# Patient Record
Sex: FEMALE | Race: WHITE | Employment: UNEMPLOYED
[De-identification: names, ages, dates, MRNs, and addresses within clinical notes are randomized per-mention and may not be internally consistent; named-entity substitution may affect disease eponyms.]

---

## 2017-01-11 ENCOUNTER — OFFICE VISIT (OUTPATIENT)
Dept: ORTHOPEDIC SURGERY | Facility: CLINIC | Age: 62
End: 2017-01-11

## 2017-01-11 VITALS
WEIGHT: 161 LBS | SYSTOLIC BLOOD PRESSURE: 153 MMHG | BODY MASS INDEX: 30.4 KG/M2 | DIASTOLIC BLOOD PRESSURE: 89 MMHG | HEART RATE: 72 BPM | HEIGHT: 61 IN

## 2017-01-11 DIAGNOSIS — M19.012 ARTHRITIS OF LEFT SHOULDER REGION: Primary | ICD-10-CM

## 2017-01-11 PROCEDURE — 99213 OFFICE O/P EST LOW 20 MIN: CPT | Performed by: ORTHOPAEDIC SURGERY

## 2017-03-08 ENCOUNTER — OFFICE VISIT (OUTPATIENT)
Dept: ORTHOPEDIC SURGERY | Facility: CLINIC | Age: 62
End: 2017-03-08

## 2017-03-08 DIAGNOSIS — M25.512 CHRONIC LEFT SHOULDER PAIN: ICD-10-CM

## 2017-03-08 DIAGNOSIS — M19.012 ARTHRITIS OF LEFT SHOULDER REGION: Primary | ICD-10-CM

## 2017-03-08 DIAGNOSIS — G89.29 CHRONIC LEFT SHOULDER PAIN: ICD-10-CM

## 2017-03-08 PROCEDURE — G8419 CALC BMI OUT NRM PARAM NOF/U: HCPCS | Performed by: ORTHOPAEDIC SURGERY

## 2017-03-08 PROCEDURE — 3014F SCREEN MAMMO DOC REV: CPT | Performed by: ORTHOPAEDIC SURGERY

## 2017-03-08 PROCEDURE — G8427 DOCREV CUR MEDS BY ELIG CLIN: HCPCS | Performed by: ORTHOPAEDIC SURGERY

## 2017-03-08 PROCEDURE — 3017F COLORECTAL CA SCREEN DOC REV: CPT | Performed by: ORTHOPAEDIC SURGERY

## 2017-03-08 PROCEDURE — 99213 OFFICE O/P EST LOW 20 MIN: CPT | Performed by: ORTHOPAEDIC SURGERY

## 2017-03-08 PROCEDURE — 1036F TOBACCO NON-USER: CPT | Performed by: ORTHOPAEDIC SURGERY

## 2017-03-08 PROCEDURE — G8484 FLU IMMUNIZE NO ADMIN: HCPCS | Performed by: ORTHOPAEDIC SURGERY

## 2017-03-08 RX ORDER — MELOXICAM 15 MG/1
15 TABLET ORAL DAILY
Qty: 30 TABLET | Refills: 6 | Status: SHIPPED | OUTPATIENT
Start: 2017-03-08 | End: 2017-09-29 | Stop reason: SDUPTHER

## 2017-06-13 PROBLEM — M19.91 PRIMARY OSTEOARTHRITIS: Status: ACTIVE | Noted: 2017-06-13

## 2017-06-26 ENCOUNTER — HOSPITAL ENCOUNTER (OUTPATIENT)
Dept: WOMENS IMAGING | Age: 62
Discharge: HOME | End: 2017-06-26
Payer: COMMERCIAL

## 2017-06-26 DIAGNOSIS — Z13.9 SCREENING: ICD-10-CM

## 2017-06-26 PROBLEM — M85.80 OSTEOPENIA: Status: ACTIVE | Noted: 2017-06-26

## 2017-06-26 PROCEDURE — G0202 SCR MAMMO BI INCL CAD: HCPCS

## 2017-06-26 PROCEDURE — 77080 DXA BONE DENSITY AXIAL: CPT

## 2017-07-10 ENCOUNTER — HOSPITAL ENCOUNTER (OUTPATIENT)
Age: 62
Setting detail: SPECIMEN
Discharge: HOME | End: 2017-07-10
Payer: COMMERCIAL

## 2017-07-10 DIAGNOSIS — E55.9 VITAMIN D DEFICIENCY: ICD-10-CM

## 2017-07-10 DIAGNOSIS — D50.9 IRON DEFICIENCY ANEMIA, UNSPECIFIED IRON DEFICIENCY ANEMIA TYPE: ICD-10-CM

## 2017-07-10 DIAGNOSIS — B18.2 CHRONIC HEPATITIS C WITHOUT HEPATIC COMA (HCC): ICD-10-CM

## 2017-07-10 DIAGNOSIS — E78.00 PURE HYPERCHOLESTEROLEMIA: ICD-10-CM

## 2017-07-10 LAB
ALBUMIN SERPL-MCNC: 4 G/DL (ref 3.5–5.2)
ALBUMIN/GLOBULIN RATIO: 1.6 (ref 1–2.5)
ALP BLD-CCNC: 56 U/L (ref 35–104)
ALT SERPL-CCNC: 12 U/L (ref 5–33)
AMYLASE: 29 U/L (ref 28–100)
ANION GAP SERPL CALCULATED.3IONS-SCNC: 10 MMOL/L (ref 9–17)
AST SERPL-CCNC: 14 U/L
BILIRUB SERPL-MCNC: 0.57 MG/DL (ref 0.3–1.2)
BUN BLDV-MCNC: 8 MG/DL (ref 8–23)
BUN/CREAT BLD: NORMAL (ref 9–20)
CALCIUM SERPL-MCNC: 9.3 MG/DL (ref 8.6–10.4)
CHLORIDE BLD-SCNC: 101 MMOL/L (ref 98–107)
CHOLESTEROL/HDL RATIO: 2.2
CHOLESTEROL: 151 MG/DL
CO2: 29 MMOL/L (ref 20–31)
CREAT SERPL-MCNC: 0.64 MG/DL (ref 0.5–0.9)
GFR AFRICAN AMERICAN: >60 ML/MIN
GFR NON-AFRICAN AMERICAN: >60 ML/MIN
GFR SERPL CREATININE-BSD FRML MDRD: NORMAL ML/MIN/{1.73_M2}
GFR SERPL CREATININE-BSD FRML MDRD: NORMAL ML/MIN/{1.73_M2}
GLUCOSE BLD-MCNC: 95 MG/DL (ref 70–99)
HCT VFR BLD CALC: 43.9 % (ref 36–46)
HDLC SERPL-MCNC: 68 MG/DL
HEMOGLOBIN: 14.5 G/DL (ref 12–16)
IRON: 72 UG/DL (ref 37–145)
LDL CHOLESTEROL: 69 MG/DL (ref 0–130)
LIPASE: 21 U/L (ref 13–60)
MCH RBC QN AUTO: 30.9 PG (ref 26–34)
MCHC RBC AUTO-ENTMCNC: 33 G/DL (ref 31–37)
MCV RBC AUTO: 93.4 FL (ref 80–100)
PDW BLD-RTO: 14 % (ref 12.5–15.4)
PLATELET # BLD: 212 K/UL (ref 140–450)
PMV BLD AUTO: 8.6 FL (ref 6–12)
POTASSIUM SERPL-SCNC: 3.9 MMOL/L (ref 3.7–5.3)
RBC # BLD: 4.7 M/UL (ref 4–5.2)
SODIUM BLD-SCNC: 140 MMOL/L (ref 135–144)
TOTAL PROTEIN: 6.5 G/DL (ref 6.4–8.3)
TRIGL SERPL-MCNC: 69 MG/DL
VITAMIN D 25-HYDROXY: 27.6 NG/ML (ref 30–100)
VLDLC SERPL CALC-MCNC: NORMAL MG/DL (ref 1–30)
WBC # BLD: 9.4 K/UL (ref 3.5–11)

## 2017-07-12 ENCOUNTER — OFFICE VISIT (OUTPATIENT)
Dept: ORTHOPEDIC SURGERY | Age: 62
End: 2017-07-12
Payer: COMMERCIAL

## 2017-07-12 VITALS — SYSTOLIC BLOOD PRESSURE: 161 MMHG | HEART RATE: 81 BPM | DIASTOLIC BLOOD PRESSURE: 91 MMHG

## 2017-07-12 DIAGNOSIS — M19.012 ARTHRITIS OF LEFT SHOULDER REGION: Primary | ICD-10-CM

## 2017-07-12 DIAGNOSIS — M65.341 TRIGGER FINGER, RIGHT RING FINGER: ICD-10-CM

## 2017-07-12 PROCEDURE — G8427 DOCREV CUR MEDS BY ELIG CLIN: HCPCS | Performed by: ORTHOPAEDIC SURGERY

## 2017-07-12 PROCEDURE — G8417 CALC BMI ABV UP PARAM F/U: HCPCS | Performed by: ORTHOPAEDIC SURGERY

## 2017-07-12 PROCEDURE — 99212 OFFICE O/P EST SF 10 MIN: CPT | Performed by: ORTHOPAEDIC SURGERY

## 2017-07-12 PROCEDURE — 3014F SCREEN MAMMO DOC REV: CPT | Performed by: ORTHOPAEDIC SURGERY

## 2017-07-12 PROCEDURE — 1036F TOBACCO NON-USER: CPT | Performed by: ORTHOPAEDIC SURGERY

## 2017-07-12 PROCEDURE — 3017F COLORECTAL CA SCREEN DOC REV: CPT | Performed by: ORTHOPAEDIC SURGERY

## 2017-08-14 ENCOUNTER — HOSPITAL ENCOUNTER (OUTPATIENT)
Dept: GENERAL RADIOLOGY | Facility: CLINIC | Age: 62
Discharge: HOME | End: 2017-08-14
Payer: COMMERCIAL

## 2017-08-14 ENCOUNTER — HOSPITAL ENCOUNTER (OUTPATIENT)
Facility: CLINIC | Age: 62
Discharge: HOME | End: 2017-08-14
Payer: COMMERCIAL

## 2017-08-14 DIAGNOSIS — R05.3 CHRONIC COUGH: ICD-10-CM

## 2017-08-14 PROCEDURE — 71020 XR CHEST STANDARD TWO VW: CPT

## 2017-08-14 PROCEDURE — 70220 X-RAY EXAM OF SINUSES: CPT

## 2017-09-29 DIAGNOSIS — G89.29 CHRONIC LEFT SHOULDER PAIN: ICD-10-CM

## 2017-09-29 DIAGNOSIS — M25.512 CHRONIC LEFT SHOULDER PAIN: ICD-10-CM

## 2017-09-29 DIAGNOSIS — M19.012 ARTHRITIS OF LEFT SHOULDER REGION: ICD-10-CM

## 2017-09-29 RX ORDER — MELOXICAM 15 MG/1
15 TABLET ORAL DAILY
Qty: 30 TABLET | Refills: 6 | Status: SHIPPED | OUTPATIENT
Start: 2017-09-29 | End: 2018-05-14 | Stop reason: SDUPTHER

## 2017-10-18 ENCOUNTER — TELEPHONE (OUTPATIENT)
Dept: ORTHOPEDIC SURGERY | Age: 62
End: 2017-10-18

## 2017-10-18 DIAGNOSIS — M25.512 CHRONIC LEFT SHOULDER PAIN: Primary | ICD-10-CM

## 2017-10-18 DIAGNOSIS — G89.29 CHRONIC LEFT SHOULDER PAIN: Primary | ICD-10-CM

## 2017-10-26 ENCOUNTER — HOSPITAL ENCOUNTER (OUTPATIENT)
Dept: INTERVENTIONAL RADIOLOGY/VASCULAR | Age: 62
Discharge: HOME | End: 2017-10-26
Payer: COMMERCIAL

## 2017-10-26 DIAGNOSIS — M25.512 LEFT SHOULDER PAIN, UNSPECIFIED CHRONICITY: ICD-10-CM

## 2017-10-26 PROCEDURE — 2500000003 HC RX 250 WO HCPCS: Performed by: RADIOLOGY

## 2017-10-26 PROCEDURE — 23350 INJECTION FOR SHOULDER X-RAY: CPT

## 2017-10-26 PROCEDURE — 6360000004 HC RX CONTRAST MEDICATION: Performed by: ORTHOPAEDIC SURGERY

## 2017-10-26 PROCEDURE — 77002 NEEDLE LOCALIZATION BY XRAY: CPT

## 2017-10-26 PROCEDURE — 2500000003 HC RX 250 WO HCPCS: Performed by: ORTHOPAEDIC SURGERY

## 2017-10-26 PROCEDURE — 6360000002 HC RX W HCPCS: Performed by: ORTHOPAEDIC SURGERY

## 2017-10-26 RX ORDER — METHYLPREDNISOLONE ACETATE 80 MG/ML
80 INJECTION, SUSPENSION INTRA-ARTICULAR; INTRALESIONAL; INTRAMUSCULAR; SOFT TISSUE ONCE
Status: COMPLETED | OUTPATIENT
Start: 2017-10-26 | End: 2017-10-26

## 2017-10-26 RX ORDER — BUPIVACAINE HYDROCHLORIDE 5 MG/ML
4 INJECTION, SOLUTION EPIDURAL; INTRACAUDAL ONCE
Status: COMPLETED | OUTPATIENT
Start: 2017-10-26 | End: 2017-10-26

## 2017-10-26 RX ORDER — LIDOCAINE HYDROCHLORIDE 10 MG/ML
4 INJECTION, SOLUTION EPIDURAL; INFILTRATION; INTRACAUDAL; PERINEURAL ONCE
Status: COMPLETED | OUTPATIENT
Start: 2017-10-26 | End: 2017-10-26

## 2017-10-26 RX ORDER — LIDOCAINE HYDROCHLORIDE 20 MG/ML
INJECTION, SOLUTION EPIDURAL; INFILTRATION; INTRACAUDAL; PERINEURAL
Status: COMPLETED | OUTPATIENT
Start: 2017-10-26 | End: 2017-10-26

## 2017-10-26 RX ADMIN — IOTHALAMATE MEGLUMINE 50 ML: 600 INJECTION INTRAVASCULAR at 15:08

## 2017-10-26 RX ADMIN — BUPIVACAINE HYDROCHLORIDE 20 MG: 5 INJECTION, SOLUTION EPIDURAL; INTRACAUDAL at 15:00

## 2017-10-26 RX ADMIN — LIDOCAINE HYDROCHLORIDE 4 ML: 10 INJECTION, SOLUTION EPIDURAL; INFILTRATION; INTRACAUDAL; PERINEURAL at 15:00

## 2017-10-26 RX ADMIN — LIDOCAINE HYDROCHLORIDE 2 ML: 20 INJECTION, SOLUTION EPIDURAL; INFILTRATION; INTRACAUDAL; PERINEURAL at 14:45

## 2017-10-26 RX ADMIN — METHYLPREDNISOLONE ACETATE 80 MG: 80 INJECTION, SUSPENSION INTRA-ARTICULAR; INTRALESIONAL; INTRAMUSCULAR; SOFT TISSUE at 15:00

## 2017-12-30 DIAGNOSIS — N39.0 FREQUENT UTI: ICD-10-CM

## 2018-01-02 RX ORDER — NITROFURANTOIN 25; 75 MG/1; MG/1
CAPSULE ORAL
Qty: 30 CAPSULE | Refills: 5 | Status: SHIPPED | OUTPATIENT
Start: 2018-01-02 | End: 2018-04-04

## 2018-04-04 DIAGNOSIS — M25.512 CHRONIC LEFT SHOULDER PAIN: Primary | ICD-10-CM

## 2018-04-04 DIAGNOSIS — G89.29 CHRONIC LEFT SHOULDER PAIN: Primary | ICD-10-CM

## 2018-04-04 PROBLEM — L97.521 CHRONIC ULCER OF LEFT FOOT LIMITED TO BREAKDOWN OF SKIN (HCC): Status: ACTIVE | Noted: 2018-04-04

## 2018-04-09 ENCOUNTER — OFFICE VISIT (OUTPATIENT)
Dept: PODIATRY | Age: 63
End: 2018-04-09
Payer: COMMERCIAL

## 2018-04-09 VITALS
SYSTOLIC BLOOD PRESSURE: 125 MMHG | HEART RATE: 74 BPM | HEIGHT: 61 IN | BODY MASS INDEX: 30.21 KG/M2 | WEIGHT: 160 LBS | DIASTOLIC BLOOD PRESSURE: 77 MMHG

## 2018-04-09 DIAGNOSIS — L97.521 SKIN ULCER OF TOE OF LEFT FOOT, LIMITED TO BREAKDOWN OF SKIN (HCC): Primary | ICD-10-CM

## 2018-04-09 DIAGNOSIS — I77.6 SMALL VESSEL VASCULITIS (HCC): ICD-10-CM

## 2018-04-09 DIAGNOSIS — M79.675 PAIN OF TOE OF LEFT FOOT: ICD-10-CM

## 2018-04-09 DIAGNOSIS — I73.9 SMALL VESSEL DISEASE (HCC): ICD-10-CM

## 2018-04-09 PROCEDURE — 3017F COLORECTAL CA SCREEN DOC REV: CPT | Performed by: PODIATRIST

## 2018-04-09 PROCEDURE — 3014F SCREEN MAMMO DOC REV: CPT | Performed by: PODIATRIST

## 2018-04-09 PROCEDURE — 1036F TOBACCO NON-USER: CPT | Performed by: PODIATRIST

## 2018-04-09 PROCEDURE — 99203 OFFICE O/P NEW LOW 30 MIN: CPT | Performed by: PODIATRIST

## 2018-04-09 PROCEDURE — G8417 CALC BMI ABV UP PARAM F/U: HCPCS | Performed by: PODIATRIST

## 2018-04-09 PROCEDURE — G8427 DOCREV CUR MEDS BY ELIG CLIN: HCPCS | Performed by: PODIATRIST

## 2018-04-09 RX ORDER — LIDOCAINE HYDROCHLORIDE 10 MG/ML
4 INJECTION, SOLUTION EPIDURAL; INFILTRATION; INTRACAUDAL; PERINEURAL ONCE
Status: CANCELLED | OUTPATIENT
Start: 2018-04-09 | End: 2018-04-09

## 2018-04-09 RX ORDER — METHYLPREDNISOLONE ACETATE 80 MG/ML
80 INJECTION, SUSPENSION INTRA-ARTICULAR; INTRALESIONAL; INTRAMUSCULAR; SOFT TISSUE ONCE
Status: CANCELLED | OUTPATIENT
Start: 2018-04-09 | End: 2018-04-09

## 2018-04-09 RX ORDER — BUPIVACAINE HYDROCHLORIDE 5 MG/ML
4 INJECTION, SOLUTION EPIDURAL; INTRACAUDAL ONCE
Status: CANCELLED | OUTPATIENT
Start: 2018-04-09 | End: 2018-04-09

## 2018-04-09 ASSESSMENT — ENCOUNTER SYMPTOMS
NAUSEA: 0
BACK PAIN: 0
SHORTNESS OF BREATH: 0
DIARRHEA: 0
COLOR CHANGE: 0

## 2018-04-10 ENCOUNTER — HOSPITAL ENCOUNTER (OUTPATIENT)
Dept: INTERVENTIONAL RADIOLOGY/VASCULAR | Age: 63
Discharge: HOME | End: 2018-04-12
Payer: COMMERCIAL

## 2018-04-10 VITALS — WEIGHT: 160 LBS | BODY MASS INDEX: 30.21 KG/M2 | HEIGHT: 61 IN

## 2018-04-10 DIAGNOSIS — M25.512 PAIN IN JOINT OF LEFT SHOULDER: ICD-10-CM

## 2018-04-10 PROCEDURE — 20610 DRAIN/INJ JOINT/BURSA W/O US: CPT | Performed by: RADIOLOGY

## 2018-04-10 PROCEDURE — 6360000002 HC RX W HCPCS: Performed by: ORTHOPAEDIC SURGERY

## 2018-04-10 PROCEDURE — 2500000003 HC RX 250 WO HCPCS: Performed by: RADIOLOGY

## 2018-04-10 PROCEDURE — 2500000003 HC RX 250 WO HCPCS: Performed by: ORTHOPAEDIC SURGERY

## 2018-04-10 PROCEDURE — 6360000004 HC RX CONTRAST MEDICATION: Performed by: ORTHOPAEDIC SURGERY

## 2018-04-10 PROCEDURE — 77002 NEEDLE LOCALIZATION BY XRAY: CPT | Performed by: RADIOLOGY

## 2018-04-10 RX ORDER — BUPIVACAINE HYDROCHLORIDE 5 MG/ML
4 INJECTION, SOLUTION EPIDURAL; INTRACAUDAL ONCE
Status: COMPLETED | OUTPATIENT
Start: 2018-04-10 | End: 2018-04-10

## 2018-04-10 RX ORDER — LIDOCAINE HYDROCHLORIDE 20 MG/ML
INJECTION, SOLUTION INFILTRATION; PERINEURAL
Status: COMPLETED | OUTPATIENT
Start: 2018-04-10 | End: 2018-04-10

## 2018-04-10 RX ORDER — METHYLPREDNISOLONE ACETATE 80 MG/ML
80 INJECTION, SUSPENSION INTRA-ARTICULAR; INTRALESIONAL; INTRAMUSCULAR; SOFT TISSUE ONCE
Status: COMPLETED | OUTPATIENT
Start: 2018-04-10 | End: 2018-04-10

## 2018-04-10 RX ORDER — LIDOCAINE HYDROCHLORIDE 10 MG/ML
4 INJECTION, SOLUTION EPIDURAL; INFILTRATION; INTRACAUDAL; PERINEURAL ONCE
Status: COMPLETED | OUTPATIENT
Start: 2018-04-10 | End: 2018-04-10

## 2018-04-10 RX ADMIN — BUPIVACAINE HYDROCHLORIDE 20 MG: 5 INJECTION, SOLUTION EPIDURAL; INTRACAUDAL at 15:36

## 2018-04-10 RX ADMIN — METHYLPREDNISOLONE ACETATE 80 MG: 80 INJECTION, SUSPENSION INTRA-ARTICULAR; INTRALESIONAL; INTRAMUSCULAR; SOFT TISSUE at 15:36

## 2018-04-10 RX ADMIN — LIDOCAINE HYDROCHLORIDE 4 ML: 10 INJECTION, SOLUTION EPIDURAL; INFILTRATION; INTRACAUDAL at 15:36

## 2018-04-10 RX ADMIN — LIDOCAINE HYDROCHLORIDE 2 ML: 20 INJECTION, SOLUTION INFILTRATION; PERINEURAL at 15:33

## 2018-04-10 RX ADMIN — IOPAMIDOL 50 ML: 510 INJECTION, SOLUTION INTRAVASCULAR at 15:40

## 2018-04-10 ASSESSMENT — PAIN - FUNCTIONAL ASSESSMENT: PAIN_FUNCTIONAL_ASSESSMENT: 0-10

## 2018-04-23 ENCOUNTER — OFFICE VISIT (OUTPATIENT)
Dept: PODIATRY | Age: 63
End: 2018-04-23
Payer: COMMERCIAL

## 2018-04-23 VITALS
HEIGHT: 61 IN | SYSTOLIC BLOOD PRESSURE: 131 MMHG | BODY MASS INDEX: 30.21 KG/M2 | DIASTOLIC BLOOD PRESSURE: 81 MMHG | HEART RATE: 82 BPM | WEIGHT: 160 LBS

## 2018-04-23 DIAGNOSIS — M19.012 ARTHRITIS OF LEFT SHOULDER REGION: ICD-10-CM

## 2018-04-23 DIAGNOSIS — M79.675 PAIN OF TOE OF LEFT FOOT: ICD-10-CM

## 2018-04-23 DIAGNOSIS — L97.522 SKIN ULCER OF TOE OF LEFT FOOT WITH FAT LAYER EXPOSED (HCC): Primary | ICD-10-CM

## 2018-04-23 DIAGNOSIS — M25.512 CHRONIC LEFT SHOULDER PAIN: ICD-10-CM

## 2018-04-23 DIAGNOSIS — G89.29 CHRONIC LEFT SHOULDER PAIN: ICD-10-CM

## 2018-04-23 PROCEDURE — 11042 DBRDMT SUBQ TIS 1ST 20SQCM/<: CPT | Performed by: PODIATRIST

## 2018-04-23 RX ORDER — MELOXICAM 15 MG/1
15 TABLET ORAL DAILY
Qty: 30 TABLET | Refills: 6 | Status: ON HOLD | OUTPATIENT
Start: 2018-04-23 | End: 2018-10-11 | Stop reason: HOSPADM

## 2018-04-24 ASSESSMENT — ENCOUNTER SYMPTOMS
BACK PAIN: 0
DIARRHEA: 0
COLOR CHANGE: 0
SHORTNESS OF BREATH: 0
NAUSEA: 0

## 2018-05-14 ENCOUNTER — OFFICE VISIT (OUTPATIENT)
Dept: PODIATRY | Age: 63
End: 2018-05-14
Payer: COMMERCIAL

## 2018-05-14 VITALS
HEIGHT: 61 IN | SYSTOLIC BLOOD PRESSURE: 128 MMHG | DIASTOLIC BLOOD PRESSURE: 78 MMHG | BODY MASS INDEX: 30.21 KG/M2 | HEART RATE: 79 BPM | WEIGHT: 160 LBS

## 2018-05-14 DIAGNOSIS — R60.0 EDEMA OF TOE: ICD-10-CM

## 2018-05-14 DIAGNOSIS — L97.522 SKIN ULCER OF TOE OF LEFT FOOT WITH FAT LAYER EXPOSED (HCC): Primary | ICD-10-CM

## 2018-05-14 DIAGNOSIS — S90.122A CONTUSION OF LESSER TOE OF LEFT FOOT WITHOUT DAMAGE TO NAIL, INITIAL ENCOUNTER: ICD-10-CM

## 2018-05-14 DIAGNOSIS — M79.675 PAIN OF TOE OF LEFT FOOT: ICD-10-CM

## 2018-05-14 PROCEDURE — G8427 DOCREV CUR MEDS BY ELIG CLIN: HCPCS | Performed by: PODIATRIST

## 2018-05-14 PROCEDURE — 3017F COLORECTAL CA SCREEN DOC REV: CPT | Performed by: PODIATRIST

## 2018-05-14 PROCEDURE — 4004F PT TOBACCO SCREEN RCVD TLK: CPT | Performed by: PODIATRIST

## 2018-05-14 PROCEDURE — G8417 CALC BMI ABV UP PARAM F/U: HCPCS | Performed by: PODIATRIST

## 2018-05-14 PROCEDURE — 99213 OFFICE O/P EST LOW 20 MIN: CPT | Performed by: PODIATRIST

## 2018-05-15 ASSESSMENT — ENCOUNTER SYMPTOMS
BACK PAIN: 0
DIARRHEA: 0
NAUSEA: 0
COLOR CHANGE: 0
SHORTNESS OF BREATH: 0

## 2018-05-31 ENCOUNTER — HOSPITAL ENCOUNTER (OUTPATIENT)
Dept: NEUROLOGY | Age: 63
Discharge: HOME | End: 2018-05-31
Payer: COMMERCIAL

## 2018-05-31 DIAGNOSIS — M79.671 PAIN IN BOTH FEET: ICD-10-CM

## 2018-05-31 DIAGNOSIS — R23.0 BLUE TOES: ICD-10-CM

## 2018-05-31 DIAGNOSIS — M79.672 PAIN IN BOTH FEET: ICD-10-CM

## 2018-05-31 PROCEDURE — 95911 NRV CNDJ TEST 9-10 STUDIES: CPT | Performed by: PHYSICAL MEDICINE & REHABILITATION

## 2018-05-31 PROCEDURE — 95886 MUSC TEST DONE W/N TEST COMP: CPT | Performed by: PHYSICAL MEDICINE & REHABILITATION

## 2018-08-28 ENCOUNTER — OFFICE VISIT (OUTPATIENT)
Dept: ORTHOPEDIC SURGERY | Age: 63
End: 2018-08-28
Payer: COMMERCIAL

## 2018-08-28 VITALS — HEIGHT: 61 IN | BODY MASS INDEX: 30.58 KG/M2 | WEIGHT: 162 LBS

## 2018-08-28 DIAGNOSIS — M19.012 OSTEOARTHRITIS OF LEFT GLENOHUMERAL JOINT: Primary | ICD-10-CM

## 2018-08-28 PROCEDURE — 99213 OFFICE O/P EST LOW 20 MIN: CPT | Performed by: ORTHOPAEDIC SURGERY

## 2018-08-28 PROCEDURE — G8427 DOCREV CUR MEDS BY ELIG CLIN: HCPCS | Performed by: ORTHOPAEDIC SURGERY

## 2018-08-28 PROCEDURE — 4004F PT TOBACCO SCREEN RCVD TLK: CPT | Performed by: ORTHOPAEDIC SURGERY

## 2018-08-28 PROCEDURE — 3017F COLORECTAL CA SCREEN DOC REV: CPT | Performed by: ORTHOPAEDIC SURGERY

## 2018-08-28 PROCEDURE — G8417 CALC BMI ABV UP PARAM F/U: HCPCS | Performed by: ORTHOPAEDIC SURGERY

## 2018-08-28 NOTE — PROGRESS NOTES
arthroplasty (Bilateral); joint replacement;  section (7/15/1979); Hysterectomy (7/15/1979); Colonoscopy; and other surgical history (Left, 8 4 14). Current Medications  Current Outpatient Prescriptions   Medication Sig Dispense Refill    DULoxetine (CYMBALTA) 60 MG extended release capsule TAKE ONE CAPSULE BY MOUTH DAILY 30 capsule 10    NITROFURANTOIN PO Take by mouth as needed      meloxicam (MOBIC) 15 MG tablet Take 1 tablet by mouth daily 30 tablet 6    fluticasone (FLONASE) 50 MCG/ACT nasal spray 1 spray by Nasal route daily 1 Bottle 3    Cetirizine HCl (ZYRTEC ALLERGY) 10 MG CAPS Take 1 capsule by mouth daily as needed (nasal congestion, cough) 30 capsule 3    docusate sodium (COLACE, DULCOLAX) 100 MG CAPS Take 100 mg by mouth 2 times daily.  ibuprofen (ADVIL;MOTRIN) 200 MG tablet Take 200 mg by mouth daily as needed for Pain. No current facility-administered medications for this visit. Allergies  Allergies have been reviewed. Carlos Navarrete is allergic to claritin [loratadine] and demerol. Social History  Carlos Navarrete  reports that she has been smoking Cigarettes. She has a 15.00 pack-year smoking history. She has never used smokeless tobacco. She reports that she drinks about 0.5 oz of alcohol per week . She reports that she does not use drugs. Family History  Mavis's family history includes Diabetes in her father; Heart Attack in her brother, maternal grandfather, and paternal grandfather; Heart Disease in her father and mother; Other in her brother; Stroke in her maternal grandmother and paternal grandmother.      Physical Exam:     Ht 5' 1\" (1.549 m)   Wt 162 lb (73.5 kg)   BMI 30.61 kg/m²    General Appearance: alert, well appearing, and in no distress  Mental Status: alert, oriented to person, place, and time  Gait: normal    Shoulder:    Skin: warm and dry, no rash or erythema; no swelling or obvious muscular atrophy  Vasculature: 2+ radial pulses bilaterally  Neuro: surgical intervention. At this time she is contemplating the possibility of surgery. Consequently we discussed in detail what surgery would entail by way of a left total shoulder arthroplasty. Again details of the procedure including risks and benefits surgery expected outcome, and postoperative recovery course were discussed. Risks of surgery as discussed included were not limited to risk of infection, wound healing complications, intraoperative bleeding, hematoma, temporary and/or permanent nerve injury, stiffness, instability, periprosthetic fracture, implant loosening or failure, medical risks including DVT, PE, and stroke, and risk of anesthesia. She demonstrated a good understanding of our discussion but would like to take some time to discuss with her family. She will call to notify assessed how she would like to proceed. All questions were appropriately answered.         NA = Not assessed  RTC = Rotator cuff  RCT = Rotator cuff tear  ER = External rotation  IR = Internal rotation  AC = Acromioclavicular  GH = Glenohumeral  n = No  y = Yes

## 2018-09-26 ENCOUNTER — HOSPITAL ENCOUNTER (OUTPATIENT)
Dept: PREADMISSION TESTING | Age: 63
Discharge: HOME | End: 2018-09-30
Payer: COMMERCIAL

## 2018-09-26 VITALS
HEIGHT: 61 IN | DIASTOLIC BLOOD PRESSURE: 72 MMHG | OXYGEN SATURATION: 100 % | WEIGHT: 165 LBS | TEMPERATURE: 97.9 F | RESPIRATION RATE: 20 BRPM | HEART RATE: 78 BPM | BODY MASS INDEX: 31.15 KG/M2 | SYSTOLIC BLOOD PRESSURE: 126 MMHG

## 2018-09-26 LAB
-: ABNORMAL
ABSOLUTE EOS #: 0.2 K/UL (ref 0–0.4)
ABSOLUTE IMMATURE GRANULOCYTE: ABNORMAL K/UL (ref 0–0.3)
ABSOLUTE LYMPH #: 2.2 K/UL (ref 1–4.8)
ABSOLUTE MONO #: 0.7 K/UL (ref 0.1–1.3)
AMORPHOUS: ABNORMAL
ANION GAP SERPL CALCULATED.3IONS-SCNC: 10 MMOL/L (ref 9–17)
BACTERIA: ABNORMAL
BASOPHILS # BLD: 1 % (ref 0–2)
BASOPHILS ABSOLUTE: 0.1 K/UL (ref 0–0.2)
BILIRUBIN URINE: NEGATIVE
BUN BLDV-MCNC: 15 MG/DL (ref 8–23)
BUN/CREAT BLD: NORMAL (ref 9–20)
CALCIUM SERPL-MCNC: 9.8 MG/DL (ref 8.6–10.4)
CASTS UA: ABNORMAL /LPF
CHLORIDE BLD-SCNC: 100 MMOL/L (ref 98–107)
CO2: 29 MMOL/L (ref 20–31)
COLOR: YELLOW
COMMENT UA: ABNORMAL
CREAT SERPL-MCNC: 0.67 MG/DL (ref 0.5–0.9)
CRYSTALS, UA: ABNORMAL /HPF
DIFFERENTIAL TYPE: ABNORMAL
EOSINOPHILS RELATIVE PERCENT: 2 % (ref 0–4)
EPITHELIAL CELLS UA: ABNORMAL /HPF
GFR AFRICAN AMERICAN: >60 ML/MIN
GFR NON-AFRICAN AMERICAN: >60 ML/MIN
GFR SERPL CREATININE-BSD FRML MDRD: NORMAL ML/MIN/{1.73_M2}
GFR SERPL CREATININE-BSD FRML MDRD: NORMAL ML/MIN/{1.73_M2}
GLUCOSE BLD-MCNC: 87 MG/DL (ref 70–99)
GLUCOSE URINE: NEGATIVE
HCT VFR BLD CALC: 48.1 % (ref 36–46)
HEMOGLOBIN: 16.3 G/DL (ref 12–16)
IMMATURE GRANULOCYTES: ABNORMAL %
KETONES, URINE: NEGATIVE
LEUKOCYTE ESTERASE, URINE: ABNORMAL
LYMPHOCYTES # BLD: 23 % (ref 24–44)
MCH RBC QN AUTO: 32.1 PG (ref 26–34)
MCHC RBC AUTO-ENTMCNC: 33.8 G/DL (ref 31–37)
MCV RBC AUTO: 94.8 FL (ref 80–100)
MONOCYTES # BLD: 7 % (ref 1–7)
MRSA, DNA, NASAL: NORMAL
MUCUS: ABNORMAL
NITRITE, URINE: NEGATIVE
NRBC AUTOMATED: ABNORMAL PER 100 WBC
OTHER OBSERVATIONS UA: ABNORMAL
PDW BLD-RTO: 13.6 % (ref 11.5–14.9)
PH UA: 5.5 (ref 5–8)
PLATELET # BLD: 255 K/UL (ref 150–450)
PLATELET ESTIMATE: ABNORMAL
PMV BLD AUTO: 7.9 FL (ref 6–12)
POTASSIUM SERPL-SCNC: 4.3 MMOL/L (ref 3.7–5.3)
PROTEIN UA: NEGATIVE
RBC # BLD: 5.08 M/UL (ref 4–5.2)
RBC # BLD: ABNORMAL 10*6/UL
RBC UA: ABNORMAL /HPF
RENAL EPITHELIAL, UA: ABNORMAL /HPF
SEG NEUTROPHILS: 67 % (ref 36–66)
SEGMENTED NEUTROPHILS ABSOLUTE COUNT: 6.4 K/UL (ref 1.3–9.1)
SODIUM BLD-SCNC: 139 MMOL/L (ref 135–144)
SPECIFIC GRAVITY UA: 1.01 (ref 1–1.03)
SPECIMEN DESCRIPTION: NORMAL
TRICHOMONAS: ABNORMAL
TURBIDITY: CLEAR
URINE HGB: NEGATIVE
UROBILINOGEN, URINE: NORMAL
WBC # BLD: 9.5 K/UL (ref 3.5–11)
WBC # BLD: ABNORMAL 10*3/UL
WBC UA: ABNORMAL /HPF
YEAST: ABNORMAL

## 2018-09-26 PROCEDURE — 36415 COLL VENOUS BLD VENIPUNCTURE: CPT

## 2018-09-26 PROCEDURE — 80048 BASIC METABOLIC PNL TOTAL CA: CPT

## 2018-09-26 PROCEDURE — 81001 URINALYSIS AUTO W/SCOPE: CPT

## 2018-09-26 PROCEDURE — 87086 URINE CULTURE/COLONY COUNT: CPT

## 2018-09-26 PROCEDURE — 85025 COMPLETE CBC W/AUTO DIFF WBC: CPT

## 2018-09-26 PROCEDURE — 87641 MR-STAPH DNA AMP PROBE: CPT

## 2018-09-26 ASSESSMENT — ENCOUNTER SYMPTOMS
BACK PAIN: 0
BLURRED VISION: 0
SHORTNESS OF BREATH: 0
SINUS PAIN: 0
BLOOD IN STOOL: 0
DOUBLE VISION: 0
NAUSEA: 0
CONSTIPATION: 0
SORE THROAT: 0
COUGH: 0
ABDOMINAL PAIN: 0
DIARRHEA: 0
VOMITING: 0

## 2018-09-26 NOTE — H&P
tract infection)     frequent     Pt denies any history of:  Diabetes Mellitus, Hypertension, Hyperlipidemia, CAD, CVA/TIA, Asthma/COPD, Thyroid disease, Kidney Disease, Cancer, Seizures, Tuberculosis    SURGICAL HISTORY       Past Surgical History:   Procedure Laterality Date    BLADDER SUSPENSION       SECTION  7/15/1979    along with a hyst, removal of 1 ovary    COLONOSCOPY      DILATION AND CURETTAGE OF UTERUS      x 2    HYSTERECTOMY  7/15/1979    uterine rupture due to placenta accreta    JOINT REPLACEMENT      see knee arthroplasty    LIVER BIOPSY      OTHER SURGICAL HISTORY Left 8 4 14    tarsal tunnel release, micro debridement tendon, gps, onestep cast    TONSILLECTOMY      TOTAL KNEE ARTHROPLASTY Bilateral      FAMILY HISTORY       Family History   Problem Relation Age of Onset    Diabetes Father     Heart Disease Father         CHF    Heart Attack Brother     Other Brother         depression    Stroke Maternal Grandmother     Heart Attack Maternal Grandfather     Stroke Paternal Grandmother     Heart Attack Paternal Grandfather     Heart Disease Mother         pacemaker     SOCIAL HISTORY       Social History     Social History    Marital status:      Spouse name: N/A    Number of children: N/A    Years of education: N/A     Social History Main Topics    Smoking status: Current Every Day Smoker     Packs/day: 0.50     Years: 30.00     Types: Cigarettes    Smokeless tobacco: Never Used      Comment: patient currently trying to quit    Alcohol use 0.5 oz/week     1 Standard drinks or equivalent per week      Comment: once a week    Drug use: No    Sexual activity: Not Asked     Other Topics Concern    None     Social History Narrative    None     REVIEW OF SYSTEMS      Allergies   Allergen Reactions    Claritin [Loratadine] Other (See Comments)     headache    Demerol Nausea And Vomiting     Current Outpatient Prescriptions on File Prior to Encounter Does not appear to be in any distress or pain at this time. SKIN:  Normal temperature, turgor and texture. No cyanosis or jaundice. No ecchymosis or signs of bleeding. HEAD:  Normocephalic, atraumatic. EYES:  Pupils equal, reactive to light and accomodation. Conjunctiva clear. EOMs intact bilaterally. NOSE:  No epistaxis. THROAT:  Mucous membranes moist. No tonsillar erythema or exudates. NECK:  No stiffness, trachea central.  No palpable masses. CHEST:  Symmetrical and equal on expansion. HEART:  Normotensive. Regular rate, rhythm. No murmur. LUNGS:  Equal on expansion. Clear to auscultation with no adventitious sound. ABDOMEN:  Obese. Soft on palpation. No localized tenderness, guarding or rigidity. No palpable organomegaly. LYMPHATICS:  No palpable cervical lymphadenopathy. LOCOMOTOR, BACK AND SPINE:  No tenderness or deformities. No flank tenderness. EXTREMITIES:  Tenderness to palpation of left shoulder throughout. Limited range of motion in all directions secondary to pain. No weakness or sensory deficit. Lower extremities symmetrical with no pretibial/pedal edema. No discoloration or ulcerations. No warmth, tenderness, erythema noted in lower legs bilaterally. NEUROLOGIC:  The patient is conscious, alert, oriented. No apparent focal sensory or motor deficits. Cranial nerve exam reveals no deficits.                                                                        PROVISIONAL DIAGNOSES / SURGERY:      Glenohumeral Arthritis, Left   Total Shoulder Arthroplasty, Left    Patient Active Problem List    Diagnosis Date Noted    Chronic ulcer of left foot limited to breakdown of skin ([facility] [address] 04/04/2018    Osteopenia 06/26/2017    Primary osteoarthritis 06/13/2017    Gall bladder disease 10/24/2016    Left shoulder pain 05/19/2016    Tobacco abuse 05/19/2016    Pure hypercholesterolemia 01/18/2016    Obesity (BMI 30.0-34.9) 11/19/2015    Vitamin D deficiency 07/17/2015    Delta storage pool disease ([facility] [address] 05/02/2014    Lumbar disc disease with radiculopathy 05/02/2014    Leukocytosis 05/02/2014    DVT (deep venous thrombosis) (HCC) 05/01/2014    Hepatitis C     Ovarian cyst rupture     Uterine rupture     Iron deficiency anemia     Anxiety     Depression     GERD (gastroesophageal reflux disease)          **Discussed case with nurse who notified that anesthesiology requesting clearance prior to surgery    Galo Noel PA-C on 9/26/2018 at 2:24 PM

## 2018-09-27 LAB
CULTURE: NO GROWTH
Lab: NORMAL
SPECIMEN DESCRIPTION: NORMAL
STATUS: NORMAL

## 2018-10-02 ENCOUNTER — OFFICE VISIT (OUTPATIENT)
Dept: ORTHOPEDIC SURGERY | Age: 63
End: 2018-10-02
Payer: COMMERCIAL

## 2018-10-02 VITALS — HEIGHT: 61 IN | BODY MASS INDEX: 31.15 KG/M2 | WEIGHT: 165 LBS

## 2018-10-02 DIAGNOSIS — M19.012 OSTEOARTHRITIS OF LEFT GLENOHUMERAL JOINT: Primary | ICD-10-CM

## 2018-10-02 PROBLEM — Z96.612 H/O TOTAL SHOULDER REPLACEMENT, LEFT: Status: ACTIVE | Noted: 2018-10-02

## 2018-10-02 PROBLEM — Z96.612 H/O TOTAL SHOULDER REPLACEMENT, LEFT: Status: RESOLVED | Noted: 2018-10-02 | Resolved: 2018-10-02

## 2018-10-02 PROCEDURE — G8417 CALC BMI ABV UP PARAM F/U: HCPCS | Performed by: ORTHOPAEDIC SURGERY

## 2018-10-02 PROCEDURE — G8427 DOCREV CUR MEDS BY ELIG CLIN: HCPCS | Performed by: ORTHOPAEDIC SURGERY

## 2018-10-02 PROCEDURE — 3017F COLORECTAL CA SCREEN DOC REV: CPT | Performed by: ORTHOPAEDIC SURGERY

## 2018-10-02 PROCEDURE — G8484 FLU IMMUNIZE NO ADMIN: HCPCS | Performed by: ORTHOPAEDIC SURGERY

## 2018-10-02 PROCEDURE — 4004F PT TOBACCO SCREEN RCVD TLK: CPT | Performed by: ORTHOPAEDIC SURGERY

## 2018-10-02 PROCEDURE — 99212 OFFICE O/P EST SF 10 MIN: CPT | Performed by: ORTHOPAEDIC SURGERY

## 2018-10-02 RX ORDER — ONDANSETRON 4 MG/1
4 TABLET, FILM COATED ORAL DAILY PRN
Qty: 20 TABLET | Refills: 0 | Status: SHIPPED | OUTPATIENT
Start: 2018-10-02 | End: 2019-06-11

## 2018-10-02 RX ORDER — HYDROCODONE BITARTRATE AND ACETAMINOPHEN 5; 325 MG/1; MG/1
1 TABLET ORAL EVERY 4 HOURS
Qty: 42 TABLET | Refills: 0 | Status: SHIPPED | OUTPATIENT
Start: 2018-10-02 | End: 2018-10-09

## 2018-10-04 ENCOUNTER — OFFICE VISIT (OUTPATIENT)
Dept: ONCOLOGY | Age: 63
End: 2018-10-04
Payer: COMMERCIAL

## 2018-10-04 VITALS
SYSTOLIC BLOOD PRESSURE: 131 MMHG | HEART RATE: 69 BPM | TEMPERATURE: 98.3 F | BODY MASS INDEX: 31.61 KG/M2 | WEIGHT: 167.3 LBS | DIASTOLIC BLOOD PRESSURE: 85 MMHG

## 2018-10-04 DIAGNOSIS — D69.1 DELTA STORAGE POOL DISEASE (HCC): ICD-10-CM

## 2018-10-04 PROCEDURE — G8482 FLU IMMUNIZE ORDER/ADMIN: HCPCS | Performed by: INTERNAL MEDICINE

## 2018-10-04 PROCEDURE — 4004F PT TOBACCO SCREEN RCVD TLK: CPT | Performed by: INTERNAL MEDICINE

## 2018-10-04 PROCEDURE — 99214 OFFICE O/P EST MOD 30 MIN: CPT | Performed by: INTERNAL MEDICINE

## 2018-10-04 PROCEDURE — 3017F COLORECTAL CA SCREEN DOC REV: CPT | Performed by: INTERNAL MEDICINE

## 2018-10-04 PROCEDURE — G8427 DOCREV CUR MEDS BY ELIG CLIN: HCPCS | Performed by: INTERNAL MEDICINE

## 2018-10-04 PROCEDURE — G8417 CALC BMI ABV UP PARAM F/U: HCPCS | Performed by: INTERNAL MEDICINE

## 2018-10-04 PROCEDURE — 99211 OFF/OP EST MAY X REQ PHY/QHP: CPT | Performed by: INTERNAL MEDICINE

## 2018-10-04 NOTE — LETTER
temperature 98.3 °F (36.8 °C), temperature source Oral, weight 167 lb 4.8 oz (75.9 kg), not currently breastfeeding. HEENT: Normocephalic and atraumatic. Pupils are equal, round, reactive to light and accommodation. Extraocular muscles are intact. Neck: Showed no JVD, no carotid bruit . Lungs: Clear to auscultation bilaterally. Heart: Regular without any murmur. Abdomen: Soft, nontender. No hepatosplenomegaly. Extremities: Lower extremities show bilateral trace edema and significant varicose veins. Ulcer in 3rd toe of left foot - wrapped. No clubbing, or cyanosis. Neuro exam: intact cranial nerves bilaterally no motor or sensory deficit, gait is normal. Lymphatic: no adenopathy appreciated in the supraclavicular, axillary, cervical or inguinal area        REVIEW OF LABORATORY DATA:   Lab Results   Component Value Date    WBC 9.5 09/26/2018    HGB 16.3 (H) 09/26/2018    HCT 48.1 (H) 09/26/2018    MCV 94.8 09/26/2018     09/26/2018       IMPRESSION:   1. History of DVT, adequately treated with Coumadin  2. Delta storage granule disease, with mild thrombocytopenia. Last CBC showed normal plt   3. Hepatitis C, undergoing treatment, last RNA is negative. 4. Back pain due to sciatic nerve compression. Status post surgery      PLAN:  1. We discussed upcoming shoulder replacement surgery. 2. I am clearing her for surgeyr. 3. Plan for 1 unit of PLT prior to surgery, Lovenox injections while in house, discontinue as she becomes mobile. No plan for longer term anti-coagulation. 4. We reviewed symptoms of DVT and she will monitor. 5. Return as needed. If you have questions, please do not hesitate to call me. I look forward to following Dionte Durbin along with you.     Sincerely,    Randa Wilde MD  Hematology/ Oncology  Cell [phone]

## 2018-10-05 ENCOUNTER — HOSPITAL ENCOUNTER (OUTPATIENT)
Dept: CT IMAGING | Facility: CLINIC | Age: 63
Discharge: HOME | End: 2018-10-07
Payer: COMMERCIAL

## 2018-10-05 DIAGNOSIS — M19.012 OSTEOARTHRITIS OF LEFT GLENOHUMERAL JOINT: ICD-10-CM

## 2018-10-05 PROCEDURE — 73200 CT UPPER EXTREMITY W/O DYE: CPT

## 2018-10-09 ENCOUNTER — ANESTHESIA EVENT (OUTPATIENT)
Dept: OPERATING ROOM | Age: 63
End: 2018-10-09
Payer: COMMERCIAL

## 2018-10-09 ENCOUNTER — PREP FOR PROCEDURE (OUTPATIENT)
Dept: ORTHOPEDIC SURGERY | Age: 63
End: 2018-10-09

## 2018-10-09 RX ORDER — DEXAMETHASONE SODIUM PHOSPHATE 10 MG/ML
10 INJECTION INTRAMUSCULAR; INTRAVENOUS ONCE
Status: CANCELLED | OUTPATIENT
Start: 2018-10-09 | End: 2018-10-09

## 2018-10-09 RX ORDER — SODIUM CHLORIDE 0.9 % (FLUSH) 0.9 %
10 SYRINGE (ML) INJECTION EVERY 12 HOURS SCHEDULED
Status: CANCELLED | OUTPATIENT
Start: 2018-10-09 | End: 2019-10-09

## 2018-10-09 RX ORDER — SODIUM CHLORIDE 0.9 % (FLUSH) 0.9 %
10 SYRINGE (ML) INJECTION PRN
Status: CANCELLED | OUTPATIENT
Start: 2018-10-09 | End: 2019-10-09

## 2018-10-10 ENCOUNTER — HOSPITAL ENCOUNTER (OUTPATIENT)
Age: 63
Setting detail: OBSERVATION
Discharge: HOME | End: 2018-10-11
Attending: ORTHOPAEDIC SURGERY | Admitting: ORTHOPAEDIC SURGERY
Payer: COMMERCIAL

## 2018-10-10 ENCOUNTER — ANESTHESIA (OUTPATIENT)
Dept: OPERATING ROOM | Age: 63
End: 2018-10-10
Payer: COMMERCIAL

## 2018-10-10 ENCOUNTER — APPOINTMENT (OUTPATIENT)
Dept: GENERAL RADIOLOGY | Age: 63
End: 2018-10-10
Attending: ORTHOPAEDIC SURGERY
Payer: COMMERCIAL

## 2018-10-10 VITALS — TEMPERATURE: 99.5 F | OXYGEN SATURATION: 100 % | SYSTOLIC BLOOD PRESSURE: 158 MMHG | DIASTOLIC BLOOD PRESSURE: 96 MMHG

## 2018-10-10 DIAGNOSIS — Z96.612 S/P SHOULDER REPLACEMENT, LEFT: Primary | ICD-10-CM

## 2018-10-10 DIAGNOSIS — M19.012 OSTEOARTHRITIS OF LEFT GLENOHUMERAL JOINT: ICD-10-CM

## 2018-10-10 LAB
ABO/RH: NORMAL
ANTIBODY SCREEN: NEGATIVE
ARM BAND NUMBER: NORMAL
EXPIRATION DATE: NORMAL

## 2018-10-10 PROCEDURE — G0378 HOSPITAL OBSERVATION PER HR: HCPCS

## 2018-10-10 PROCEDURE — 86901 BLOOD TYPING SEROLOGIC RH(D): CPT

## 2018-10-10 PROCEDURE — 6370000000 HC RX 637 (ALT 250 FOR IP): Performed by: ORTHOPAEDIC SURGERY

## 2018-10-10 PROCEDURE — 36415 COLL VENOUS BLD VENIPUNCTURE: CPT

## 2018-10-10 PROCEDURE — 23395 MUSCLE TRANSFER SHOULDER/ARM: CPT | Performed by: ORTHOPAEDIC SURGERY

## 2018-10-10 PROCEDURE — 86900 BLOOD TYPING SEROLOGIC ABO: CPT

## 2018-10-10 PROCEDURE — 6360000002 HC RX W HCPCS: Performed by: ORTHOPAEDIC SURGERY

## 2018-10-10 PROCEDURE — 3600000014 HC SURGERY LEVEL 4 ADDTL 15MIN: Performed by: ORTHOPAEDIC SURGERY

## 2018-10-10 PROCEDURE — 3700000001 HC ADD 15 MINUTES (ANESTHESIA): Performed by: ORTHOPAEDIC SURGERY

## 2018-10-10 PROCEDURE — 2580000003 HC RX 258: Performed by: ORTHOPAEDIC SURGERY

## 2018-10-10 PROCEDURE — 3600000004 HC SURGERY LEVEL 4 BASE: Performed by: ORTHOPAEDIC SURGERY

## 2018-10-10 PROCEDURE — 2580000003 HC RX 258

## 2018-10-10 PROCEDURE — 1200000000 HC SEMI PRIVATE

## 2018-10-10 PROCEDURE — P9037 PLATE PHERES LEUKOREDU IRRAD: HCPCS

## 2018-10-10 PROCEDURE — 2709999900 HC NON-CHARGEABLE SUPPLY: Performed by: ORTHOPAEDIC SURGERY

## 2018-10-10 PROCEDURE — 2580000003 HC RX 258: Performed by: ANESTHESIOLOGY

## 2018-10-10 PROCEDURE — 6360000002 HC RX W HCPCS

## 2018-10-10 PROCEDURE — 23472 RECONSTRUCT SHOULDER JOINT: CPT | Performed by: ORTHOPAEDIC SURGERY

## 2018-10-10 PROCEDURE — 7100000000 HC PACU RECOVERY - FIRST 15 MIN: Performed by: ORTHOPAEDIC SURGERY

## 2018-10-10 PROCEDURE — 76942 ECHO GUIDE FOR BIOPSY: CPT | Performed by: ANESTHESIOLOGY

## 2018-10-10 PROCEDURE — P9016 RBC LEUKOCYTES REDUCED: HCPCS

## 2018-10-10 PROCEDURE — 86850 RBC ANTIBODY SCREEN: CPT

## 2018-10-10 PROCEDURE — 73020 X-RAY EXAM OF SHOULDER: CPT

## 2018-10-10 PROCEDURE — 36430 TRANSFUSION BLD/BLD COMPNT: CPT

## 2018-10-10 PROCEDURE — 7100000001 HC PACU RECOVERY - ADDTL 15 MIN: Performed by: ORTHOPAEDIC SURGERY

## 2018-10-10 PROCEDURE — C1776 JOINT DEVICE (IMPLANTABLE): HCPCS | Performed by: ORTHOPAEDIC SURGERY

## 2018-10-10 PROCEDURE — 2500000003 HC RX 250 WO HCPCS

## 2018-10-10 PROCEDURE — C1713 ANCHOR/SCREW BN/BN,TIS/BN: HCPCS | Performed by: ORTHOPAEDIC SURGERY

## 2018-10-10 PROCEDURE — 3700000000 HC ANESTHESIA ATTENDED CARE: Performed by: ORTHOPAEDIC SURGERY

## 2018-10-10 DEVICE — IMPLANTABLE DEVICE: Type: IMPLANTABLE DEVICE | Site: SHOULDER | Status: FUNCTIONAL

## 2018-10-10 DEVICE — CEMENT BNE 20ML 41GM FULL DOSE PMMA W/ TOBRA M VISC RADPQ: Type: IMPLANTABLE DEVICE | Site: SHOULDER | Status: FUNCTIONAL

## 2018-10-10 DEVICE — COMPONENT GLEN FIX SZ 42 ALL POLY SHLDR PEGGED ANAT E PLUS: Type: IMPLANTABLE DEVICE | Site: SHOULDER | Status: FUNCTIONAL

## 2018-10-10 RX ORDER — SODIUM CHLORIDE 0.9 % (FLUSH) 0.9 %
10 SYRINGE (ML) INJECTION PRN
Status: DISCONTINUED | OUTPATIENT
Start: 2018-10-10 | End: 2018-10-10 | Stop reason: HOSPADM

## 2018-10-10 RX ORDER — ROCURONIUM BROMIDE 10 MG/ML
INJECTION, SOLUTION INTRAVENOUS PRN
Status: DISCONTINUED | OUTPATIENT
Start: 2018-10-10 | End: 2018-10-10 | Stop reason: SDUPTHER

## 2018-10-10 RX ORDER — KETOROLAC TROMETHAMINE 30 MG/ML
30 INJECTION, SOLUTION INTRAMUSCULAR; INTRAVENOUS EVERY 8 HOURS
Status: DISCONTINUED | OUTPATIENT
Start: 2018-10-10 | End: 2018-10-11 | Stop reason: HOSPADM

## 2018-10-10 RX ORDER — DIPHENHYDRAMINE HYDROCHLORIDE 50 MG/ML
12.5 INJECTION INTRAMUSCULAR; INTRAVENOUS
Status: DISCONTINUED | OUTPATIENT
Start: 2018-10-10 | End: 2018-10-10 | Stop reason: HOSPADM

## 2018-10-10 RX ORDER — ONDANSETRON 2 MG/ML
4 INJECTION INTRAMUSCULAR; INTRAVENOUS EVERY 6 HOURS PRN
Status: DISCONTINUED | OUTPATIENT
Start: 2018-10-10 | End: 2018-10-11 | Stop reason: HOSPADM

## 2018-10-10 RX ORDER — OXYCODONE HYDROCHLORIDE AND ACETAMINOPHEN 5; 325 MG/1; MG/1
2 TABLET ORAL EVERY 4 HOURS PRN
Status: DISCONTINUED | OUTPATIENT
Start: 2018-10-10 | End: 2018-10-10 | Stop reason: HOSPADM

## 2018-10-10 RX ORDER — PROPOFOL 10 MG/ML
INJECTION, EMULSION INTRAVENOUS PRN
Status: DISCONTINUED | OUTPATIENT
Start: 2018-10-10 | End: 2018-10-10 | Stop reason: SDUPTHER

## 2018-10-10 RX ORDER — TRAMADOL HYDROCHLORIDE 50 MG/1
50 TABLET ORAL EVERY 6 HOURS PRN
Status: DISCONTINUED | OUTPATIENT
Start: 2018-10-10 | End: 2018-10-11 | Stop reason: HOSPADM

## 2018-10-10 RX ORDER — SODIUM CHLORIDE 0.9 % (FLUSH) 0.9 %
10 SYRINGE (ML) INJECTION EVERY 12 HOURS SCHEDULED
Status: DISCONTINUED | OUTPATIENT
Start: 2018-10-10 | End: 2018-10-10 | Stop reason: HOSPADM

## 2018-10-10 RX ORDER — EPHEDRINE SULFATE/0.9% NACL/PF 50 MG/5 ML
SYRINGE (ML) INTRAVENOUS PRN
Status: DISCONTINUED | OUTPATIENT
Start: 2018-10-10 | End: 2018-10-10 | Stop reason: SDUPTHER

## 2018-10-10 RX ORDER — ONDANSETRON 2 MG/ML
INJECTION INTRAMUSCULAR; INTRAVENOUS PRN
Status: DISCONTINUED | OUTPATIENT
Start: 2018-10-10 | End: 2018-10-10 | Stop reason: SDUPTHER

## 2018-10-10 RX ORDER — OXYCODONE HYDROCHLORIDE 5 MG/1
5 TABLET ORAL EVERY 4 HOURS PRN
Status: DISCONTINUED | OUTPATIENT
Start: 2018-10-10 | End: 2018-10-11 | Stop reason: HOSPADM

## 2018-10-10 RX ORDER — 0.9 % SODIUM CHLORIDE 0.9 %
250 INTRAVENOUS SOLUTION INTRAVENOUS ONCE
Status: DISCONTINUED | OUTPATIENT
Start: 2018-10-10 | End: 2018-10-11 | Stop reason: HOSPADM

## 2018-10-10 RX ORDER — ACETAMINOPHEN 500 MG
1000 TABLET ORAL EVERY 6 HOURS
Status: DISCONTINUED | OUTPATIENT
Start: 2018-10-10 | End: 2018-10-11 | Stop reason: HOSPADM

## 2018-10-10 RX ORDER — DULOXETIN HYDROCHLORIDE 60 MG/1
60 CAPSULE, DELAYED RELEASE ORAL DAILY
Status: DISCONTINUED | OUTPATIENT
Start: 2018-10-10 | End: 2018-10-11 | Stop reason: HOSPADM

## 2018-10-10 RX ORDER — FENTANYL CITRATE 50 UG/ML
INJECTION, SOLUTION INTRAMUSCULAR; INTRAVENOUS PRN
Status: DISCONTINUED | OUTPATIENT
Start: 2018-10-10 | End: 2018-10-10 | Stop reason: SDUPTHER

## 2018-10-10 RX ORDER — MIDAZOLAM HYDROCHLORIDE 1 MG/ML
INJECTION INTRAMUSCULAR; INTRAVENOUS PRN
Status: DISCONTINUED | OUTPATIENT
Start: 2018-10-10 | End: 2018-10-10 | Stop reason: SDUPTHER

## 2018-10-10 RX ORDER — DEXAMETHASONE SODIUM PHOSPHATE 10 MG/ML
10 INJECTION INTRAMUSCULAR; INTRAVENOUS ONCE
Status: COMPLETED | OUTPATIENT
Start: 2018-10-10 | End: 2018-10-10

## 2018-10-10 RX ORDER — ONDANSETRON 2 MG/ML
4 INJECTION INTRAMUSCULAR; INTRAVENOUS
Status: DISCONTINUED | OUTPATIENT
Start: 2018-10-10 | End: 2018-10-10 | Stop reason: HOSPADM

## 2018-10-10 RX ORDER — MORPHINE SULFATE 2 MG/ML
2 INJECTION, SOLUTION INTRAMUSCULAR; INTRAVENOUS EVERY 5 MIN PRN
Status: DISCONTINUED | OUTPATIENT
Start: 2018-10-10 | End: 2018-10-10 | Stop reason: HOSPADM

## 2018-10-10 RX ORDER — TRANEXAMIC ACID 100 MG/ML
INJECTION, SOLUTION INTRAVENOUS PRN
Status: DISCONTINUED | OUTPATIENT
Start: 2018-10-10 | End: 2018-10-10 | Stop reason: SDUPTHER

## 2018-10-10 RX ORDER — CEFAZOLIN SODIUM 1 G/3ML
INJECTION, POWDER, FOR SOLUTION INTRAMUSCULAR; INTRAVENOUS
Status: DISPENSED
Start: 2018-10-10 | End: 2018-10-10

## 2018-10-10 RX ORDER — SODIUM CHLORIDE, SODIUM LACTATE, POTASSIUM CHLORIDE, CALCIUM CHLORIDE 600; 310; 30; 20 MG/100ML; MG/100ML; MG/100ML; MG/100ML
INJECTION, SOLUTION INTRAVENOUS CONTINUOUS
Status: DISCONTINUED | OUTPATIENT
Start: 2018-10-10 | End: 2018-10-10

## 2018-10-10 RX ORDER — LABETALOL HYDROCHLORIDE 5 MG/ML
5 INJECTION, SOLUTION INTRAVENOUS EVERY 10 MIN PRN
Status: DISCONTINUED | OUTPATIENT
Start: 2018-10-10 | End: 2018-10-10 | Stop reason: HOSPADM

## 2018-10-10 RX ORDER — SODIUM CHLORIDE 9 MG/ML
INJECTION, SOLUTION INTRAVENOUS CONTINUOUS
Status: DISCONTINUED | OUTPATIENT
Start: 2018-10-10 | End: 2018-10-11 | Stop reason: HOSPADM

## 2018-10-10 RX ORDER — DOCUSATE SODIUM 100 MG/1
100 CAPSULE, LIQUID FILLED ORAL 2 TIMES DAILY
Status: DISCONTINUED | OUTPATIENT
Start: 2018-10-10 | End: 2018-10-11 | Stop reason: HOSPADM

## 2018-10-10 RX ADMIN — PHENYLEPHRINE HYDROCHLORIDE 100 MCG: 10 INJECTION INTRAVENOUS at 13:00

## 2018-10-10 RX ADMIN — SODIUM CHLORIDE, POTASSIUM CHLORIDE, SODIUM LACTATE AND CALCIUM CHLORIDE: 600; 310; 30; 20 INJECTION, SOLUTION INTRAVENOUS at 13:07

## 2018-10-10 RX ADMIN — PHENYLEPHRINE HYDROCHLORIDE 100 MCG: 10 INJECTION INTRAVENOUS at 12:56

## 2018-10-10 RX ADMIN — DOCUSATE SODIUM 100 MG: 100 CAPSULE, LIQUID FILLED ORAL at 20:47

## 2018-10-10 RX ADMIN — ROCURONIUM BROMIDE 20 MG: 10 INJECTION INTRAVENOUS at 13:47

## 2018-10-10 RX ADMIN — PHENYLEPHRINE HYDROCHLORIDE 100 MCG: 10 INJECTION INTRAVENOUS at 12:53

## 2018-10-10 RX ADMIN — ONDANSETRON 4 MG: 2 INJECTION INTRAMUSCULAR; INTRAVENOUS at 12:50

## 2018-10-10 RX ADMIN — Medication 10 MG: at 13:18

## 2018-10-10 RX ADMIN — SODIUM CHLORIDE, POTASSIUM CHLORIDE, SODIUM LACTATE AND CALCIUM CHLORIDE: 600; 310; 30; 20 INJECTION, SOLUTION INTRAVENOUS at 06:58

## 2018-10-10 RX ADMIN — TRANEXAMIC ACID 1000 MG: 100 INJECTION, SOLUTION INTRAVENOUS at 12:50

## 2018-10-10 RX ADMIN — Medication 2 G: at 12:41

## 2018-10-10 RX ADMIN — FENTANYL CITRATE 100 MCG: 50 INJECTION, SOLUTION INTRAMUSCULAR; INTRAVENOUS at 12:41

## 2018-10-10 RX ADMIN — PHENYLEPHRINE HYDROCHLORIDE 100 MCG: 10 INJECTION INTRAVENOUS at 13:18

## 2018-10-10 RX ADMIN — PHENYLEPHRINE HYDROCHLORIDE 100 MCG: 10 INJECTION INTRAVENOUS at 13:05

## 2018-10-10 RX ADMIN — SODIUM CHLORIDE, POTASSIUM CHLORIDE, SODIUM LACTATE AND CALCIUM CHLORIDE: 600; 310; 30; 20 INJECTION, SOLUTION INTRAVENOUS at 16:31

## 2018-10-10 RX ADMIN — PHENYLEPHRINE HYDROCHLORIDE 30 MCG/MIN: 10 INJECTION INTRAVENOUS at 13:23

## 2018-10-10 RX ADMIN — ACETAMINOPHEN 1000 MG: 500 TABLET, FILM COATED ORAL at 18:56

## 2018-10-10 RX ADMIN — CEFAZOLIN 1 G: 1 INJECTION, POWDER, FOR SOLUTION INTRAMUSCULAR; INTRAVENOUS at 20:50

## 2018-10-10 RX ADMIN — Medication 10 MG: at 13:06

## 2018-10-10 RX ADMIN — PHENYLEPHRINE HYDROCHLORIDE 100 MCG: 10 INJECTION INTRAVENOUS at 13:12

## 2018-10-10 RX ADMIN — SODIUM CHLORIDE: 9 INJECTION, SOLUTION INTRAVENOUS at 18:55

## 2018-10-10 RX ADMIN — KETOROLAC TROMETHAMINE 30 MG: 30 INJECTION, SOLUTION INTRAMUSCULAR at 18:56

## 2018-10-10 RX ADMIN — Medication 10 MG: at 13:09

## 2018-10-10 RX ADMIN — PROPOFOL 150 MG: 10 INJECTION, EMULSION INTRAVENOUS at 12:41

## 2018-10-10 RX ADMIN — MIDAZOLAM 2 MG: 1 INJECTION INTRAMUSCULAR; INTRAVENOUS at 12:34

## 2018-10-10 RX ADMIN — ROCURONIUM BROMIDE 10 MG: 10 INJECTION INTRAVENOUS at 14:33

## 2018-10-10 RX ADMIN — ROCURONIUM BROMIDE 40 MG: 10 INJECTION INTRAVENOUS at 12:41

## 2018-10-10 RX ADMIN — DEXAMETHASONE SODIUM PHOSPHATE 10 MG: 10 INJECTION INTRAMUSCULAR; INTRAVENOUS at 12:47

## 2018-10-10 ASSESSMENT — PULMONARY FUNCTION TESTS
PIF_VALUE: 16
PIF_VALUE: 22
PIF_VALUE: 21
PIF_VALUE: 21
PIF_VALUE: 8
PIF_VALUE: 16
PIF_VALUE: 21
PIF_VALUE: 16
PIF_VALUE: 21
PIF_VALUE: 22
PIF_VALUE: 23
PIF_VALUE: 21
PIF_VALUE: 20
PIF_VALUE: 21
PIF_VALUE: 21
PIF_VALUE: 18
PIF_VALUE: 21
PIF_VALUE: 21
PIF_VALUE: 20
PIF_VALUE: 2
PIF_VALUE: 2
PIF_VALUE: 21
PIF_VALUE: 22
PIF_VALUE: 21
PIF_VALUE: 21
PIF_VALUE: 5
PIF_VALUE: 20
PIF_VALUE: 0
PIF_VALUE: 21
PIF_VALUE: 7
PIF_VALUE: 21
PIF_VALUE: 22
PIF_VALUE: 21
PIF_VALUE: 6
PIF_VALUE: 20
PIF_VALUE: 2
PIF_VALUE: 2
PIF_VALUE: 21
PIF_VALUE: 7
PIF_VALUE: 2
PIF_VALUE: 16
PIF_VALUE: 26
PIF_VALUE: 21
PIF_VALUE: 21
PIF_VALUE: 22
PIF_VALUE: 21
PIF_VALUE: 21
PIF_VALUE: 20
PIF_VALUE: 21
PIF_VALUE: 21
PIF_VALUE: 2
PIF_VALUE: 16
PIF_VALUE: 21
PIF_VALUE: 2
PIF_VALUE: 20
PIF_VALUE: 2
PIF_VALUE: 17
PIF_VALUE: 21
PIF_VALUE: 2
PIF_VALUE: 22
PIF_VALUE: 21
PIF_VALUE: 21
PIF_VALUE: 20
PIF_VALUE: 20
PIF_VALUE: 21
PIF_VALUE: 21
PIF_VALUE: 20
PIF_VALUE: 21
PIF_VALUE: 20
PIF_VALUE: 21
PIF_VALUE: 21
PIF_VALUE: 15
PIF_VALUE: 21
PIF_VALUE: 20
PIF_VALUE: 20
PIF_VALUE: 21
PIF_VALUE: 2
PIF_VALUE: 14
PIF_VALUE: 21
PIF_VALUE: 21
PIF_VALUE: 22
PIF_VALUE: 21
PIF_VALUE: 2
PIF_VALUE: 21
PIF_VALUE: 21
PIF_VALUE: 20
PIF_VALUE: 3
PIF_VALUE: 18
PIF_VALUE: 21
PIF_VALUE: 21
PIF_VALUE: 2
PIF_VALUE: 21
PIF_VALUE: 21
PIF_VALUE: 1
PIF_VALUE: 1
PIF_VALUE: 2
PIF_VALUE: 4
PIF_VALUE: 16
PIF_VALUE: 20
PIF_VALUE: 21
PIF_VALUE: 18
PIF_VALUE: 20
PIF_VALUE: 2
PIF_VALUE: 18
PIF_VALUE: 21
PIF_VALUE: 21
PIF_VALUE: 16
PIF_VALUE: 7
PIF_VALUE: 20
PIF_VALUE: 21
PIF_VALUE: 4
PIF_VALUE: 21
PIF_VALUE: 20
PIF_VALUE: 20
PIF_VALUE: 21
PIF_VALUE: 16
PIF_VALUE: 17
PIF_VALUE: 21
PIF_VALUE: 19
PIF_VALUE: 21
PIF_VALUE: 3
PIF_VALUE: 21
PIF_VALUE: 16
PIF_VALUE: 21
PIF_VALUE: 16
PIF_VALUE: 21
PIF_VALUE: 21
PIF_VALUE: 25
PIF_VALUE: 21
PIF_VALUE: 20
PIF_VALUE: 20
PIF_VALUE: 6
PIF_VALUE: 16
PIF_VALUE: 21
PIF_VALUE: 17
PIF_VALUE: 21
PIF_VALUE: 20
PIF_VALUE: 21
PIF_VALUE: 22
PIF_VALUE: 20
PIF_VALUE: 20
PIF_VALUE: 18
PIF_VALUE: 22
PIF_VALUE: 21
PIF_VALUE: 7
PIF_VALUE: 20
PIF_VALUE: 21
PIF_VALUE: 0
PIF_VALUE: 17
PIF_VALUE: 21
PIF_VALUE: 20
PIF_VALUE: 21
PIF_VALUE: 21
PIF_VALUE: 20
PIF_VALUE: 21
PIF_VALUE: 16
PIF_VALUE: 20
PIF_VALUE: 21
PIF_VALUE: 17
PIF_VALUE: 21
PIF_VALUE: 20
PIF_VALUE: 22
PIF_VALUE: 21
PIF_VALUE: 21
PIF_VALUE: 16

## 2018-10-10 ASSESSMENT — PAIN - FUNCTIONAL ASSESSMENT: PAIN_FUNCTIONAL_ASSESSMENT: 0-10

## 2018-10-10 ASSESSMENT — PAIN SCALES - GENERAL
PAINLEVEL_OUTOF10: 0

## 2018-10-10 ASSESSMENT — PAIN DESCRIPTION - DESCRIPTORS: DESCRIPTORS: ACHING

## 2018-10-10 ASSESSMENT — ENCOUNTER SYMPTOMS
SHORTNESS OF BREATH: 0
STRIDOR: 0

## 2018-10-10 ASSESSMENT — LIFESTYLE VARIABLES: SMOKING_STATUS: 0

## 2018-10-10 NOTE — ANESTHESIA PRE PROCEDURE
 sodium chloride flush 0.9 % injection 10 mL  10 mL Intravenous PRN Lora Gibson MD        dexamethasone (DECADRON) injection 10 mg  10 mg Intravenous Once Enesi O MD Hung        0.9 % sodium chloride bolus  250 mL Intravenous Once Lora Gibson MD           Allergies: Allergies   Allergen Reactions    Claritin [Loratadine] Other (See Comments)     headache    Demerol Nausea And Vomiting       Problem List:    Patient Active Problem List   Diagnosis Code    Hepatitis C B19.20    Ovarian cyst rupture N83.209    Uterine rupture S37.69XA    Iron deficiency anemia D50.9    Anxiety F41.9    Depression F32.9    GERD (gastroesophageal reflux disease) K21.9    DVT (deep venous thrombosis) ([facility]) I82.409    Delta storage pool disease [facility]) D69.1    Lumbar disc disease with radiculopathy M51.16    Leukocytosis D72.829    Vitamin D deficiency E55.9    Obesity (BMI 30.0-34. 9) E66.9    Pure hypercholesterolemia E78.00    Left shoulder pain M25.512    Tobacco abuse Z72.0    Gall bladder disease K82.9    Primary osteoarthritis M19.91    Osteopenia M85.80    Chronic ulcer of left foot limited to breakdown of skin ([facility] [address] L97.521       Past Medical History:        Diagnosis Date    Anxiety     Delta storage pool disease ([facility] [address]     Depression     DJD (degenerative joint disease)     DVT (deep venous thrombosis) ([facility] [address] 2014    Left leg    GERD (gastroesophageal reflux disease)     Hemorrhage     after surgery 3 times due to Delta storage pool platelet disorder    Hepatitis C     H/O after blood transfusion    History of blood transfusion     had transfusions 3 times in the past    Hx of blood clots     Osteoarthritis     knees    Ovarian cyst rupture     H/O, was hemorrhaging, had surgery    Placenta accreta     was bleeding internally, had  and hyst at the same time    PONV (postoperative nausea and vomiting)     Prolapsed bladder     had surgery to repair    Uterine neuromuscular disease:, psychiatric history: stable with treatmentdepression/anxiety    (-) seizures, TIA, CVA and headaches           GI/Hepatic/Renal:   (+) GERD: no interval change, hepatitis: C, liver disease:,      (-) hiatal hernia, PUD, no renal disease, bowel prep and no morbid obesity       Endo/Other: Negative Endo/Other ROS   (+) no malignancy/cancer. (-) diabetes mellitus, hypothyroidism, hyperthyroidism, blood dyscrasia, arthritis, no electrolyte abnormalities, no malignancy/cancer               Abdominal:           Vascular: negative vascular ROS. - PVD, DVT and PE. Anesthesia Plan      general     ASA 3       Induction: intravenous. MIPS: Postoperative opioids intended and Prophylactic antiemetics administered. Anesthetic plan and risks discussed with patient. Plan discussed with CRNA.                   Salnoi Felton MD   10/10/2018

## 2018-10-10 NOTE — H&P (VIEW-ONLY)
HISTORY and [facility] [id number]       NAME:  Franky Vail  MRN: [id number]   YOB: 1955   Date: 2018   Age: 58 y.o. Gender: female     COMPLAINT AND PRESENT HISTORY:     Franky Vail is a 58 y.o.  female, undergoing preadmission testing for left total shoulder arthroplasty. Patient complaining pain in the shoulder for the past few years, progressively worsening. Pain is described as stabbing (8-10/10), right shoulder with some radiation down the arm. Pain is worse with all activity, prolonged inactivity. Pain is improved with injections and anti inflammatories in the past. Patient admits to associated limited range of motion. Patient is otherwise feeling well, no recent fevers/chills, chest pain, shortness of breath. Patient has history of delta storage pool disease, history of prolonged bleeding with surgical procedures, improved with pre administration of platelets. Patient also has history of deep venous thrombosis, no anticoagulation, no recent pain or swelling in lower extremities.        PAST MEDICAL HISTORY     Past Medical History:   Diagnosis Date    Anxiety     Delta storage pool disease [facility])     Depression     DJD (degenerative joint disease)     DVT (deep venous thrombosis) ([facility] [address] 2014    Left leg    GERD (gastroesophageal reflux disease)     Hemorrhage     after surgery 3 times due to Delta storage pool platelet disorder    Hepatitis C     H/O after blood transfusion    History of blood transfusion     had transfusions 3 times in the past    Hx of blood clots     Osteoarthritis     knees    Ovarian cyst rupture     H/O, was hemorrhaging, had surgery    Placenta accreta     was bleeding internally, had  and hyst at the same time    PONV (postoperative nausea and vomiting)     Prolapsed bladder     had surgery to repair    Uterine rupture     due to placenta accreta, had hyst with     UTI (lower urinary Does not appear to be in any distress or pain at this time. SKIN:  Normal temperature, turgor and texture. No cyanosis or jaundice. No ecchymosis or signs of bleeding. HEAD:  Normocephalic, atraumatic. EYES:  Pupils equal, reactive to light and accomodation. Conjunctiva clear. EOMs intact bilaterally. NOSE:  No epistaxis. THROAT:  Mucous membranes moist. No tonsillar erythema or exudates. NECK:  No stiffness, trachea central.  No palpable masses. CHEST:  Symmetrical and equal on expansion. HEART:  Normotensive. Regular rate, rhythm. No murmur. LUNGS:  Equal on expansion. Clear to auscultation with no adventitious sound. ABDOMEN:  Obese. Soft on palpation. No localized tenderness, guarding or rigidity. No palpable organomegaly. LYMPHATICS:  No palpable cervical lymphadenopathy. LOCOMOTOR, BACK AND SPINE:  No tenderness or deformities. No flank tenderness. EXTREMITIES:  Tenderness to palpation of left shoulder throughout. Limited range of motion in all directions secondary to pain. No weakness or sensory deficit. Lower extremities symmetrical with no pretibial/pedal edema. No discoloration or ulcerations. No warmth, tenderness, erythema noted in lower legs bilaterally. NEUROLOGIC:  The patient is conscious, alert, oriented. No apparent focal sensory or motor deficits. Cranial nerve exam reveals no deficits.                                                                        PROVISIONAL DIAGNOSES / SURGERY:      Glenohumeral Arthritis, Left   Total Shoulder Arthroplasty, Left    Patient Active Problem List    Diagnosis Date Noted    Chronic ulcer of left foot limited to breakdown of skin ([facility] [address] 04/04/2018    Osteopenia 06/26/2017    Primary osteoarthritis 06/13/2017    Gall bladder disease 10/24/2016    Left shoulder pain

## 2018-10-10 NOTE — OP NOTE
Operative Report      Date of procedure: 10/10/2018     Preoperative  Diagnosis:   1. Left Severe Glenohumeral Osteoarthritis (M19.012)  2. Left biceps tenosynovitis with tearing (B36.539)    Postoperative Diagnosis:   1. Left Severe Glenohumeral Osteoarthritis (M19.011)  2. Left biceps tenosynovitis with tearing (M67.911)    Procedure:     1. Left Total Shoulder Arthroplasty (17609)   2. Left biceps tendon transfer  (39738)    Surgeon(s): Siri Hsu MD    Assistant(s):  Carnella Rinne, DO; (PGY 4)     Anesthesia: General with left interscalene nerve block    Fluids: See anesthesia record    Urine output: See anesthesia record    EBL: 200 cc    Operative findings: Severe left glenohumeral osteoarthritis with intact rotator cuff and biceps tensyonovitis    Implants:   DJO AltivateTotal Shoulder   - Size 12 Humeral Stem (uncemented)   - 42 x 16 offset Humeral Head   - 32 mm cemented pegged Glenoid    Indications: Mary Arnold is a 58 y.o. old female who presented with functionally disabling right shoulder pain despite attempts at non-operative treatment. CT scan revealed severe glenohumeral arthritis with mild posterior wear. Having failed attempts at conservative management and following a discussion with regards to her treatment options including continued non-surgical treatment and operative intervention, she elected to forgo continued attempts at conservative treatment and proceed with surgery by way of a left total shoulder replacement. Risks, benefits, and alternatives were fully discussed. Postoperative limitations and limitations of the procedure were discussed in detail. The patient understood risks, alternatives, complications, & post-operative limitations and wishes to proceed. Operative Report:    The patient was identified in the preop holding area. Consent was reviewed with patient. Risks, benefits, and alternatives were discussed. All questions were answered.  After receiving a pack of platelets due to her Delta C Storage pool disorder and a left interscalene nerve block was administered by the anesthesia service, the patient was taken back to the OR and transferred onto the operative table. General anesthesia was administered and her airway was secured using an endotracheal tube. The patient was carefully positioned and secured in the beach-chair position, padding all prominences. SCDs/TEDs were placed on both lower extremities. The left upper extremity was prepped and draped in the usual sterile fashion. The patient completed a course of pre-operative antibiotics by way of 2 G IV ancef. A time out was performed during which verification of the surgical site, patient identification, and surgical procedure planned were confirmed by the circulating nurse and anesthetic team.    A 12 cm deltopectoral incision was made. The cephalic vein was identified and protected medially. The subdeltoid, subcoracoid, and subacromial spaces were bluntly developed. The rotator cuff was noted to be intact without tears. The axillary nerve was identified and protected. The biceps was identified and noted to have tenosynovitis within the biceps groove. Given the severity of this pathology, the decision was made to transfer the long head of the biceps tendon to the pectoralis major tendon and resect the intra-articular section. Thus, the biceps tendon was transferred to the pectoralis major tendon using two number 2 ethibond sutures, resulting in a stable tendon transfer. The subscapularis was then released off of the lesser tuberosity. The shoulder was then atraumatically dislocated. Hypertrophic osteophytes were removed from the proximal humerus. An osteotomy was made in 30 degrees of retroversion and the humeral head sized to 32     The axillary nerve was again identified, normal tug test performed, and nerve protected.  A 360 degree sae-glenoid release was then performed and the base of the

## 2018-10-11 VITALS
HEART RATE: 51 BPM | OXYGEN SATURATION: 98 % | DIASTOLIC BLOOD PRESSURE: 65 MMHG | SYSTOLIC BLOOD PRESSURE: 115 MMHG | WEIGHT: 165 LBS | HEIGHT: 61 IN | RESPIRATION RATE: 16 BRPM | TEMPERATURE: 97.8 F | BODY MASS INDEX: 31.15 KG/M2

## 2018-10-11 LAB
ANION GAP SERPL CALCULATED.3IONS-SCNC: 10 MMOL/L (ref 9–17)
ANION GAP SERPL CALCULATED.3IONS-SCNC: NORMAL MMOL/L
BUN BLDV-MCNC: 13 MG/DL (ref 8–23)
BUN BLDV-MCNC: NORMAL MG/DL (ref 8–23)
BUN/CREAT BLD: ABNORMAL (ref 9–20)
BUN/CREAT BLD: NORMAL (ref 9–20)
CALCIUM SERPL-MCNC: 9 MG/DL (ref 8.6–10.4)
CALCIUM SERPL-MCNC: NORMAL MG/DL (ref 8.6–10.4)
CHLORIDE BLD-SCNC: 101 MMOL/L (ref 98–107)
CHLORIDE BLD-SCNC: NORMAL MMOL/L (ref 98–107)
CO2: 24 MMOL/L (ref 20–31)
CO2: NORMAL MMOL/L (ref 20–31)
CREAT SERPL-MCNC: 0.65 MG/DL (ref 0.5–0.9)
CREAT SERPL-MCNC: NORMAL MG/DL (ref 0.5–0.9)
GFR AFRICAN AMERICAN: >60 ML/MIN
GFR AFRICAN AMERICAN: NORMAL ML/MIN
GFR NON-AFRICAN AMERICAN: >60 ML/MIN
GFR NON-AFRICAN AMERICAN: NORMAL ML/MIN
GFR SERPL CREATININE-BSD FRML MDRD: ABNORMAL ML/MIN/{1.73_M2}
GFR SERPL CREATININE-BSD FRML MDRD: ABNORMAL ML/MIN/{1.73_M2}
GFR SERPL CREATININE-BSD FRML MDRD: NORMAL ML/MIN/{1.73_M2}
GFR SERPL CREATININE-BSD FRML MDRD: NORMAL ML/MIN/{1.73_M2}
GLUCOSE BLD-MCNC: 115 MG/DL (ref 70–99)
GLUCOSE BLD-MCNC: 123 MG/DL (ref 65–105)
GLUCOSE BLD-MCNC: NORMAL MG/DL (ref 70–99)
HCT VFR BLD CALC: 35.3 % (ref 36–46)
HEMOGLOBIN: 11.8 G/DL (ref 12–16)
MCH RBC QN AUTO: 31.8 PG (ref 26–34)
MCHC RBC AUTO-ENTMCNC: 33.4 G/DL (ref 31–37)
MCV RBC AUTO: 95.2 FL (ref 80–100)
NRBC AUTOMATED: ABNORMAL PER 100 WBC
PDW BLD-RTO: 13.4 % (ref 11.5–14.9)
PLATELET # BLD: 195 K/UL (ref 150–450)
PMV BLD AUTO: 7.7 FL (ref 6–12)
POTASSIUM SERPL-SCNC: 4 MMOL/L (ref 3.7–5.3)
POTASSIUM SERPL-SCNC: NORMAL MMOL/L (ref 3.7–5.3)
RBC # BLD: 3.71 M/UL (ref 4–5.2)
SODIUM BLD-SCNC: 135 MMOL/L (ref 135–144)
SODIUM BLD-SCNC: NORMAL MMOL/L (ref 135–144)
WBC # BLD: 11.1 K/UL (ref 3.5–11)

## 2018-10-11 PROCEDURE — G8987 SELF CARE CURRENT STATUS: HCPCS

## 2018-10-11 PROCEDURE — G8980 MOBILITY D/C STATUS: HCPCS

## 2018-10-11 PROCEDURE — 80048 BASIC METABOLIC PNL TOTAL CA: CPT

## 2018-10-11 PROCEDURE — G8979 MOBILITY GOAL STATUS: HCPCS

## 2018-10-11 PROCEDURE — 36415 COLL VENOUS BLD VENIPUNCTURE: CPT

## 2018-10-11 PROCEDURE — 97110 THERAPEUTIC EXERCISES: CPT

## 2018-10-11 PROCEDURE — 97535 SELF CARE MNGMENT TRAINING: CPT

## 2018-10-11 PROCEDURE — G0008 ADMIN INFLUENZA VIRUS VAC: HCPCS | Performed by: ORTHOPAEDIC SURGERY

## 2018-10-11 PROCEDURE — 97165 OT EVAL LOW COMPLEX 30 MIN: CPT

## 2018-10-11 PROCEDURE — 6360000002 HC RX W HCPCS: Performed by: ORTHOPAEDIC SURGERY

## 2018-10-11 PROCEDURE — 99024 POSTOP FOLLOW-UP VISIT: CPT | Performed by: ORTHOPAEDIC SURGERY

## 2018-10-11 PROCEDURE — G8988 SELF CARE GOAL STATUS: HCPCS

## 2018-10-11 PROCEDURE — 90686 IIV4 VACC NO PRSV 0.5 ML IM: CPT | Performed by: ORTHOPAEDIC SURGERY

## 2018-10-11 PROCEDURE — 97161 PT EVAL LOW COMPLEX 20 MIN: CPT

## 2018-10-11 PROCEDURE — 82947 ASSAY GLUCOSE BLOOD QUANT: CPT

## 2018-10-11 PROCEDURE — 6370000000 HC RX 637 (ALT 250 FOR IP): Performed by: ORTHOPAEDIC SURGERY

## 2018-10-11 PROCEDURE — 2580000003 HC RX 258: Performed by: ORTHOPAEDIC SURGERY

## 2018-10-11 PROCEDURE — 97116 GAIT TRAINING THERAPY: CPT

## 2018-10-11 PROCEDURE — G0378 HOSPITAL OBSERVATION PER HR: HCPCS

## 2018-10-11 PROCEDURE — 85027 COMPLETE CBC AUTOMATED: CPT

## 2018-10-11 PROCEDURE — G8978 MOBILITY CURRENT STATUS: HCPCS

## 2018-10-11 RX ADMIN — TRAMADOL HYDROCHLORIDE 50 MG: 50 TABLET, FILM COATED ORAL at 10:25

## 2018-10-11 RX ADMIN — CEFAZOLIN 1 G: 1 INJECTION, POWDER, FOR SOLUTION INTRAMUSCULAR; INTRAVENOUS at 05:54

## 2018-10-11 RX ADMIN — KETOROLAC TROMETHAMINE 30 MG: 30 INJECTION, SOLUTION INTRAMUSCULAR at 02:54

## 2018-10-11 RX ADMIN — ENOXAPARIN SODIUM 40 MG: 40 INJECTION SUBCUTANEOUS at 09:55

## 2018-10-11 RX ADMIN — DULOXETINE HYDROCHLORIDE 60 MG: 60 CAPSULE, DELAYED RELEASE ORAL at 09:55

## 2018-10-11 RX ADMIN — INFLUENZA A VIRUS A/MICHIGAN/45/2015 X-275 (H1N1) ANTIGEN (FORMALDEHYDE INACTIVATED), INFLUENZA A VIRUS A/SINGAPORE/INFIMH-16-0019/2016 IVR-186 (H3N2) ANTIGEN (FORMALDEHYDE INACTIVATED), INFLUENZA B VIRUS B/PHUKET/3073/2013 ANTIGEN (FORMALDEHYDE INACTIVATED), AND INFLUENZA B VIRUS B/MARYLAND/15/2016 BX-69A ANTIGEN (FORMALDEHYDE INACTIVATED) 0.5 ML: 15; 15; 15; 15 INJECTION, SUSPENSION INTRAMUSCULAR at 09:59

## 2018-10-11 RX ADMIN — ACETAMINOPHEN 1000 MG: 500 TABLET, FILM COATED ORAL at 00:09

## 2018-10-11 RX ADMIN — OXYCODONE HYDROCHLORIDE 5 MG: 5 TABLET ORAL at 09:58

## 2018-10-11 RX ADMIN — DOCUSATE SODIUM 100 MG: 100 CAPSULE, LIQUID FILLED ORAL at 09:55

## 2018-10-11 RX ADMIN — ACETAMINOPHEN 1000 MG: 500 TABLET, FILM COATED ORAL at 05:54

## 2018-10-11 ASSESSMENT — PAIN SCALES - GENERAL
PAINLEVEL_OUTOF10: 1
PAINLEVEL_OUTOF10: 0
PAINLEVEL_OUTOF10: 1
PAINLEVEL_OUTOF10: 0
PAINLEVEL_OUTOF10: 5
PAINLEVEL_OUTOF10: 0
PAINLEVEL_OUTOF10: 4
PAINLEVEL_OUTOF10: 5
PAINLEVEL_OUTOF10: 4
PAINLEVEL_OUTOF10: 1
PAINLEVEL_OUTOF10: 0

## 2018-10-11 ASSESSMENT — PAIN DESCRIPTION - PROGRESSION
CLINICAL_PROGRESSION: GRADUALLY WORSENING
CLINICAL_PROGRESSION: GRADUALLY WORSENING

## 2018-10-11 ASSESSMENT — PAIN DESCRIPTION - PAIN TYPE
TYPE: SURGICAL PAIN
TYPE: SURGICAL PAIN

## 2018-10-11 ASSESSMENT — PAIN DESCRIPTION - ONSET: ONSET: ON-GOING

## 2018-10-11 ASSESSMENT — PAIN DESCRIPTION - FREQUENCY
FREQUENCY: CONTINUOUS
FREQUENCY: CONTINUOUS

## 2018-10-11 ASSESSMENT — PAIN DESCRIPTION - LOCATION
LOCATION: SHOULDER
LOCATION: SHOULDER

## 2018-10-11 ASSESSMENT — PAIN DESCRIPTION - ORIENTATION
ORIENTATION: LEFT
ORIENTATION: LEFT

## 2018-10-11 ASSESSMENT — PAIN DESCRIPTION - DESCRIPTORS
DESCRIPTORS: TENDER
DESCRIPTORS: TENDER

## 2018-10-11 NOTE — FLOWSHEET NOTE
[facility] and her , Ed; patient hopeful and ready to be discharged; patient shared details of her medical circumstances; listening presence and support provided;     10/11/18 1000   Encounter Summary   Services provided to: Patient   Referral/Consult From: [facility] (10/11/18)   Complexity of Encounter Moderate   Length of Encounter 15 minutes   Spiritual Assessment Completed Yes   Routine   Type Initial   Assessment Approachable; Anxious; Hopeful;Coping   Intervention Active listening;[place];Sustaining presence/ Ministry of presence; Discussed illness/injury and it's impact   Outcome Comfort;Expressed gratitude;Engaged in conversation;Expressed feelings/needs/concerns;Coping; Hopeful;Receptive

## 2018-10-15 LAB
BLD PROD TYP BPU: NORMAL
DISPENSE STATUS BLOOD BANK: NORMAL
TRANSFUSION STATUS: NORMAL
UNIT DIVISION: 0
UNIT NUMBER: NORMAL

## 2018-10-23 ENCOUNTER — OFFICE VISIT (OUTPATIENT)
Dept: ORTHOPEDIC SURGERY | Age: 63
End: 2018-10-23

## 2018-10-23 VITALS — BODY MASS INDEX: 31.15 KG/M2 | WEIGHT: 165 LBS | HEIGHT: 61 IN

## 2018-10-23 DIAGNOSIS — Z96.612 STATUS POST REPLACEMENT OF LEFT SHOULDER JOINT: Primary | ICD-10-CM

## 2018-10-23 PROCEDURE — 99024 POSTOP FOLLOW-UP VISIT: CPT | Performed by: ORTHOPAEDIC SURGERY

## 2018-10-23 RX ORDER — OMEPRAZOLE 10 MG/1
10 CAPSULE, DELAYED RELEASE ORAL DAILY
COMMUNITY
End: 2021-09-28 | Stop reason: ALTCHOICE

## 2018-10-23 NOTE — PROGRESS NOTES
Procedure: Left total shoulder arthroplasty  Date of procedure: 10/10/18    HPI: Ms. Veronika Hernandez is a 60-year-old who is approximately 2 weeks status post the aforementioned procedure. She indicates that she is doing well. Her pain has been well controlled and she reports really having no pain at this time. She denies having any fevers, chills, sweats or any constitutional symptoms. Physical examination:  Ht 5' 1\" (1.549 m)   Wt 165 lb (74.8 kg)   BMI 31.18 kg/m²   Evaluation of her left shoulder and upper extremity demonstrates her incision to be clean, dry, and intact without any wound dehiscence or drainage. There is some mild to moderate diffuse swelling. Sensation is grossly intact light touch in all dermatomes and she has a 2+ radial pulse with brisk capillary refill in her fingers. Imaging studies: X-rays of the patient's left shoulder completed on 10/20/2018 were independently reviewed demonstrating her total shoulder arthroplasty to be in good alignment. There does appear to be a buildup of cement on the backside of the glenoid implant. No obvious loosening, dislocation or subluxation noted. Impression and plan: Ms. Veronika Hernandez is a 60-year-old who is 2 weeks status post left total shoulder arthroplasty. She is doing well at this time. Within a continue on with her immobilization and daily pendulums for an additional 4 weeks. Her sutures are taken out and clean dressings applied. She may now get her incision wet in the shower. I'll see her back in my clinic in 4 weeks for reevaluation but she was encouraged to return or call earlier with questions and/or concerns.

## 2018-11-02 ENCOUNTER — HOSPITAL ENCOUNTER (OUTPATIENT)
Dept: NUCLEAR MEDICINE | Age: 63
Discharge: HOME | End: 2018-11-04
Payer: COMMERCIAL

## 2018-11-02 ENCOUNTER — HOSPITAL ENCOUNTER (OUTPATIENT)
Dept: NON INVASIVE DIAGNOSTICS | Age: 63
Discharge: HOME | End: 2018-11-02
Payer: COMMERCIAL

## 2018-11-02 VITALS — HEIGHT: 61 IN | WEIGHT: 160 LBS | BODY MASS INDEX: 30.21 KG/M2

## 2018-11-02 DIAGNOSIS — I25.10 CORONARY ARTERY DISEASE INVOLVING NATIVE CORONARY ARTERY OF NATIVE HEART WITHOUT ANGINA PECTORIS: ICD-10-CM

## 2018-11-02 DIAGNOSIS — Z13.9 SCREENING FOR CONDITION: ICD-10-CM

## 2018-11-02 DIAGNOSIS — E78.00 PURE HYPERCHOLESTEROLEMIA: ICD-10-CM

## 2018-11-02 LAB
ALBUMIN SERPL-MCNC: 4.3 G/DL (ref 3.5–5.2)
ALBUMIN/GLOBULIN RATIO: ABNORMAL (ref 1–2.5)
ALP BLD-CCNC: 64 U/L (ref 35–104)
ALT SERPL-CCNC: 7 U/L (ref 5–33)
ANION GAP SERPL CALCULATED.3IONS-SCNC: 11 MMOL/L (ref 9–17)
AST SERPL-CCNC: 11 U/L
BILIRUB SERPL-MCNC: 0.45 MG/DL (ref 0.3–1.2)
BUN BLDV-MCNC: 14 MG/DL (ref 8–23)
BUN/CREAT BLD: ABNORMAL (ref 9–20)
CALCIUM SERPL-MCNC: 10.1 MG/DL (ref 8.6–10.4)
CHLORIDE BLD-SCNC: 100 MMOL/L (ref 98–107)
CHOLESTEROL/HDL RATIO: 3.1
CHOLESTEROL: 172 MG/DL
CO2: 29 MMOL/L (ref 20–31)
CREAT SERPL-MCNC: 0.68 MG/DL (ref 0.5–0.9)
ESTIMATED AVERAGE GLUCOSE: 114 MG/DL
GFR AFRICAN AMERICAN: >60 ML/MIN
GFR NON-AFRICAN AMERICAN: >60 ML/MIN
GFR SERPL CREATININE-BSD FRML MDRD: ABNORMAL ML/MIN/{1.73_M2}
GFR SERPL CREATININE-BSD FRML MDRD: ABNORMAL ML/MIN/{1.73_M2}
GLUCOSE BLD-MCNC: 105 MG/DL (ref 70–99)
HBA1C MFR BLD: 5.6 % (ref 4–6)
HDLC SERPL-MCNC: 55 MG/DL
LDL CHOLESTEROL: 98 MG/DL (ref 0–130)
LV EF: 55 %
LVEF MODALITY: NORMAL
POTASSIUM SERPL-SCNC: 4.4 MMOL/L (ref 3.7–5.3)
SODIUM BLD-SCNC: 140 MMOL/L (ref 135–144)
TOTAL PROTEIN: 7.4 G/DL (ref 6.4–8.3)
TRIGL SERPL-MCNC: 97 MG/DL
VLDLC SERPL CALC-MCNC: NORMAL MG/DL (ref 1–30)

## 2018-11-02 PROCEDURE — 80053 COMPREHEN METABOLIC PANEL: CPT

## 2018-11-02 PROCEDURE — 36415 COLL VENOUS BLD VENIPUNCTURE: CPT

## 2018-11-02 PROCEDURE — 80061 LIPID PANEL: CPT

## 2018-11-02 PROCEDURE — A9500 TC99M SESTAMIBI: HCPCS | Performed by: NURSE PRACTITIONER

## 2018-11-02 PROCEDURE — 6360000002 HC RX W HCPCS: Performed by: NURSE PRACTITIONER

## 2018-11-02 PROCEDURE — 2580000003 HC RX 258: Performed by: NURSE PRACTITIONER

## 2018-11-02 PROCEDURE — 78452 HT MUSCLE IMAGE SPECT MULT: CPT

## 2018-11-02 PROCEDURE — 3430000000 HC RX DIAGNOSTIC RADIOPHARMACEUTICAL: Performed by: NURSE PRACTITIONER

## 2018-11-02 PROCEDURE — 93017 CV STRESS TEST TRACING ONLY: CPT

## 2018-11-02 PROCEDURE — 83036 HEMOGLOBIN GLYCOSYLATED A1C: CPT

## 2018-11-02 RX ORDER — METOPROLOL TARTRATE 5 MG/5ML
2.5 INJECTION INTRAVENOUS PRN
Status: ACTIVE | OUTPATIENT
Start: 2018-11-02 | End: 2018-11-03

## 2018-11-02 RX ORDER — SODIUM CHLORIDE 0.9 % (FLUSH) 0.9 %
10 SYRINGE (ML) INJECTION PRN
Status: ACTIVE | OUTPATIENT
Start: 2018-11-02 | End: 2018-11-03

## 2018-11-02 RX ORDER — 0.9 % SODIUM CHLORIDE 0.9 %
250 INTRAVENOUS SOLUTION INTRAVENOUS ONCE
Status: DISCONTINUED | OUTPATIENT
Start: 2018-11-02 | End: 2018-11-05 | Stop reason: HOSPADM

## 2018-11-02 RX ORDER — AMINOPHYLLINE DIHYDRATE 25 MG/ML
100 INJECTION, SOLUTION INTRAVENOUS
Status: DISCONTINUED | OUTPATIENT
Start: 2018-11-02 | End: 2018-11-02 | Stop reason: CLARIF

## 2018-11-02 RX ORDER — CAFFEINE CITRATE 20 MG/ML
60 SOLUTION INTRAVENOUS
Status: ACTIVE | OUTPATIENT
Start: 2018-11-02 | End: 2018-11-02

## 2018-11-02 RX ORDER — SODIUM CHLORIDE 0.9 % (FLUSH) 0.9 %
10 SYRINGE (ML) INJECTION PRN
Status: DISCONTINUED | OUTPATIENT
Start: 2018-11-02 | End: 2018-11-05 | Stop reason: HOSPADM

## 2018-11-02 RX ORDER — NITROGLYCERIN 0.4 MG/1
0.4 TABLET SUBLINGUAL EVERY 5 MIN PRN
Status: ACTIVE | OUTPATIENT
Start: 2018-11-02 | End: 2018-11-03

## 2018-11-02 RX ADMIN — Medication 10 ML: at 07:07

## 2018-11-02 RX ADMIN — TETRAKIS(2-METHOXYISOBUTYLISOCYANIDE)COPPER(I) TETRAFLUOROBORATE 35.3 MILLICURIE: 1 INJECTION, POWDER, LYOPHILIZED, FOR SOLUTION INTRAVENOUS at 09:31

## 2018-11-02 RX ADMIN — TETRAKIS(2-METHOXYISOBUTYLISOCYANIDE)COPPER(I) TETRAFLUOROBORATE 11.5 MILLICURIE: 1 INJECTION, POWDER, LYOPHILIZED, FOR SOLUTION INTRAVENOUS at 07:07

## 2018-11-02 RX ADMIN — REGADENOSON 0.4 MG: 0.08 INJECTION, SOLUTION INTRAVENOUS at 09:21

## 2018-11-02 RX ADMIN — Medication 10 ML: at 09:22

## 2018-11-20 ENCOUNTER — OFFICE VISIT (OUTPATIENT)
Dept: ORTHOPEDIC SURGERY | Age: 63
End: 2018-11-20

## 2018-11-20 VITALS — WEIGHT: 165 LBS | HEIGHT: 61 IN | BODY MASS INDEX: 31.15 KG/M2

## 2018-11-20 DIAGNOSIS — Z96.612 STATUS POST REVERSE TOTAL REPLACEMENT OF LEFT SHOULDER: Primary | ICD-10-CM

## 2018-11-20 DIAGNOSIS — Z96.612 S/P SHOULDER REPLACEMENT, LEFT: ICD-10-CM

## 2018-11-20 PROCEDURE — 99024 POSTOP FOLLOW-UP VISIT: CPT | Performed by: ORTHOPAEDIC SURGERY

## 2018-11-20 NOTE — PROGRESS NOTES
Procedure: Left total shoulder arthroplasty  Date of procedure: 10/10/18    HPI: Ms. Ball is a 78-year-old who is approximately 6 weeks status post the aforementioned procedure. She continues to do well at this time reporting minimal pain. She has been compliant with her immobilizer and pendulum exercises. Physical examination:  Ht 5' 1\" (1.549 m)   Wt 165 lb (74.8 kg)   BMI 31.18 kg/m²    Evaluation of her left shoulder and upper extremity demonstrates her incision to be healed. There is no warmth, erythema, or notable swelling present. Sensation is grossly intact light touch in all dermatomes and she has a 2+ radial pulse with brisk capillary refill in her fingers. Imaging studies: X-rays of the patient's left shoulder completed on 11/20/18 were independently reviewed demonstrating her total shoulder arthroplasty to be in good alignment. The previously noted buildup and cement behind the posterior glenoid is again present. It actually corrects her version. Impression and plan: Ms. Ball is a 78-year-old who is 6 weeks status post left total shoulder arthroplasty. She is doing well clinically. I did have a discussion with the patient today with regards to the buildup of cement over the posterior aspect of the glenoid. As noted above it does actually corrected improve her version. At this time we will monitor it but I did discuss the potential of loosening which would need to be addressed if it occurs. Today she came begin to wean out of and discontinue her immobilizer. She was provided and taught a home exercise program focusing on active assisted range of motion. She may start to use the extremity for light activities of daily living. I'll see her back for reevaluation in 6 weeks but she was encouraged to return or call earlier with questions and/or concerns.

## 2019-01-03 ENCOUNTER — OFFICE VISIT (OUTPATIENT)
Dept: ORTHOPEDIC SURGERY | Age: 64
End: 2019-01-03

## 2019-01-03 VITALS — BODY MASS INDEX: 31.72 KG/M2 | HEIGHT: 61 IN | WEIGHT: 168 LBS

## 2019-01-03 DIAGNOSIS — Z96.612 S/P SHOULDER REPLACEMENT, LEFT: Primary | ICD-10-CM

## 2019-01-03 PROCEDURE — 99024 POSTOP FOLLOW-UP VISIT: CPT | Performed by: ORTHOPAEDIC SURGERY

## 2019-04-03 DIAGNOSIS — Z96.612 S/P SHOULDER REPLACEMENT, LEFT: Primary | ICD-10-CM

## 2019-04-04 ENCOUNTER — OFFICE VISIT (OUTPATIENT)
Dept: ORTHOPEDIC SURGERY | Age: 64
End: 2019-04-04
Payer: COMMERCIAL

## 2019-04-04 VITALS — WEIGHT: 167.99 LBS | BODY MASS INDEX: 31.72 KG/M2 | HEIGHT: 61 IN

## 2019-04-04 DIAGNOSIS — Z96.612 S/P SHOULDER REPLACEMENT, LEFT: Primary | ICD-10-CM

## 2019-04-04 PROCEDURE — 99213 OFFICE O/P EST LOW 20 MIN: CPT | Performed by: ORTHOPAEDIC SURGERY

## 2019-04-04 PROCEDURE — 3017F COLORECTAL CA SCREEN DOC REV: CPT | Performed by: ORTHOPAEDIC SURGERY

## 2019-04-04 PROCEDURE — G8417 CALC BMI ABV UP PARAM F/U: HCPCS | Performed by: ORTHOPAEDIC SURGERY

## 2019-04-04 PROCEDURE — 1123F ACP DISCUSS/DSCN MKR DOCD: CPT | Performed by: ORTHOPAEDIC SURGERY

## 2019-04-04 PROCEDURE — 4040F PNEUMOC VAC/ADMIN/RCVD: CPT | Performed by: ORTHOPAEDIC SURGERY

## 2019-04-04 PROCEDURE — G8399 PT W/DXA RESULTS DOCUMENT: HCPCS | Performed by: ORTHOPAEDIC SURGERY

## 2019-04-04 PROCEDURE — 4004F PT TOBACCO SCREEN RCVD TLK: CPT | Performed by: ORTHOPAEDIC SURGERY

## 2019-04-04 PROCEDURE — 1090F PRES/ABSN URINE INCON ASSESS: CPT | Performed by: ORTHOPAEDIC SURGERY

## 2019-04-04 PROCEDURE — G8427 DOCREV CUR MEDS BY ELIG CLIN: HCPCS | Performed by: ORTHOPAEDIC SURGERY

## 2019-04-04 NOTE — PROGRESS NOTES
Procedure: Left Total Shoulder Arthroplasty  Date of Procedure: 10/10/18    HPI: Sushil Ng is approximately 6 months status post the aforementioned procedure. she is doing well clinically. she reports having minimal to no pain at this time. she has been compliant with her home exercise program and continues to deny having any fevers, chills, or sweats. Physical examination:  Ht 5' 0.98\" (1.549 m)   Wt 167 lb 15.9 oz (76.2 kg)   BMI 31.76 kg/m²   General Appearance: alert, well appearing, and in no distress  Mental Status: alert, oriented to person, place, and time  Evaluation of the left shoulder and upper extremity demonstrates her shoulder incision to be healed appropriately. No wound dehiscence, drainage, warmth or erythema is appreciated. Sensation is grossly intact light touch in all dermatomes and she has a 2+ radial pulse with brisk capillary refill in all her fingers. ROM: (Degrees)    Right   A P   Left   A P    Elevation  150    Elevation  130 135  Abduction  165    Abduction  115  140  ER   70    ER   45 60  IR   L3    IR   L3   90 abd/ER      90 abd/ER     90 abd/IR      90 abd/IR     Crepitation  No    Crepitation No      Muscle strength:    Right       Left    Deltoid   5    Deltoid   5  Supraspinatus  5    Supraspinatus  5  ER   5    ER   5  IR   5    IR   5    Special tests    Right   Rotator Cuff    Left    n   Painful arc    n   n   Pain with ER    n    n   Neer's     n    n   Hawkin's    n    n   Drop Arm    n  n   Lift off/Belly Press   n  n   ER Lag    n      Imaging Studies: X-rays of the left shoulder completed on 4/4/19 were independently reviewed demonstrating her left shoulder implant to be in acceptable alignment without any obvious fracture, dislocation, or subluxation. Impression and plan: Sushil Ng is approximately 6 months status post a left total  shoulder arthroplasty. she is doing well clinically at this time and is happy with her outcome.   She indicates

## 2019-04-25 ENCOUNTER — HOSPITAL ENCOUNTER (OUTPATIENT)
Facility: CLINIC | Age: 64
Discharge: HOME | End: 2019-04-27
Payer: COMMERCIAL

## 2019-04-25 ENCOUNTER — HOSPITAL ENCOUNTER (OUTPATIENT)
Dept: GENERAL RADIOLOGY | Facility: CLINIC | Age: 64
Discharge: HOME | End: 2019-04-27
Payer: COMMERCIAL

## 2019-04-25 DIAGNOSIS — M79.671 RIGHT FOOT PAIN: ICD-10-CM

## 2019-04-25 PROCEDURE — 73630 X-RAY EXAM OF FOOT: CPT

## 2019-06-11 ENCOUNTER — OFFICE VISIT (OUTPATIENT)
Dept: PODIATRY | Age: 64
End: 2019-06-11
Payer: COMMERCIAL

## 2019-06-11 VITALS — WEIGHT: 160 LBS | BODY MASS INDEX: 30.21 KG/M2 | HEIGHT: 61 IN

## 2019-06-11 DIAGNOSIS — M19.071 PRIMARY OSTEOARTHRITIS OF RIGHT FOOT: ICD-10-CM

## 2019-06-11 DIAGNOSIS — M79.671 RIGHT FOOT PAIN: Primary | ICD-10-CM

## 2019-06-11 DIAGNOSIS — M65.9 SYNOVITIS OF RIGHT FOOT: ICD-10-CM

## 2019-06-11 PROCEDURE — G8399 PT W/DXA RESULTS DOCUMENT: HCPCS | Performed by: PODIATRIST

## 2019-06-11 PROCEDURE — 4004F PT TOBACCO SCREEN RCVD TLK: CPT | Performed by: PODIATRIST

## 2019-06-11 PROCEDURE — 99213 OFFICE O/P EST LOW 20 MIN: CPT | Performed by: PODIATRIST

## 2019-06-11 PROCEDURE — 3017F COLORECTAL CA SCREEN DOC REV: CPT | Performed by: PODIATRIST

## 2019-06-11 PROCEDURE — G8417 CALC BMI ABV UP PARAM F/U: HCPCS | Performed by: PODIATRIST

## 2019-06-11 PROCEDURE — 4040F PNEUMOC VAC/ADMIN/RCVD: CPT | Performed by: PODIATRIST

## 2019-06-11 PROCEDURE — G8427 DOCREV CUR MEDS BY ELIG CLIN: HCPCS | Performed by: PODIATRIST

## 2019-06-11 PROCEDURE — 1123F ACP DISCUSS/DSCN MKR DOCD: CPT | Performed by: PODIATRIST

## 2019-06-11 PROCEDURE — 1090F PRES/ABSN URINE INCON ASSESS: CPT | Performed by: PODIATRIST

## 2019-06-11 ASSESSMENT — ENCOUNTER SYMPTOMS
VOMITING: 0
NAUSEA: 0
CONSTIPATION: 0
COLOR CHANGE: 0
DIARRHEA: 0

## 2019-06-11 NOTE — PROGRESS NOTES
[facility]  Return Patient     Guillermina Mccoy 71 y.o. female that presents for follow-up of   Chief Complaint   Patient presents with    Foot Pain     right foot and ankle pain, started 3-4 months ago       Symptoms began 4 month(s) ago and are unchanged . Patient relates pain is Present. Pain is rated 3 out of 10 and is described as waxing and waning. Treatments prior to today's visit include: more supportive shoes, this has helped. She cannot take NSAIDS due to bleeding disorder. Pain started gradually, no injury, has some swelling. Had an xray. Currently denies F/C/N/V. I did a tarsal tunnel release in  left foot. 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 times due to Delta storage pool platelet disorder    Hepatitis C     H/O after blood transfusion    History of blood transfusion     had transfusions 3 times in the past    Hx of blood clots     Osteoarthritis     knees    Ovarian cyst rupture     H/O, was hemorrhaging, had surgery    Placenta accreta     was bleeding internally, had  and hyst at the same time    PONV (postoperative nausea and vomiting)     Prolapsed bladder     had surgery to repair    Uterine rupture     due to placenta accreta, had hyst with     UTI (lower urinary tract infection)     frequent       Prior to Admission medications    Medication Sig Start Date End Date Taking?  Authorizing Provider   liraglutide-weight management (SAXENDA) 18 MG/3ML SOPN Inject 1.2 mg into the skin Daily 19  Yes Santos Lopez, BELKYS - CNP   DULoxetine (CYMBALTA) 60 MG extended release capsule TAKE ONE CAPSULE BY MOUTH DAILY 6/27/18  Yes Levorn Parcel, APRN - CNP   NITROFURANTOIN PO Take by mouth as needed   Yes Historical Provider, MD   omeprazole (PRILOSEC) 10 MG delayed release capsule Take 10 mg by mouth daily    Historical Provider, MD   fluticasone (FLONASE) 50 MCG/ACT nasal spray 1 spray by Nasal route daily 8/14/17   D'Hanis Bound, APRN - CNP   Cetirizine HCl (ZYRTEC ALLERGY) 10 MG CAPS Take 1 capsule by mouth daily as needed (nasal congestion, cough) 8/14/17   D'Hanis Bound, APRN - CNP   docusate sodium (COLACE, DULCOLAX) 100 MG CAPS Take 100 mg by mouth 2 times daily as needed  9/26/18   Ava Patel MD       Review of Systems   Constitutional: Negative for chills, diaphoresis, fatigue, fever and unexpected weight change. Cardiovascular: Negative for leg swelling. Gastrointestinal: Negative for constipation, diarrhea, nausea and vomiting. Musculoskeletal: Positive for arthralgias and gait problem. Negative for joint swelling. Skin: Negative for color change, pallor, rash and wound. Neurological: Negative for weakness and numbness. Lower Extremity Physical Examination:     Vitals: There were no vitals filed for this visit. General: AAO x 3 in NAD. Integument:   Warm, dry, supple, Bilateral.  Open lesion absent, Bilateral.      Vascular: DP and PT pulses palpable 2/4, Bilateral.  CFT <3 seconds, Bilateral.  Hair growth absent to the level of the digits, Bilateral.  Edema present, Right. Varicosities present, Bilateral. Erythema absent, Bilateral    Neurological:  Sensation absent to light touch to level of digits, Left. Musculoskeletal: Muscle strength 5/5, Bilateral.  Pain present upon palpation of dorsal T-N joint, more severe over the dorsal and medial thad-cun joint with localized swelling, pain with plantar flexion of the foot, Right.  normal medial longitudinal arch, Bilateral.  Ankle ROM normal,Bilateral.      Radiographs: 3 views right Foot: Degenerative changes in the midfoot with the most severe being the medial cuneiform navicular joint. Asessment: Patient is a 71 y.o. female with:   1. Right foot pain    2. Primary osteoarthritis of right foot    3. Synovitis of right foot        Plan: Patient examined and evaluated. Current condition and treatment options discussed in detail. Advised pt to wear good shoes, no barefoot walking. Verbal and written instructions given to patient. Contact office with any questions/problems/concerns. 1) Voltaren gel  2) Powersteps  3) if no better, in 4 weeks, will inject the trigger point. Orders Placed This Encounter   Procedures    XR FOOT RIGHT (MIN 3 VIEWS)     No orders of the defined types were placed in this encounter. RTC in 4week(s).     6/11/2019

## 2019-06-12 ENCOUNTER — TELEPHONE (OUTPATIENT)
Dept: PODIATRY | Age: 64
End: 2019-06-12

## 2019-06-13 NOTE — TELEPHONE ENCOUNTER
This denial for medication was from the prior auth, I will call and let them know your concern and see if they will change the outcome.

## 2019-07-09 ENCOUNTER — OFFICE VISIT (OUTPATIENT)
Dept: PODIATRY | Age: 64
End: 2019-07-09
Payer: COMMERCIAL

## 2019-07-09 VITALS — HEIGHT: 61 IN | BODY MASS INDEX: 29.07 KG/M2 | WEIGHT: 154 LBS

## 2019-07-09 DIAGNOSIS — M65.9 SYNOVITIS OF RIGHT FOOT: ICD-10-CM

## 2019-07-09 DIAGNOSIS — M79.671 RIGHT FOOT PAIN: ICD-10-CM

## 2019-07-09 DIAGNOSIS — M19.071 PRIMARY OSTEOARTHRITIS OF RIGHT FOOT: Primary | ICD-10-CM

## 2019-07-09 PROCEDURE — 1090F PRES/ABSN URINE INCON ASSESS: CPT | Performed by: PODIATRIST

## 2019-07-09 PROCEDURE — 1123F ACP DISCUSS/DSCN MKR DOCD: CPT | Performed by: PODIATRIST

## 2019-07-09 PROCEDURE — G8399 PT W/DXA RESULTS DOCUMENT: HCPCS | Performed by: PODIATRIST

## 2019-07-09 PROCEDURE — 4040F PNEUMOC VAC/ADMIN/RCVD: CPT | Performed by: PODIATRIST

## 2019-07-09 PROCEDURE — 3017F COLORECTAL CA SCREEN DOC REV: CPT | Performed by: PODIATRIST

## 2019-07-09 PROCEDURE — 20550 NJX 1 TENDON SHEATH/LIGAMENT: CPT | Performed by: PODIATRIST

## 2019-07-09 PROCEDURE — 99213 OFFICE O/P EST LOW 20 MIN: CPT | Performed by: PODIATRIST

## 2019-07-09 PROCEDURE — G8417 CALC BMI ABV UP PARAM F/U: HCPCS | Performed by: PODIATRIST

## 2019-07-09 PROCEDURE — G8427 DOCREV CUR MEDS BY ELIG CLIN: HCPCS | Performed by: PODIATRIST

## 2019-07-09 PROCEDURE — 4004F PT TOBACCO SCREEN RCVD TLK: CPT | Performed by: PODIATRIST

## 2019-07-09 RX ORDER — BUPIVACAINE HYDROCHLORIDE 5 MG/ML
1 INJECTION, SOLUTION PERINEURAL ONCE
Status: COMPLETED | OUTPATIENT
Start: 2019-07-09 | End: 2019-07-09

## 2019-07-09 RX ORDER — TESTOSTERONE CYPIONATE 200 MG/ML
6 INJECTION INTRAMUSCULAR ONCE
Status: COMPLETED | OUTPATIENT
Start: 2019-07-09 | End: 2019-07-09

## 2019-07-09 RX ADMIN — TESTOSTERONE CYPIONATE 6 MG: 200 INJECTION INTRAMUSCULAR at 18:00

## 2019-07-09 RX ADMIN — BUPIVACAINE HYDROCHLORIDE 5 MG: 5 INJECTION, SOLUTION PERINEURAL at 18:00

## 2019-07-09 ASSESSMENT — ENCOUNTER SYMPTOMS
NAUSEA: 0
DIARRHEA: 0
VOMITING: 0
COLOR CHANGE: 0
CONSTIPATION: 0

## 2019-07-09 NOTE — PROGRESS NOTES
[facility]  Return Patient     Abdi Barker 71 y.o. female that presents for follow-up of   Chief Complaint   Patient presents with    Check-Up     reassess right foot, doing better     She is better with the gel and powerstpes. Symptoms began 4 month(s) ago and are unchanged . Patient relates pain is Present. Pain is rated 3 out of 10 and is described as waxing and waning. Treatments prior to today's visit include: more supportive shoes, this has helped. She cannot take NSAIDS due to bleeding disorder. Pain started gradually, no injury, has some swelling. Had an xray. Currently denies F/C/N/V. I did a tarsal tunnel release in  left foot. 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    Hepatitis C     H/O after blood transfusion    History of blood transfusion     had transfusions 3 times in the past    Hx of blood clots     Osteoarthritis     knees    Ovarian cyst rupture     H/O, was hemorrhaging, had surgery    Placenta accreta     was bleeding internally, had  and hyst at the same time    PONV (postoperative nausea and vomiting)     Prolapsed bladder     had surgery to repair    Uterine rupture     due to placenta accreta, had hyst with     UTI (lower urinary tract infection)     frequent       Prior to Admission medications    Medication Sig Start Date End Date Taking?  Authorizing Provider   SAXENDA 18 MG/3ML SOPN INJECT 1.2MG INTO THE SKIN DAILY 19  Yes BELKYS Biggs - CNP   diclofenac sodium (VOLTAREN) 1 % GEL Apply 4 g Right. normal medial longitudinal arch, Bilateral.  Ankle ROM normal,Bilateral.      Radiographs: 3 views right Foot:  Degenerative changes in the midfoot with the most severe being the medial cuneiform navicular joint. Asessment: Patient is a 71 y.o. female with:   1. Primary osteoarthritis of right foot    2. Synovitis of right foot    3. Right foot pain        Plan: Patient examined and evaluated. Current condition and treatment options discussed in detail. Advised pt to wear good shoes, no barefoot walking. Verbal and written instructions given to patient. Contact office with any questions/problems/concerns. 1) Voltaren gel  2) Powersteps  3) Injection Type:  Trigger point/Ligament      Anatomic Location: right medial cun-thad jiont    Verbal consent obtained from patient for injection after all questions answered. Right foot palpated and most tender location identified. This area was prepped with an alcohol swab and 3 cc total of 1cc 0.5% marcaine plain, 1 cc dexamethasone phosphate, and 1 cc depo medrol was injected to the right. A band-aid was applied, patient advised of possible local steroid reaction symptoms, and patient instructed to limit activities to this area for 24 hours. Medications ordered during this encounter were injected into the right. Orders Placed This Encounter   Procedures    MD INJECT TENDON SHEATH/LIGAMENT     Orders Placed This Encounter   Medications    bupivacaine (MARCAINE) 0.5 % injection 5 mg    Dexamethasone Sodium Phosphate injection 6 mg    methylPREDNISolone Acetate SUSP 10 mg        RTC in 4-6week(s).     7/9/2019

## 2019-07-19 ENCOUNTER — TELEPHONE (OUTPATIENT)
Dept: ORTHOPEDIC SURGERY | Age: 64
End: 2019-07-19

## 2019-07-24 DIAGNOSIS — M25.512 ACUTE PAIN OF LEFT SHOULDER: Primary | ICD-10-CM

## 2019-07-25 ENCOUNTER — OFFICE VISIT (OUTPATIENT)
Dept: ORTHOPEDIC SURGERY | Age: 64
End: 2019-07-25
Payer: COMMERCIAL

## 2019-07-25 DIAGNOSIS — Z96.612 S/P SHOULDER REPLACEMENT, LEFT: Primary | ICD-10-CM

## 2019-07-25 PROCEDURE — 1123F ACP DISCUSS/DSCN MKR DOCD: CPT | Performed by: ORTHOPAEDIC SURGERY

## 2019-07-25 PROCEDURE — 1090F PRES/ABSN URINE INCON ASSESS: CPT | Performed by: ORTHOPAEDIC SURGERY

## 2019-07-25 PROCEDURE — 4040F PNEUMOC VAC/ADMIN/RCVD: CPT | Performed by: ORTHOPAEDIC SURGERY

## 2019-07-25 PROCEDURE — 3017F COLORECTAL CA SCREEN DOC REV: CPT | Performed by: ORTHOPAEDIC SURGERY

## 2019-07-25 PROCEDURE — 99213 OFFICE O/P EST LOW 20 MIN: CPT | Performed by: ORTHOPAEDIC SURGERY

## 2019-07-25 PROCEDURE — G8399 PT W/DXA RESULTS DOCUMENT: HCPCS | Performed by: ORTHOPAEDIC SURGERY

## 2019-07-25 PROCEDURE — G8417 CALC BMI ABV UP PARAM F/U: HCPCS | Performed by: ORTHOPAEDIC SURGERY

## 2019-07-25 PROCEDURE — 4004F PT TOBACCO SCREEN RCVD TLK: CPT | Performed by: ORTHOPAEDIC SURGERY

## 2019-07-25 PROCEDURE — G8427 DOCREV CUR MEDS BY ELIG CLIN: HCPCS | Performed by: ORTHOPAEDIC SURGERY

## 2019-07-25 NOTE — PROGRESS NOTES
Procedure: Left Total Shoulder Arthroplasty  Date of Procedure: 10/10/18    HPI: Maxim Cody is approximately 9 months status post the aforementioned procedure. She states that she was doing well up until this past weekend. She was taken off the sheets on her bed as she done multiple times before and had sharp pain in the shoulder. It was quite painful to move. It has gradually gotten better but she still continues to experience some discomfort. She indicates that she has done a lot more with the shoulder and really has had no issues and so she is baffled as to why this time around this has resulted in pain. She also denies having any fevers, chills or sweats. She denies any popping, locking, or catching in the shoulder. Her pain is localized to the anterior aspect of her left shoulder. Physical examination:     General Appearance: alert, well appearing, and in no distress  Mental Status: alert, oriented to person, place, and time  Evaluation of the left shoulder and upper extremity demonstrates her shoulder incision to be healed appropriately. No wound dehiscence, drainage, warmth or erythema is appreciated. Sensation is grossly intact light touch in all dermatomes and she has a 2+ radial pulse with brisk capillary refill in all her fingers. She is tender to palpation at the level of the coracoid in her right shoulder.     ROM: (Degrees)    Right   A P   Left   A P    Elevation  150    Elevation  135   Abduction  165    Abduction  120    ER   70    ER   60  IR   L3    IR   L3   90 abd/ER      90 abd/ER     90 abd/IR      90 abd/IR     Crepitation  No    Crepitation No      Muscle strength:    Right       Left    Deltoid   5    Deltoid   5  Supraspinatus  5    Supraspinatus  5  ER   5    ER   5  IR   5    IR   5    Special tests    Right   Rotator Cuff    Left    n   Painful arc    y   n   Pain with ER    n    n   Neer's     n    n   Hawkin's    n    n   Drop Arm    n  n   Lift off/Belly Press   n  n   ER Lag    n      Imaging Studies: 4 x-ray views of the left shoulder completed on 7/25/19 were independently reviewed demonstrating her left shoulder implant to be in acceptable alignment without any obvious fracture, dislocation, or subluxation. No interval change in the lucency noted at the base of the glenoid component, cervically at the base of the cement mantle for this component. Impression and plan: Josefa Funez is approximately 9 months status post a left total  shoulder arthroplasty. As noted above she developed acute onset pain which appears to be localized to the coracoid process. No obvious fracture noted on her x-ray. I had a discussion with the patient today with regards to this. It does not appear that her implant is grossly unstable. She appears to have good strength testing her subscapularis. At this time I recommend symptomatic treatment. She indicates that she does have some Voltaren gel at home which she was encouraged to apply 4 times a day to the level of the coracoid. She was encouraged to scale back on her activities and allow the shoulder to rest.  She has her one-year appointment coming up in the next 3 months. She was encouraged to keep this appointment but she may return or call earlier with questions under concerns.

## 2019-08-21 ENCOUNTER — OFFICE VISIT (OUTPATIENT)
Dept: PODIATRY | Age: 64
End: 2019-08-21
Payer: COMMERCIAL

## 2019-08-21 VITALS — BODY MASS INDEX: 27.38 KG/M2 | WEIGHT: 145 LBS | HEIGHT: 61 IN

## 2019-08-21 DIAGNOSIS — M79.671 RIGHT FOOT PAIN: ICD-10-CM

## 2019-08-21 DIAGNOSIS — M65.9 SYNOVITIS OF RIGHT FOOT: ICD-10-CM

## 2019-08-21 DIAGNOSIS — M19.071 PRIMARY OSTEOARTHRITIS OF RIGHT FOOT: Primary | ICD-10-CM

## 2019-08-21 PROCEDURE — 1090F PRES/ABSN URINE INCON ASSESS: CPT | Performed by: PODIATRIST

## 2019-08-21 PROCEDURE — 99213 OFFICE O/P EST LOW 20 MIN: CPT | Performed by: PODIATRIST

## 2019-08-21 PROCEDURE — 3017F COLORECTAL CA SCREEN DOC REV: CPT | Performed by: PODIATRIST

## 2019-08-21 PROCEDURE — 4040F PNEUMOC VAC/ADMIN/RCVD: CPT | Performed by: PODIATRIST

## 2019-08-21 PROCEDURE — G8427 DOCREV CUR MEDS BY ELIG CLIN: HCPCS | Performed by: PODIATRIST

## 2019-08-21 PROCEDURE — G8417 CALC BMI ABV UP PARAM F/U: HCPCS | Performed by: PODIATRIST

## 2019-08-21 PROCEDURE — 4004F PT TOBACCO SCREEN RCVD TLK: CPT | Performed by: PODIATRIST

## 2019-08-21 PROCEDURE — G8399 PT W/DXA RESULTS DOCUMENT: HCPCS | Performed by: PODIATRIST

## 2019-08-21 PROCEDURE — 1123F ACP DISCUSS/DSCN MKR DOCD: CPT | Performed by: PODIATRIST

## 2019-08-21 ASSESSMENT — ENCOUNTER SYMPTOMS
VOMITING: 0
COLOR CHANGE: 0
CONSTIPATION: 0
NAUSEA: 0
DIARRHEA: 0

## 2019-08-21 NOTE — PROGRESS NOTES
[facility]  Return Patient     Maxim Cody 71 y.o. female that presents for follow-up of   Chief Complaint   Patient presents with    Follow-up     RIGHT FOOT IS MUCH BETTER NO CONCERNS     She is better with the gel and powerstpes. She has not pain now after the injection. Symptoms began 4 month(s) ago and are unchanged . Patient relates pain is Present. Pain is rated 3 out of 10 and is described as waxing and waning. Treatments prior to today's visit include: more supportive shoes, this has helped. She cannot take NSAIDS due to bleeding disorder. Pain started gradually, no injury, has some swelling. Had an xray. Currently denies F/C/N/V. I did a tarsal tunnel release in  left foot. 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 times due to Delta storage pool platelet disorder    Hepatitis C     H/O after blood transfusion    History of blood transfusion     had transfusions 3 times in the past    Hx of blood clots     Osteoarthritis     knees    Ovarian cyst rupture     H/O, was hemorrhaging, had surgery    Placenta accreta     was bleeding internally, had  and hyst at the same time    PONV (postoperative nausea and vomiting)     Prolapsed bladder     had surgery to repair    Uterine rupture     due to placenta accreta, had hyst with     UTI (lower urinary tract infection)     frequent       Prior to Admission medications    Medication Sig Start Date End Date Taking?  Authorizing Provider   SAXENDA 18 MG/3ML SOPN INJECT 1.2MG INTO THE SKIN DAILY 19  Yes Ashley Caraballo, APRN - CNP

## 2019-10-09 DIAGNOSIS — Z96.612 S/P SHOULDER REPLACEMENT, LEFT: Primary | ICD-10-CM

## 2019-10-10 ENCOUNTER — OFFICE VISIT (OUTPATIENT)
Dept: ORTHOPEDIC SURGERY | Age: 64
End: 2019-10-10
Payer: COMMERCIAL

## 2019-10-10 VITALS — WEIGHT: 145 LBS | BODY MASS INDEX: 27.38 KG/M2 | HEIGHT: 61 IN

## 2019-10-10 DIAGNOSIS — Z96.612 S/P SHOULDER REPLACEMENT, LEFT: Primary | ICD-10-CM

## 2019-10-10 PROCEDURE — 1123F ACP DISCUSS/DSCN MKR DOCD: CPT | Performed by: ORTHOPAEDIC SURGERY

## 2019-10-10 PROCEDURE — 4004F PT TOBACCO SCREEN RCVD TLK: CPT | Performed by: ORTHOPAEDIC SURGERY

## 2019-10-10 PROCEDURE — 4040F PNEUMOC VAC/ADMIN/RCVD: CPT | Performed by: ORTHOPAEDIC SURGERY

## 2019-10-10 PROCEDURE — G8427 DOCREV CUR MEDS BY ELIG CLIN: HCPCS | Performed by: ORTHOPAEDIC SURGERY

## 2019-10-10 PROCEDURE — G8417 CALC BMI ABV UP PARAM F/U: HCPCS | Performed by: ORTHOPAEDIC SURGERY

## 2019-10-10 PROCEDURE — G8399 PT W/DXA RESULTS DOCUMENT: HCPCS | Performed by: ORTHOPAEDIC SURGERY

## 2019-10-10 PROCEDURE — G8484 FLU IMMUNIZE NO ADMIN: HCPCS | Performed by: ORTHOPAEDIC SURGERY

## 2019-10-10 PROCEDURE — 1090F PRES/ABSN URINE INCON ASSESS: CPT | Performed by: ORTHOPAEDIC SURGERY

## 2019-10-10 PROCEDURE — 3017F COLORECTAL CA SCREEN DOC REV: CPT | Performed by: ORTHOPAEDIC SURGERY

## 2019-10-10 PROCEDURE — 99213 OFFICE O/P EST LOW 20 MIN: CPT | Performed by: ORTHOPAEDIC SURGERY

## 2020-03-04 PROBLEM — L97.521 CHRONIC ULCER OF LEFT FOOT LIMITED TO BREAKDOWN OF SKIN (HCC): Status: RESOLVED | Noted: 2018-04-04 | Resolved: 2020-03-04

## 2020-05-12 ENCOUNTER — HOSPITAL ENCOUNTER (OUTPATIENT)
Age: 65
Setting detail: SPECIMEN
Discharge: HOME | End: 2020-05-12
Payer: MEDICARE

## 2020-05-13 LAB
CULTURE: NORMAL
Lab: NORMAL
SPECIMEN DESCRIPTION: NORMAL

## 2021-06-03 PROBLEM — N18.5 CHRONIC KIDNEY DISEASE, STAGE 5 (HCC): Status: ACTIVE | Noted: 2021-06-03

## 2021-09-28 ENCOUNTER — OFFICE VISIT (OUTPATIENT)
Dept: PODIATRY | Age: 66
End: 2021-09-28
Payer: MEDICARE

## 2021-09-28 VITALS — HEIGHT: 61 IN | BODY MASS INDEX: 23.6 KG/M2 | WEIGHT: 125 LBS

## 2021-09-28 DIAGNOSIS — M25.572 SINUS TARSITIS, LEFT: ICD-10-CM

## 2021-09-28 DIAGNOSIS — M25.572 LEFT ANKLE PAIN, UNSPECIFIED CHRONICITY: Primary | ICD-10-CM

## 2021-09-28 DIAGNOSIS — M19.072 OSTEOARTHRITIS OF SUBTALAR JOINT, LEFT: ICD-10-CM

## 2021-09-28 PROCEDURE — 1123F ACP DISCUSS/DSCN MKR DOCD: CPT | Performed by: PODIATRIST

## 2021-09-28 PROCEDURE — G8420 CALC BMI NORM PARAMETERS: HCPCS | Performed by: PODIATRIST

## 2021-09-28 PROCEDURE — 1090F PRES/ABSN URINE INCON ASSESS: CPT | Performed by: PODIATRIST

## 2021-09-28 PROCEDURE — 99213 OFFICE O/P EST LOW 20 MIN: CPT | Performed by: PODIATRIST

## 2021-09-28 PROCEDURE — 3017F COLORECTAL CA SCREEN DOC REV: CPT | Performed by: PODIATRIST

## 2021-09-28 PROCEDURE — 20605 DRAIN/INJ JOINT/BURSA W/O US: CPT | Performed by: PODIATRIST

## 2021-09-28 PROCEDURE — G8427 DOCREV CUR MEDS BY ELIG CLIN: HCPCS | Performed by: PODIATRIST

## 2021-09-28 PROCEDURE — 4004F PT TOBACCO SCREEN RCVD TLK: CPT | Performed by: PODIATRIST

## 2021-09-28 PROCEDURE — 4040F PNEUMOC VAC/ADMIN/RCVD: CPT | Performed by: PODIATRIST

## 2021-09-28 PROCEDURE — G8399 PT W/DXA RESULTS DOCUMENT: HCPCS | Performed by: PODIATRIST

## 2021-09-28 RX ORDER — TESTOSTERONE CYPIONATE 200 MG/ML
6 INJECTION INTRAMUSCULAR ONCE
Status: COMPLETED | OUTPATIENT
Start: 2021-09-28 | End: 2021-09-28

## 2021-09-28 RX ORDER — BUPIVACAINE HYDROCHLORIDE 5 MG/ML
1 INJECTION, SOLUTION PERINEURAL ONCE
Status: COMPLETED | OUTPATIENT
Start: 2021-09-28 | End: 2021-09-28

## 2021-09-28 RX ADMIN — TESTOSTERONE CYPIONATE 6 MG: 200 INJECTION INTRAMUSCULAR at 17:45

## 2021-09-28 RX ADMIN — BUPIVACAINE HYDROCHLORIDE 5 MG: 5 INJECTION, SOLUTION PERINEURAL at 17:45

## 2021-09-29 ASSESSMENT — ENCOUNTER SYMPTOMS
CONSTIPATION: 0
COLOR CHANGE: 0
DIARRHEA: 0
VOMITING: 0
NAUSEA: 0

## 2021-10-21 ENCOUNTER — OFFICE VISIT (OUTPATIENT)
Dept: ORTHOPEDIC SURGERY | Age: 66
End: 2021-10-21
Payer: MEDICARE

## 2021-10-21 VITALS — BODY MASS INDEX: 23.6 KG/M2 | WEIGHT: 125 LBS | RESPIRATION RATE: 14 BRPM | HEIGHT: 61 IN

## 2021-10-21 DIAGNOSIS — Z96.653 HISTORY OF TOTAL KNEE ARTHROPLASTY, BILATERAL: Primary | ICD-10-CM

## 2021-10-21 DIAGNOSIS — M25.562 ACUTE PAIN OF LEFT KNEE: ICD-10-CM

## 2021-10-21 PROCEDURE — 1090F PRES/ABSN URINE INCON ASSESS: CPT | Performed by: PHYSICIAN ASSISTANT

## 2021-10-21 PROCEDURE — G8484 FLU IMMUNIZE NO ADMIN: HCPCS | Performed by: PHYSICIAN ASSISTANT

## 2021-10-21 PROCEDURE — G8399 PT W/DXA RESULTS DOCUMENT: HCPCS | Performed by: PHYSICIAN ASSISTANT

## 2021-10-21 PROCEDURE — 4004F PT TOBACCO SCREEN RCVD TLK: CPT | Performed by: PHYSICIAN ASSISTANT

## 2021-10-21 PROCEDURE — 4040F PNEUMOC VAC/ADMIN/RCVD: CPT | Performed by: PHYSICIAN ASSISTANT

## 2021-10-21 PROCEDURE — G8427 DOCREV CUR MEDS BY ELIG CLIN: HCPCS | Performed by: PHYSICIAN ASSISTANT

## 2021-10-21 PROCEDURE — 99204 OFFICE O/P NEW MOD 45 MIN: CPT | Performed by: PHYSICIAN ASSISTANT

## 2021-10-21 PROCEDURE — G8420 CALC BMI NORM PARAMETERS: HCPCS | Performed by: PHYSICIAN ASSISTANT

## 2021-10-21 PROCEDURE — 3017F COLORECTAL CA SCREEN DOC REV: CPT | Performed by: PHYSICIAN ASSISTANT

## 2021-10-21 PROCEDURE — 1123F ACP DISCUSS/DSCN MKR DOCD: CPT | Performed by: PHYSICIAN ASSISTANT

## 2021-10-21 RX ORDER — METHYLPREDNISOLONE 4 MG/1
4 TABLET ORAL SEE ADMIN INSTRUCTIONS
Qty: 1 KIT | Refills: 0 | Status: SHIPPED | OUTPATIENT
Start: 2021-10-21 | End: 2021-10-27

## 2021-10-21 NOTE — PROGRESS NOTES
[address] [facility], [address] [place], [address] [facility], [address] [facility]           Dept Phone: [phone]           Dept Fax:  [phone]           [place]          Dept Phone: [phone]           Dept Fax:  [phone]      Chief Compliant:  Chief Complaint   Patient presents with    Knee Pain     B/L        History of Present Illness:  Karlie Obregon returns today. This is a 78 y.o. female who presents to the clinic today for follow up of bilateral total knees. Patient's main reason for making this appointment today is acute left knee pain. History of bilateral total knee arthroplasties done approximately 12 years ago. Patient states overall she has done very well with these total knee arthroplasties and states her right knee is doing excellent today unfortunate patient reports a 3-day history of severe left knee pain after a twisting injury. Patient reports she has had intermittent stiffness and pain in his left knee over the last 3 months but this past Sunday she was swinging a hammer and during this motion she twisted this left knee the knee gave out and she had a sudden onset of pain. Associated with significant swelling and pain since the injury. Patient has also had difficulty walking and weightbearing since the injury. She does ambulate into the office today without assistive devices but states it is much more difficult than usual.    She denies any fever, chills, nausea or vomiting since the pain became severe no redness or rash noted.     Patient reports she has a history of tarsal tunnel release on this left side which has altered her gait and may be contributing to that 3-month history of left knee pain she denies any injury prior to the onset of the pain at that time however does note this more recent injury this past weekend. Review of Systems   Constitutional: Negative for fever, chills, sweats, recent illness, or recent injury. Neurological: Negative for headaches, numbness, or weakness. Integumentary: Negative for rash, itching, ecchymosis, abrasions, or laceration. Musculoskeletal: Positive for Knee Pain (B/L)       Physical Exam:  Constitutional: Patient is oriented to person, place, and time. Patient appears well-developed and well nourished. Musculoskeletal:    knee: Bilateral    Skin: warm and dry, no rash or erythema. Left knee is slightly warm to palpation compared to the right but there is no evidence of erythema, rash or injury  Vasculature: 2+ pedal pulses bilaterally  Neuro: Sensation grossly intact to light touch diffusely  Alignment: Normal  Tenderness: Left knee: Moderate tenderness to the medial joint line. Mild tenderness to the patellar tendon. No tenderness to lateral joint line or posterior knee. Right knee: No tenderness    ROM: (Degrees)    Right   A P   Left   A P    Extension  0    Extension  -5 -5  Flexion   120    Flexion   90  95    Crepitation  No    Crepitation  No      Muscle strength:    Right       Left    Flexion   5    Flexion   5  Extension  5    Extension  4  SLR   5    SLR   4    Extensor lag   n    Extensor lag  y      Special testing:    Right          Left    n    Pain with deep knee flexion   y    Right knee stable to varus and valgus stress. Left knee is stable to varus valgus stress but is significantly painful with varus stress. n    Medial joint line tenderness   y  n    Lateral joint line tenderness   n    No calf tenderness no evidence of DVT negative Homans' sign. Neurological: Patient is alert and oriented to person, place, and time. Normal strenght. No sensory deficit. Skin: Skin is warm and dry  Psychiatric: Behavior is normal. Thought content normal.  Nursing note and vitals reviewed. Labs and Imaging:     XR taken today:  No results found. X-rays taken in clinic and preliminarily reviewed by me:  AP bilateral knees standing lateral view of the right knee demonstrates patient is status post bilateral total knee arthroplasty. Right knee with components appear to be in excellent position on both AP and lateral views. Does not appear to be any loosening or instability noted. Some infrapatellar spurring is noted. Left knee status post left total knee arthroplasty. There is slight posterior tilt of the proximal portion of the tibial component. There is no evidence of obvious hardware loosening however. Small effusion present. No evidence of periprosthetic fracture seen. Dr. Dennis Balderrama is available for review of this left knee x-ray does agree that there is slight tilt compared with x-rays in 2013 but there is no evidence of obvious loosening. Is agreeable with plan to proceed with medication management at this time. Assessment and Plan:  1. History of total knee arthroplasty, bilateral    2. Acute pain of left knee              PLAN:  This is a 78 y.o. female who presents to the clinic today for evaluation of acute left knee pain. Patient with history of bilateral total knee arthroplasty approximately 12 years ago. Right knee is doing excellent today. Patient reports she has had approximately 3 months of intermittent left knee pain however did have a twisting injury this past Sunday in the left knee and since then has had severe pain and difficulty weightbearing. Examination today does demonstrate small effusion and pain on exam with limited range of motion but there is no evidence of instability. Radiographically there does appear to be a slight posterior tilt of the tibial component compared to the right knee but no evidence of obvious loosening. 1.  Consideration is given for left knee strain, collateral ligament injury, septic joint and aseptic loosening.   There is no evidence of infectious etiology there does not appear to be any instability with lateral or medial stress. Main concern is given for a strain of the left knee  2. Discussed with patient possible treatment options including medication management versus further diagnostic imaging. Patient would like to proceed with medication first before pursuing any further diagnostic imaging which I believe is an excellent plan. 3.  She is started on a Medrol Dosepak we will see her back in 1 week for reevaluation. 4.  Patient continues to be significantly painful would recommend further diagnostic valuation with a three-phase bone scan to rule out any aseptic loosening in this left knee after this recent injury. 5.  Patient educated on concerning symptoms to be aware of including increased pain, developing fever, chills, erythema of the knee or any other concerning symptoms should any of these arise she should contact our office immediately or be evaluated in the ED. Patient verbalize appropriate understanding. Please note that this chart was generated using voice recognition Dragon dictation software. Although every effort was made to ensure the accuracy of this automated transcription, some errors in transcription may have occurred.

## 2021-10-28 ENCOUNTER — OFFICE VISIT (OUTPATIENT)
Dept: ORTHOPEDIC SURGERY | Age: 66
End: 2021-10-28
Payer: MEDICARE

## 2021-10-28 VITALS — WEIGHT: 125 LBS | RESPIRATION RATE: 14 BRPM | HEIGHT: 61 IN | BODY MASS INDEX: 23.6 KG/M2

## 2021-10-28 DIAGNOSIS — M25.562 ACUTE PAIN OF LEFT KNEE: Primary | ICD-10-CM

## 2021-10-28 DIAGNOSIS — Z96.653 HISTORY OF TOTAL KNEE ARTHROPLASTY, BILATERAL: ICD-10-CM

## 2021-10-28 PROCEDURE — G8420 CALC BMI NORM PARAMETERS: HCPCS | Performed by: PHYSICIAN ASSISTANT

## 2021-10-28 PROCEDURE — 1123F ACP DISCUSS/DSCN MKR DOCD: CPT | Performed by: PHYSICIAN ASSISTANT

## 2021-10-28 PROCEDURE — 99213 OFFICE O/P EST LOW 20 MIN: CPT | Performed by: PHYSICIAN ASSISTANT

## 2021-10-28 PROCEDURE — 3017F COLORECTAL CA SCREEN DOC REV: CPT | Performed by: PHYSICIAN ASSISTANT

## 2021-10-28 PROCEDURE — G8427 DOCREV CUR MEDS BY ELIG CLIN: HCPCS | Performed by: PHYSICIAN ASSISTANT

## 2021-10-28 PROCEDURE — G8399 PT W/DXA RESULTS DOCUMENT: HCPCS | Performed by: PHYSICIAN ASSISTANT

## 2021-10-28 PROCEDURE — 4040F PNEUMOC VAC/ADMIN/RCVD: CPT | Performed by: PHYSICIAN ASSISTANT

## 2021-10-28 PROCEDURE — G8484 FLU IMMUNIZE NO ADMIN: HCPCS | Performed by: PHYSICIAN ASSISTANT

## 2021-10-28 PROCEDURE — 1090F PRES/ABSN URINE INCON ASSESS: CPT | Performed by: PHYSICIAN ASSISTANT

## 2021-10-28 PROCEDURE — 4004F PT TOBACCO SCREEN RCVD TLK: CPT | Performed by: PHYSICIAN ASSISTANT

## 2021-10-28 NOTE — PROGRESS NOTES
[address] [facility], [address] [place], [address] [place] [address], [address] [facility]           Dept Phone: [phone]           Dept Fax:  [phone] [address] [facility], [place]          Dept Phone: [phone]           Dept Fax:  [phone]      Chief Compliant:  Chief Complaint   Patient presents with    Knee Pain     LEFT        History of Present Illness:  Wilber Nelson returns today. This is a 78 y.o. female who presents to the clinic today for 1 week follow up of acute left knee pain, history of left total knee arthroplasty. Patient was initially seen by me on 10/21/2021 after having a twisting knee injury on 10/18/2021. At evaluation with myself patient did have significant pain with range of motion and some tenderness over the medial joint line of this left knee. X-rays at that time demonstrated no evidence of acute abnormality or obvious loosening. She was started on a Medrol Dosepak at that time, fitted for hinged knee brace and instructed to follow-up today for reevaluation. Patient returns today noting that she has had mild improvement of pain. She does note that the knee feels much better when wearing the hinged knee brace however she does occasionally have pain if she moves the knee too much or does too much activity. Unfortunate she has come out of the brace several times and has pain with any activity while not wearing the brace. Patient continues to note swelling albeit significantly improved compared to last week. She denies any joint warmth, redness, fever or chills. Review of Systems   Constitutional: Negative for fever, chills, sweats, recent illness, or recent injury. Neurological: Negative for headaches, numbness, or weakness. Integumentary: Negative for rash, itching, ecchymosis, abrasions, or laceration.    Musculoskeletal: Positive for Knee Pain (LEFT)       Physical Exam:  Constitutional: Patient is oriented to person, place, and time. Patient appears well-developed and well nourished. Musculoskeletal:    Left Knee    Gait:  Antalgic. Does ambulate into the office wearing hinged knee brace. Slightly antalgic gait albeit improved from last exam.   Incision:  Well healed without any incisional erythema. Tenderness: Moderate tenderness over the medial joint line and the pes anserine bursa. No tenderness to the proximal or distal MCL. No tenderness over the lateral joint line, quad or patellar tendon, posterior knee. Flexion ROM:  115   Extension ROM:  -3   Effusion:  mild   DVT Evaluation:  No evidence of DVT seen on physical exam.       Neurological: Patient is alert and oriented to person, place, and time. Normal strenght. No sensory deficit. Skin: Skin is warm and dry  Psychiatric: Behavior is normal. Thought content normal.  Nursing note and vitals reviewed. Labs and Imaging:     XR taken today:  No results found. X-rays from last visit on 10/21/2021 are again reviewed by me demonstrate below findings  AP bilateral knees standing lateral view of the right knee demonstrates patient is status post bilateral total knee arthroplasty. Right knee with components appear to be in excellent position on both AP and lateral views. Does not appear to be any loosening or instability noted. Some infrapatellar spurring is noted.     Left knee status post left total knee arthroplasty. There is slight posterior tilt of the proximal portion of the tibial component. There is no evidence of obvious hardware loosening however. Small effusion present. No evidence of periprosthetic fracture seen.     Dr. Len Patel is available for review of this left knee x-ray does agree that there is slight tilt compared with x-rays in 2013 but there is no evidence of obvious loosening.   Is agreeable with plan to proceed with medication management at this time. Assessment and Plan:  1. Acute pain of left knee    2. History of total knee arthroplasty, bilateral              PLAN:  This is a 78 y.o. female who presents to the clinic today for follow up acute left knee pain after twisting injury on 10/18/2021. History of left total knee arthroplasty done 12 years ago. 1.  On exam today patient does demonstrate moderate improvement of pain but she reports she does continue to have much more pain than she did prior to the incident. 2.  On exam the majority of her pain is over the medial joint line and the pes anserine bursa. It appears patient has a medial hamstring tendon strain but I do question a possible MCL strain as well but there is no evidence of laxity. In the knee is stable to varus valgus stress. 3.  Patient does have evidence radiographically of tibial component tilt compared with x-rays in 2013, however I do not suspect this is acute from her recent injury. .  She is educated that should she continue to be severely painful over next week's time would recommend further diagnostic valuation with a bone scan to rule out any prosthetic loosening. Patient was agreeable to plan at this time. All question concerns were addressed. Please note that this chart was generated using voice recognition Dragon dictation software. Although every effort was made to ensure the accuracy of this automated transcription, some errors in transcription may have occurred.

## 2021-11-17 ENCOUNTER — TELEPHONE (OUTPATIENT)
Dept: ORTHOPEDIC SURGERY | Age: 66
End: 2021-11-17

## 2021-11-17 DIAGNOSIS — Z96.653 HISTORY OF TOTAL KNEE ARTHROPLASTY, BILATERAL: ICD-10-CM

## 2021-11-17 DIAGNOSIS — T84.033D LOOSENING OF PROSTHESIS OF LEFT TOTAL KNEE REPLACEMENT, SUBSEQUENT ENCOUNTER: Primary | ICD-10-CM

## 2021-11-17 NOTE — TELEPHONE ENCOUNTER
Patient called and stated at her appointment on 10/28/2021 Aleksandar Penaloza told her he would order a bone scan if she does not see any improvement with her left knee. She said she has had no improvement and would like the order for a bone scan. Please return her call to [phone]. Thank you.

## 2021-12-02 ENCOUNTER — OFFICE VISIT (OUTPATIENT)
Dept: ORTHOPEDIC SURGERY | Age: 66
End: 2021-12-02
Payer: MEDICARE

## 2021-12-02 VITALS — WEIGHT: 125 LBS | BODY MASS INDEX: 23.6 KG/M2 | HEIGHT: 61 IN

## 2021-12-02 DIAGNOSIS — Z96.653 HISTORY OF TOTAL KNEE ARTHROPLASTY, BILATERAL: Primary | ICD-10-CM

## 2021-12-02 DIAGNOSIS — M25.552 LEFT HIP PAIN: ICD-10-CM

## 2021-12-02 DIAGNOSIS — T84.033D LOOSENING OF PROSTHESIS OF LEFT TOTAL KNEE REPLACEMENT, SUBSEQUENT ENCOUNTER: ICD-10-CM

## 2021-12-02 PROCEDURE — 4040F PNEUMOC VAC/ADMIN/RCVD: CPT | Performed by: PHYSICIAN ASSISTANT

## 2021-12-02 PROCEDURE — 3017F COLORECTAL CA SCREEN DOC REV: CPT | Performed by: PHYSICIAN ASSISTANT

## 2021-12-02 PROCEDURE — 1123F ACP DISCUSS/DSCN MKR DOCD: CPT | Performed by: PHYSICIAN ASSISTANT

## 2021-12-02 PROCEDURE — G8484 FLU IMMUNIZE NO ADMIN: HCPCS | Performed by: PHYSICIAN ASSISTANT

## 2021-12-02 PROCEDURE — G8399 PT W/DXA RESULTS DOCUMENT: HCPCS | Performed by: PHYSICIAN ASSISTANT

## 2021-12-02 PROCEDURE — 1090F PRES/ABSN URINE INCON ASSESS: CPT | Performed by: PHYSICIAN ASSISTANT

## 2021-12-02 PROCEDURE — G8420 CALC BMI NORM PARAMETERS: HCPCS | Performed by: PHYSICIAN ASSISTANT

## 2021-12-02 PROCEDURE — 99214 OFFICE O/P EST MOD 30 MIN: CPT | Performed by: PHYSICIAN ASSISTANT

## 2021-12-02 PROCEDURE — G8427 DOCREV CUR MEDS BY ELIG CLIN: HCPCS | Performed by: PHYSICIAN ASSISTANT

## 2021-12-02 PROCEDURE — 4004F PT TOBACCO SCREEN RCVD TLK: CPT | Performed by: PHYSICIAN ASSISTANT

## 2021-12-02 NOTE — PROGRESS NOTES
knee.    In addition to knee pain patient reports over the last 1 to 2 weeks she has started to develop posterior lateral left hip pain as well. She denies any injury or trauma prior to onset of this. Pain is most significant over the lateral hip aggravated by laying on the side and any prolonged period of walking or standing. Patient denies any numbness or tingling in his left lower extremity. Review of Systems   Constitutional: Negative for fever, chills, sweats, recent illness, or recent injury. Neurological: Negative for headaches, numbness, or weakness. Integumentary: Negative for rash, itching, ecchymosis, abrasions, or laceration. Musculoskeletal: Positive for Knee Pain (left)       Physical Exam:  Constitutional: Patient is oriented to person, place, and time. Patient appears well-developed and well nourished. Musculoskeletal:    Left Knee     Gait:  Antalgic. Does ambulate into the office wearing hinged knee brace. Slightly antalgic gait albeit improved from last exam.   Incision:  Well healed without any incisional erythema. Tenderness: Moderate tenderness over the medial joint line and the pes anserine bursa. No tenderness to the proximal or distal MCL. No tenderness over the lateral joint line, quad or patellar tendon, posterior knee. Flexion ROM:  115   Extension ROM:  -3   Effusion:  mild   DVT Evaluation:  No evidence of DVT seen on physical exam.     left Hip    Tenderness: Moderate tenderness over the left greater trochanter. Mild tenderness over the proximal IT band.   No tenderness to the anterior posterior hip       Range of Motion:      Extension: 10     Flexion: 120     Internal Rotation: 30     External Rotation: 40     Abduction: 40     Adduction:  30       Muscle Strength      Abduction: 5/5     Adduction: 5/5     Flexion: 5/5         Gait: Antalgic   Rudy Test: Negative   Bernie Test: Positive     Mild pain with external rotation but she maintains excellent range of motion no crepitus noted in the hip. Neurological: Patient is alert and oriented to person, place, and time. Normal strenght. No sensory deficit. Skin: Skin is warm and dry  Psychiatric: Behavior is normal. Thought content normal.  Nursing note and vitals reviewed. Labs and Imaging:     XR taken today:  No results found. X-rays taken in clinic and preliminarily reviewed by me:  AP pelvis lateral view of the left hip taken in clinic on 12/2/2021 demonstrates anatomic alignment. Minimal degenerative changes present hips appear age-appropriate. Assessment and Plan:  1. History of total knee arthroplasty, bilateral    2. Loosening of prosthesis of left total knee replacement, subsequent encounter    3. Left hip pain              PLAN:  This is a 78 y.o. female who presents to the clinic today for follow up 6-week history of painful left total knee. Left total knee arthroplasty was initially done approximately 12 years ago. She had an injury on 10/18/2021 that is concerning for possible MCL sprain however radiographs and initial visit with me did demonstrate slight increased tilt of the tibial arthroplastic 1 on the left side compared to previous x-rays 10 years ago. 1.  Unfortunately patient continues to be significantly painful in his left total knee. Most the pain is over the medial joint line patient does have some mild pain with stressing of the MCL but there is no evidence of significant ligamentous laxity. Given patient's continued pain and radiographic changes I recommend further diagnostic evaluation with a three-phase bone scan to rule out loosening component. 2.  Examination of the hip is consistent with trochanteric bursitis likely from favoring this left side due to knee pain. There is no evidence of significant DJD or AVN in the radiographs today. 3.  Continue with hinged knee brace for support of this left knee.   4.  Follow-up after bone scan with Dr. Dmitriy Owens however patient may call return sooner for any questions or concerns. Patient is provided with scheduling information for the bone scan we will contact her with results. Please note that this chart was generated using voice recognition Dragon dictation software. Although every effort was made to ensure the accuracy of this automated transcription, some errors in transcription may have occurred.

## 2021-12-09 NOTE — PROGRESS NOTES
[facility]  New Patient History and Physical    Chief Complaint:   Chief Complaint   Patient presents with    Ankle Pain     left ankle pain for about 3-4 months       HPI: Kip Tuttle is a 78 y.o. female who presents to the office today complaining of left ankle pain. Symptoms began 4 month(s) ago. Patient relates pain is Present. Pain is rated 4 out of 10 and is described as waxing and waning. Treatments prior to today's visit include: none. Feels like something slips out,  massages it and it feels better, sometimes shoots up her leg. No injury. Currently denies F/C/N/V. 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[facility])     Depression     DJD (degenerative joint disease)     DVT (deep venous thrombosis) ([facility]) 2014    Left leg    GERD (gastroesophageal reflux disease)     Hemorrhage     after surgery 3 times due to Delta storage pool platelet disorder    Hepatitis C     H/O after blood transfusion    History of blood transfusion     had transfusions 3 times in the past    Hx of blood clots     Osteoarthritis     knees    Ovarian cyst rupture     H/O, was hemorrhaging, had surgery    Placenta accreta     was bleeding internally, had  and hyst at the same time    PONV (postoperative nausea and vomiting)     Prolapsed bladder     had surgery to repair    Uterine rupture     due to placenta accreta, had hyst with     UTI (lower urinary tract infection)     frequent       Prior to Admission medications    Medication Sig Start Date End Date Taking?  Authorizing Provider   escitalopram (LEXAPRO) 10 MG tablet Take 1 tablet by mouth daily 6/3/21  Yes BELKYS Mead - BETTY   diclofenac sodium (VOLTAREN) 1 % GEL Apply 4 g topically 4 times daily 19  [facility] DPM   NITROFURANTOIN PO Take by mouth as needed    Historical Provider, MD   fluticasone (FLONASE) 50 MCG/ACT nasal spray 1 spray by Nasal route daily  Patient not taking: Reported on 2021   BELKYS Gamino CNP   Cetirizine HCl (ZYRTEC ALLERGY) 10 MG CAPS Take 1 capsule by mouth daily as needed (nasal congestion, cough)  Patient not taking: Reported on 2021   BELKYS Gamino CNP       Past Surgical History:   Procedure Laterality Date    BLADDER SUSPENSION       SECTION  7/15/1979    along with a hyst, removal of 1 ovary    COLONOSCOPY      DILATION AND CURETTAGE OF UTERUS      x 2    HYSTERECTOMY  7/15/1979    uterine rupture due to placenta accreta    JOINT REPLACEMENT      see knee arthroplasty    LIVER BIOPSY      OTHER SURGICAL HISTORY Left 14    tarsal tunnel release, micro debridement tendon, gps, onestep cast    CO RECONSTRUCT PROX HUMERAL IMPLANT Left 10/10/2018    SHOULDER TOTAL ARTHROPLASTY performed by Nigel Mcgee MD at [facility]  [address] Bilateral        Family History   Problem Relation Age of Onset    Diabetes Father     Heart Disease Father         CHF    Heart Attack Brother     Other Brother         depression    Stroke Maternal Grandmother     Heart Attack Maternal Grandfather     Stroke Paternal Grandmother     Heart Attack Paternal Grandfather     Heart Disease Mother         pacemaker       Social History     Tobacco Use    Smoking status: Current Every Day Smoker     Packs/day: 0.50     Years: 30.00     Pack years: 15.00     Types: Cigarettes    Smokeless tobacco: Never Used    Tobacco comment: patient currently trying to quit   Substance Use Topics    Alcohol use:  Yes     Alcohol/week: 0.8 standard drinks     Types: 1 Standard drinks or equivalent per week     Comment: once a week       Review of Systems   Constitutional: Negative for chills, diaphoresis, fatigue, fever and unexpected weight change. Cardiovascular: Negative for leg swelling. Gastrointestinal: Negative for constipation, diarrhea, nausea and vomiting. Musculoskeletal: Positive for gait problem. Negative for arthralgias and joint swelling. Skin: Negative for color change, pallor, rash and wound. Neurological: Negative for weakness and numbness. Lower Extremity Physical Examination:     Vitals: There were no vitals filed for this visit. General: AAO x 3 in NAD. Vascular: DP and PT pulses palpable 2/4, Bilateral.  CFT <3 seconds, Bilateral.  Hair growth absent to the level of the digits, Bilateral.  Edema absent, Bilateral.  Varicosities present, Bilateral. Erythema absent, Bilateral    Neurological:  Sensation present to light touch to level of digits, Bilateral.    Musculoskeletal: Muscle strength 5/5, Left. Pain present upon palpation of sinus tarsi, lateral STJ, Left. decreased medial longitudinal arch, Bilateral.  Ankle ROM normal,Left. 1st MPJ ROM normal, Left. Integument: Warm, dry, supple, Bilateral.  Open lesion absent, Bilateral.      Radiographs: 3 views left Ankle: Three weightbearing views (AP, Mortise, and Lateral) of the left ankle  were obtained in the office today and reviewed, revealing no acute fracture, dislocation, or radioopaque foreign body/tumor. The ankle mortise is maintained with no widening of the clear spaces. Overall alignment is satisfactory. Degenerative changes in the subtalar joint and the midfoot. Asessment: Patient is a 78 y.o. female with:   1. Left ankle pain, unspecified chronicity    2. Sinus tarsitis, left    3. Osteoarthritis of subtalar joint, left          Plan: Patient examined and evaluated. Current condition and treatment options discussed in detail. Advised pt to wear good shoes. Verbal and written instructions given to patient. Contact office with any questions/problems/concerns. Good shoes    Injection Type:   Intermediate Joint/Bursa    Anatomic Location: left sinus tarsi    Verbal consent obtained from patient for injection after all questions answered. Left foot palpated and most tender location identified. This area was prepped with an alcohol swab and 3 cc total of 1cc 0.5% marcaine plain, 1 cc dexamethasone phosphate, and 1 cc depo medrol was injected to the left. A band-aid was applied, patient advised of possible local steroid reaction symptoms, and patient instructed to limit activities to this area for 24 hours. Medications ordered during this encounter were injected into the left. Orders Placed This Encounter   Procedures    XR ANKLE LEFT (MIN 3 VIEWS)    NM ARTHROCENTESIS ASPIR&/INJ INTERM JT/BURS W/O US     Orders Placed This Encounter   Medications    bupivacaine (MARCAINE) 0.5 % injection 5 mg    Dexamethasone Sodium Phosphate injection 6 mg    methylPREDNISolone acetate (DEPO-MEDROL) injection 10 mg        RTC in 4week(s).     9/28/2021 No

## 2021-12-10 ENCOUNTER — HOSPITAL ENCOUNTER (OUTPATIENT)
Dept: NUCLEAR MEDICINE | Age: 66
Discharge: HOME | End: 2021-12-12
Payer: MEDICARE

## 2021-12-10 DIAGNOSIS — Z96.653 HISTORY OF TOTAL KNEE ARTHROPLASTY, BILATERAL: ICD-10-CM

## 2021-12-10 DIAGNOSIS — T84.033D LOOSENING OF PROSTHESIS OF LEFT TOTAL KNEE REPLACEMENT, SUBSEQUENT ENCOUNTER: ICD-10-CM

## 2021-12-10 PROCEDURE — 2580000003 HC RX 258: Performed by: PHYSICIAN ASSISTANT

## 2021-12-10 PROCEDURE — 3430000000 HC RX DIAGNOSTIC RADIOPHARMACEUTICAL: Performed by: PHYSICIAN ASSISTANT

## 2021-12-10 PROCEDURE — A9503 TC99M MEDRONATE: HCPCS | Performed by: PHYSICIAN ASSISTANT

## 2021-12-10 PROCEDURE — 78315 BONE IMAGING 3 PHASE: CPT

## 2021-12-10 RX ORDER — TC 99M MEDRONATE 20 MG/10ML
25 INJECTION, POWDER, LYOPHILIZED, FOR SOLUTION INTRAVENOUS
Status: COMPLETED | OUTPATIENT
Start: 2021-12-10 | End: 2021-12-10

## 2021-12-10 RX ORDER — SODIUM CHLORIDE 0.9 % (FLUSH) 0.9 %
10 SYRINGE (ML) INJECTION PRN
Status: DISCONTINUED | OUTPATIENT
Start: 2021-12-10 | End: 2021-12-13 | Stop reason: HOSPADM

## 2021-12-10 RX ADMIN — TC 99M MEDRONATE 27.3 MILLICURIE: 20 INJECTION, POWDER, LYOPHILIZED, FOR SOLUTION INTRAVENOUS at 07:42

## 2021-12-10 RX ADMIN — SODIUM CHLORIDE, PRESERVATIVE FREE 10 ML: 5 INJECTION INTRAVENOUS at 07:42

## 2021-12-13 ENCOUNTER — TELEPHONE (OUTPATIENT)
Dept: ORTHOPEDIC SURGERY | Age: 66
End: 2021-12-13

## 2021-12-13 NOTE — TELEPHONE ENCOUNTER
I spoke with patient on results, she voiced understanding. I scheduled patient with Dr Annalee Charles to follow up on Bone scan results.

## 2021-12-13 NOTE — TELEPHONE ENCOUNTER
----- Message from Ramon LAO sent at 12/13/2021  8:28 AM EST -----  Bone scan is concerning for possible loosening of the tibial component of the left total knee arthroplasty.   Please have patient follow-up with Dr. Larry Verma to discuss bone scan and future treatment options

## 2021-12-22 ENCOUNTER — OFFICE VISIT (OUTPATIENT)
Dept: ORTHOPEDIC SURGERY | Age: 66
End: 2021-12-22
Payer: MEDICARE

## 2021-12-22 DIAGNOSIS — M25.562 ACUTE PAIN OF LEFT KNEE: Primary | ICD-10-CM

## 2021-12-22 DIAGNOSIS — Z96.652 HISTORY OF TOTAL LEFT KNEE REPLACEMENT: ICD-10-CM

## 2021-12-22 PROCEDURE — G8484 FLU IMMUNIZE NO ADMIN: HCPCS | Performed by: ORTHOPAEDIC SURGERY

## 2021-12-22 PROCEDURE — G8399 PT W/DXA RESULTS DOCUMENT: HCPCS | Performed by: ORTHOPAEDIC SURGERY

## 2021-12-22 PROCEDURE — 99213 OFFICE O/P EST LOW 20 MIN: CPT | Performed by: ORTHOPAEDIC SURGERY

## 2021-12-22 PROCEDURE — G8420 CALC BMI NORM PARAMETERS: HCPCS | Performed by: ORTHOPAEDIC SURGERY

## 2021-12-22 PROCEDURE — 4004F PT TOBACCO SCREEN RCVD TLK: CPT | Performed by: ORTHOPAEDIC SURGERY

## 2021-12-22 PROCEDURE — 3017F COLORECTAL CA SCREEN DOC REV: CPT | Performed by: ORTHOPAEDIC SURGERY

## 2021-12-22 PROCEDURE — G8428 CUR MEDS NOT DOCUMENT: HCPCS | Performed by: ORTHOPAEDIC SURGERY

## 2021-12-22 PROCEDURE — 4040F PNEUMOC VAC/ADMIN/RCVD: CPT | Performed by: ORTHOPAEDIC SURGERY

## 2021-12-22 PROCEDURE — 1123F ACP DISCUSS/DSCN MKR DOCD: CPT | Performed by: ORTHOPAEDIC SURGERY

## 2021-12-22 PROCEDURE — 1090F PRES/ABSN URINE INCON ASSESS: CPT | Performed by: ORTHOPAEDIC SURGERY

## 2021-12-22 NOTE — PROGRESS NOTES
Andra Washington M.D.            [address] [place] [address]., [address] [facility],[address], [address] [facility]           Dept Phone: [phone]           Dept Fax:  [phone]           Nelda Arredondo          Dept Phone: [phone]           Dept Fax:  [phone]      Chief Compliant:  Chief Complaint   Patient presents with    Pain     Lt TKA         History of Present Illness: This is a 78 y.o. female who presents to the clinic today for evaluation / follow up of left knee pain. Patient is a 48-year-old female who is 12 years status post bilateral total knees. Patient had no problems up until just as most recently this past just short of a year. No complaints whatsoever of her right knee. She has been mostly left knee pain. She does relate an incident where she was swinging a heavy hammer and had a twisting motion to her knee. She has been followed by Jayme during the past several months who has been treating her conservatively and has been noticed to have improvement with the brace as well as trochanter injection. The we did actually review x-rays together earlier this fall which did show evidence for loosening of the tibial component with subsidence especially posteriorly as well as anterior motion of the stem tip. Patient just recently had a bone scan performed which does show increased uptake of the tibial component. Review of Systems   Constitutional: Negative for fever, chills, sweats. Eyes: Negative for changes in vision, or pain. HENT: Negative for ear ache, epistaxis, or sore throat. Respiratory/Cardio: Negative for Chest pain, palpitations, SOB, or cough. Gastrointestinal: Negative for abdominal pain, N/V/D. Genitourinary: Negative for dysuria, frequency, urgency, or hematuria. Neurological: Negative for headache, numbness, or weakness. NITROFURANTOIN PO, Take by mouth as needed, Disp: , Rfl:   Allergies   Allergen Reactions    Claritin [Loratadine] Other (See Comments)     headache    Demerol Nausea And Vomiting     Social History     Socioeconomic History    Marital status:      Spouse name: Not on file    Number of children: Not on file    Years of education: Not on file    Highest education level: Not on file   Occupational History    Not on file   Tobacco Use    Smoking status: Current Every Day Smoker     Packs/day: 0.50     Years: 30.00     Pack years: 15.00     Types: Cigarettes    Smokeless tobacco: Never Used    Tobacco comment: patient currently trying to quit   Vaping Use    Vaping Use: Former   Substance and Sexual Activity    Alcohol use: Yes     Alcohol/week: 0.8 standard drinks     Types: 1 Standard drinks or equivalent per week     Comment: once a week    Drug use: No    Sexual activity: Not on file   Other Topics Concern    Not on file   Social History Narrative    Not on file     Social Determinants of Health     Financial Resource Strain: Low Risk     Difficulty of Paying Living Expenses: Not hard at all   Food Insecurity: No Food Insecurity    Worried About [address] in the Last Year: Never true    [facility] in the Last Year: Never true   Transportation Needs:     Lack of Transportation (Medical): Not on file    Lack of Transportation (Non-Medical):  Not on file   Physical Activity:     Days of Exercise per Week: Not on file    Minutes of Exercise per Session: Not on file   Stress:     Feeling of Stress : Not on file   Social Connections:     Frequency of Communication with Friends and Family: Not on file    Frequency of Social Gatherings with Friends and Family: Not on file    Attends Mosque Services: Not on file    Active Member of Clubs or Organizations: Not on file    Attends Club or Organization Meetings: Not on file    Marital Status: Not on file   Intimate Partner  Diabetes Father     Heart Disease Father         CHF    Heart Attack Brother     Other Brother         depression    Stroke Maternal Grandmother     Heart Attack Maternal Grandfather     Stroke Paternal Grandmother     Heart Attack Paternal Grandfather     Heart Disease Mother         pacemaker   Plan  Patient will be scheduled for revision total knee arthroplasty likely tibial component revision with a stemmed component and polyethylene exchange. We will order preoperative ESR and CRP to make sure there were an ideal infectious process which is highly unlikely. She would like to get this scheduled in early January 2022      Provider Attestation:  Joanne Max, personally performed the services described in this documentation. All medical record entries made by the scribe were at my direction and in my presence. I have reviewed the chart and discharge instructions and agree that the records reflect my personal performance and is accurate and complete. Ricarda Mendoza MD. 12/22/21      Please note that this chart was generated using voice recognition Dragon dictation software. Although every effort was made to ensure the accuracy of this automated transcription, some errors in transcription may have occurred.

## 2022-01-03 ENCOUNTER — TELEPHONE (OUTPATIENT)
Dept: INFUSION THERAPY | Age: 67
End: 2022-01-03

## 2022-01-03 NOTE — TELEPHONE ENCOUNTER
Patient called in stating that she has knee replacement scheduled 1/24/2022, calling to see what she needs to have done to be cleared by Dr Cindy Wylie states last time she got PLT transfusion   Pt last seen 10/2018, called and told pt we will review with Dr Reynold Maria when he is in the office next and will let her know pt verbalized understanding   Message left on clipboard   Kenia Wisdom RN

## 2022-01-04 NOTE — TELEPHONE ENCOUNTER
Reviewed with Dr Janelle Mahajan pt will need 1 unit PLT prior to surgery  Called and discussed with pts  Sofia Romano who verbalized understanding   Chele Whelan RN

## 2022-01-05 ENCOUNTER — HOSPITAL ENCOUNTER (OUTPATIENT)
Age: 67
Setting detail: SPECIMEN
Discharge: HOME | End: 2022-01-05

## 2022-01-05 DIAGNOSIS — R39.9 UTI SYMPTOMS: ICD-10-CM

## 2022-01-05 LAB
-: NORMAL
AMORPHOUS: NORMAL
BACTERIA: NORMAL
BILIRUBIN URINE: NEGATIVE
CASTS UA: NORMAL /LPF (ref 0–8)
COLOR: YELLOW
COMMENT UA: ABNORMAL
CRYSTALS, UA: NORMAL /HPF
EPITHELIAL CELLS UA: NORMAL /HPF (ref 0–5)
GLUCOSE URINE: NEGATIVE
KETONES, URINE: NEGATIVE
LEUKOCYTE ESTERASE, URINE: ABNORMAL
MUCUS: NORMAL
NITRITE, URINE: NEGATIVE
OTHER OBSERVATIONS UA: NORMAL
PH UA: 6.5 (ref 5–8)
PROTEIN UA: ABNORMAL
RBC UA: NORMAL /HPF (ref 0–4)
RENAL EPITHELIAL, UA: NORMAL /HPF
SPECIFIC GRAVITY UA: 1.02 (ref 1–1.03)
TRICHOMONAS: NORMAL
TURBIDITY: ABNORMAL
URINE HGB: ABNORMAL
UROBILINOGEN, URINE: NORMAL
WBC UA: NORMAL /HPF (ref 0–5)
YEAST: NORMAL

## 2022-01-07 LAB
CULTURE: ABNORMAL
Lab: ABNORMAL
SPECIMEN DESCRIPTION: ABNORMAL

## 2022-01-10 ENCOUNTER — HOSPITAL ENCOUNTER (OUTPATIENT)
Dept: PREADMISSION TESTING | Age: 67
Discharge: HOME | End: 2022-01-14
Payer: MEDICARE

## 2022-01-10 VITALS
SYSTOLIC BLOOD PRESSURE: 142 MMHG | DIASTOLIC BLOOD PRESSURE: 75 MMHG | WEIGHT: 135 LBS | BODY MASS INDEX: 26.5 KG/M2 | HEART RATE: 64 BPM | RESPIRATION RATE: 16 BRPM | OXYGEN SATURATION: 100 % | HEIGHT: 60 IN | TEMPERATURE: 97.3 F

## 2022-01-10 LAB
ABSOLUTE EOS #: 0.2 K/UL (ref 0–0.4)
ABSOLUTE IMMATURE GRANULOCYTE: NORMAL K/UL (ref 0–0.3)
ABSOLUTE LYMPH #: 3 K/UL (ref 1–4.8)
ABSOLUTE MONO #: 0.6 K/UL (ref 0.1–1.3)
ANION GAP SERPL CALCULATED.3IONS-SCNC: 14 MMOL/L (ref 9–17)
BASOPHILS # BLD: 1 % (ref 0–2)
BASOPHILS ABSOLUTE: 0.1 K/UL (ref 0–0.2)
BILIRUBIN URINE: NEGATIVE
BUN BLDV-MCNC: 14 MG/DL (ref 8–23)
BUN/CREAT BLD: ABNORMAL (ref 9–20)
CALCIUM SERPL-MCNC: 9.6 MG/DL (ref 8.6–10.4)
CHLORIDE BLD-SCNC: 97 MMOL/L (ref 98–107)
CO2: 25 MMOL/L (ref 20–31)
COLOR: YELLOW
COMMENT UA: NORMAL
CREAT SERPL-MCNC: 0.59 MG/DL (ref 0.5–0.9)
DIFFERENTIAL TYPE: NORMAL
EOSINOPHILS RELATIVE PERCENT: 2 % (ref 0–4)
GFR AFRICAN AMERICAN: >60 ML/MIN
GFR NON-AFRICAN AMERICAN: >60 ML/MIN
GFR SERPL CREATININE-BSD FRML MDRD: ABNORMAL ML/MIN/{1.73_M2}
GFR SERPL CREATININE-BSD FRML MDRD: ABNORMAL ML/MIN/{1.73_M2}
GLUCOSE BLD-MCNC: 85 MG/DL (ref 70–99)
GLUCOSE URINE: NEGATIVE
HCT VFR BLD CALC: 44.4 % (ref 36–46)
HEMOGLOBIN: 15.3 G/DL (ref 12–16)
IMMATURE GRANULOCYTES: NORMAL %
KETONES, URINE: NEGATIVE
LEUKOCYTE ESTERASE, URINE: NEGATIVE
LYMPHOCYTES # BLD: 31 % (ref 24–44)
MCH RBC QN AUTO: 32.9 PG (ref 26–34)
MCHC RBC AUTO-ENTMCNC: 34.5 G/DL (ref 31–37)
MCV RBC AUTO: 95.4 FL (ref 80–100)
MONOCYTES # BLD: 6 % (ref 1–7)
NITRITE, URINE: NEGATIVE
NRBC AUTOMATED: NORMAL PER 100 WBC
PDW BLD-RTO: 13.7 % (ref 11.5–14.9)
PH UA: 6 (ref 5–8)
PLATELET # BLD: 267 K/UL (ref 150–450)
PLATELET ESTIMATE: NORMAL
PMV BLD AUTO: 7.3 FL (ref 6–12)
POTASSIUM SERPL-SCNC: 4.5 MMOL/L (ref 3.7–5.3)
PROTEIN UA: NEGATIVE
RBC # BLD: 4.65 M/UL (ref 4–5.2)
RBC # BLD: NORMAL 10*6/UL
SEG NEUTROPHILS: 60 % (ref 36–66)
SEGMENTED NEUTROPHILS ABSOLUTE COUNT: 5.8 K/UL (ref 1.3–9.1)
SODIUM BLD-SCNC: 136 MMOL/L (ref 135–144)
SPECIFIC GRAVITY UA: 1 (ref 1–1.03)
TURBIDITY: CLEAR
URINE HGB: NEGATIVE
UROBILINOGEN, URINE: NORMAL
WBC # BLD: 9.6 K/UL (ref 3.5–11)
WBC # BLD: NORMAL 10*3/UL

## 2022-01-10 PROCEDURE — 93005 ELECTROCARDIOGRAM TRACING: CPT | Performed by: ANESTHESIOLOGY

## 2022-01-10 PROCEDURE — 85025 COMPLETE CBC W/AUTO DIFF WBC: CPT

## 2022-01-10 PROCEDURE — 87641 MR-STAPH DNA AMP PROBE: CPT

## 2022-01-10 PROCEDURE — 36415 COLL VENOUS BLD VENIPUNCTURE: CPT

## 2022-01-10 PROCEDURE — 80048 BASIC METABOLIC PNL TOTAL CA: CPT

## 2022-01-10 PROCEDURE — 81003 URINALYSIS AUTO W/O SCOPE: CPT

## 2022-01-10 RX ORDER — IBUPROFEN 200 MG
200 TABLET ORAL EVERY 6 HOURS PRN
Status: ON HOLD | COMMUNITY
End: 2022-01-24 | Stop reason: HOSPADM

## 2022-01-10 ASSESSMENT — PAIN DESCRIPTION - DESCRIPTORS: DESCRIPTORS: ACHING

## 2022-01-10 ASSESSMENT — PAIN DESCRIPTION - PAIN TYPE: TYPE: ACUTE PAIN

## 2022-01-10 ASSESSMENT — ENCOUNTER SYMPTOMS
WHEEZING: 0
COUGH: 0
SORE THROAT: 0
ABDOMINAL PAIN: 0
SHORTNESS OF BREATH: 0
NAUSEA: 0
DIARRHEA: 0
CONSTIPATION: 0
VOMITING: 0

## 2022-01-10 ASSESSMENT — PAIN DESCRIPTION - LOCATION: LOCATION: KNEE

## 2022-01-10 ASSESSMENT — PAIN DESCRIPTION - ORIENTATION: ORIENTATION: LEFT

## 2022-01-10 ASSESSMENT — PAIN SCALES - GENERAL: PAINLEVEL_OUTOF10: 5

## 2022-01-10 NOTE — H&P (VIEW-ONLY)
HISTORY and [facility] [address]       NAME:  César Lema  MRN: [id number]   YOB: 1955   Date: 1/10/2022   Age: 78 y.o. Gender: female       COMPLAINT AND PRESENT HISTORY:     César Lema is 78 y.o.  female, undergoing preadmission testing for left knee loose hardware with scheduled left knee total arthroplasty revision. Pt s/p bilateral knees arthroplasty approximately 12 years ago. Symptoms in left knee started nearly one year ago. Pt c/o pain, stiffness, popping and cracking in the left knee. Describes pain as constant aching. Rating pain 4-5/10. Takes motrin with moderate relief. Standing, walking and, at times, sitting for prolonged periods aggravates pain. Bracing helps alleviate symptoms. Pain does radiate up anterior aspect of left thigh, into left groin and hip. She does have numbness and tingling to left foot. The left knee does not buckle, give way under the patient. No recent falls, injury or trauma. No redness, swelling or rashes. No Hx of MRSA infections in the past. Has tried injections with minimal relief. Pt c/o burning with urination, pressure sensation that began on 01/05/2022. Pt did call PCP with concerns. UA+ with culture showing e-coli. Pt prescribed Macrobid BID x 7 days by PCP on 01/05/2021. She has been taking antibiotics as prescribed. Denies dysuria. Continues to have urinary frequency. Denies hematuria. Denies fever or chills. UA collected today-pending. NM bone scan completed on 12/10/2021:  Impression   Positive three phase bone scan left knee, most consistent with loosening   tibial component. 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 Sees Dr. Jonathan Napoles for hematology. Pt has history of hemorrhage post operatively. Pt s/p blood transfusions post operatively after ovarian cyst and after child birth.  Pt reports she does have platelet infusion prior to surgery with past surgeries. DVT: Left lower extremity in . Hepatitis C: Has been treated. History of PONV and prolonged emergence from anesthesia. Otherwise, denies personal and family history of complications with anesthesia.        PAST MEDICAL HISTORY     Past Medical History:   Diagnosis Date    Anxiety     Delta storage pool disease [facility])     Depression     DJD (degenerative joint disease)     DVT (deep venous thrombosis) ([facility] [address] 2014    Left leg    GERD (gastroesophageal reflux disease)     Hemorrhage     after surgery 3 times due to Delta storage pool platelet disorder    Hepatitis C     H/O after blood transfusion    History of blood transfusion     had transfusions 3 times in the past    Hx of blood clots     Osteoarthritis     knees    Ovarian cyst rupture     H/O, was hemorrhaging, had surgery    Placenta accreta     was bleeding internally, had  and hyst at the same time    PONV (postoperative nausea and vomiting)     Prolapsed bladder     had surgery to repair    Prolonged emergence from general anesthesia     after total knee surgery    Uterine rupture     due to placenta accreta, had hyst with     UTI (lower urinary tract infection)     frequent       SURGICAL HISTORY       Past Surgical History:   Procedure Laterality Date    BLADDER SUSPENSION      BREAST BIOPSY       SECTION  7/15/1979    along with a hyst, removal of 1 ovary    COLONOSCOPY      DILATION AND CURETTAGE OF UTERUS      x 2    HYSTERECTOMY  7/15/1979    uterine rupture due to placenta accreta    JOINT REPLACEMENT      see knee arthroplasty    LIVER BIOPSY      OTHER SURGICAL HISTORY Left 8 4 14    tarsal tunnel release, micro debridement tendon, gps, onestep cast    NV RECONSTRUCT PROX HUMERAL IMPLANT Left 10/10/2018    SHOULDER TOTAL ARTHROPLASTY performed by Violet Painting MD at [address] [facility] [address] Family History   Problem Relation Age of Onset    Diabetes Father     Heart Disease Father         CHF    Heart Attack Brother     Other Brother         depression    Stroke Maternal Grandmother     Heart Attack Maternal Grandfather     Stroke Paternal Grandmother     Heart Attack Paternal Grandfather     Heart Disease Mother         pacemaker       SOCIAL HISTORY       Social History     Socioeconomic History    Marital status:      Spouse name: None    Number of children: None    Years of education: None    Highest education level: None   Occupational History    None   Tobacco Use    Smoking status: Current Every Day Smoker     Packs/day: 0.50     Years: 30.00     Pack years: 15.00     Types: Cigarettes    Smokeless tobacco: Never Used    Tobacco comment: patient currently trying to quit   Vaping Use    Vaping Use: Former   Substance and Sexual Activity    Alcohol use: Yes     Alcohol/week: 0.8 standard drinks     Types: 1 Standard drinks or equivalent per week     Comment: once a week    Drug use: No    Sexual activity: None   Other Topics Concern    None   Social History Narrative    None     Social Determinants of Health     Financial Resource Strain: Low Risk     Difficulty of Paying Living Expenses: Not hard at all   Food Insecurity: No Food Insecurity    Worried About Running Out of Food in the Last Year: Never true    Stacie of Food in the Last Year: Never true   Transportation Needs:     Lack of Transportation (Medical): Not on file    Lack of Transportation (Non-Medical):  Not on file   Physical Activity:     Days of Exercise per Week: Not on file    Minutes of Exercise per Session: Not on file   Stress:     Feeling of Stress : Not on file   Social Connections:     Frequency of Communication with Friends and Family: Not on file    Frequency of Social Gatherings with Friends and Family: Not on file    Attends Latter-day Services: Not on file   [employer] of Clubs or Organizations: Not on file    Attends Club or Organization Meetings: Not on file    Marital Status: Not on file   Intimate Partner Violence:     Fear of Current or Ex-Partner: Not on file    Emotionally Abused: Not on file    Physically Abused: Not on file    Sexually Abused: Not on file   Housing Stability:     Unable to Pay for Housing in the Last Year: Not on file    Number of [place] in the Last Year: Not on file    Unstable Housing in the Last Year: Not on file        REVIEW OF SYSTEMS      Allergies   Allergen Reactions    Claritin [Loratadine] Other (See Comments)     headache    Demerol Nausea And Vomiting       Current Outpatient Medications on File Prior to Encounter   Medication Sig Dispense Refill    ibuprofen (ADVIL;MOTRIN) 200 MG tablet Take 200 mg by mouth every 6 hours as needed for Pain      nitrofurantoin, macrocrystal-monohydrate, (MACROBID) 100 MG capsule TAKE ONE CAPSULE BY MOUTH DAILY AS NEEDED 30 capsule 4    escitalopram (LEXAPRO) 10 MG tablet TAKE ONE TABLET BY MOUTH DAILY 30 tablet 3    nitrofurantoin, macrocrystal-monohydrate, (MACROBID) 100 MG capsule Take 1 capsule by mouth 2 times daily for 7 days 14 capsule 0    NITROFURANTOIN PO Take by mouth as needed       No current facility-administered medications on file prior to encounter. Review of Systems   Constitutional: Negative for appetite change, chills, fever and unexpected weight change. HENT: Negative for congestion, dental problem, hearing loss and sore throat. Eyes: Positive for visual disturbance (Glasses). Respiratory: Negative for cough, shortness of breath and wheezing. Cardiovascular: Negative for chest pain, palpitations and leg swelling. Gastrointestinal: Negative for abdominal pain, constipation, diarrhea, nausea and vomiting. Genitourinary: Positive for frequency. Negative for dysuria, flank pain, hematuria, pelvic pain and urgency.    Musculoskeletal:        See HPI   Skin: Negative for rash and wound. Neurological: Negative for dizziness, speech difficulty, light-headedness and headaches. Hematological: Bruises/bleeds easily. Psychiatric/Behavioral: Negative. GENERAL PHYSICAL EXAM     Vitals: BP (!) 142/75   Pulse 64   Temp 97.3 °F (36.3 °C) (Infrared)   Resp 16   Ht 5' (1.524 m)   Wt 135 lb (61.2 kg)   SpO2 100%   BMI 26.37 kg/m²  Body mass index is 26.37 kg/m². Physical Exam  Constitutional:       General: She is not in acute distress. Appearance: She is well-developed. She is not ill-appearing, toxic-appearing or diaphoretic. HENT:      Head: Normocephalic and atraumatic. Nose: Nose normal. No congestion. Mouth/Throat:      Mouth: Mucous membranes are moist.      Pharynx: Oropharynx is clear. No oropharyngeal exudate or posterior oropharyngeal erythema. Eyes:      General: No scleral icterus. Right eye: No discharge. Left eye: No discharge. Pupils: Pupils are equal, round, and reactive to light. Neck:      Trachea: No tracheal deviation. Cardiovascular:      Rate and Rhythm: Normal rate and regular rhythm. Heart sounds: Normal heart sounds. No murmur heard. No friction rub. No gallop. Pulmonary:      Effort: Pulmonary effort is normal. No respiratory distress. Breath sounds: Normal breath sounds. No wheezing, rhonchi or rales. Abdominal:      General: Bowel sounds are normal. There is no distension. Palpations: Abdomen is soft. Tenderness: There is no abdominal tenderness. There is no guarding. Musculoskeletal:      Cervical back: Neck supple. No tenderness. Left knee: No effusion, erythema or ecchymosis. Decreased range of motion. Tenderness present over the medial joint line. Right lower leg: No edema. Left lower leg: No edema. Skin:     General: Skin is warm and dry. Coloration: Skin is not jaundiced.       Findings: No bruising, erythema or rash. Neurological:      General: No focal deficit present. Mental Status: She is alert and oriented to person, place, and time. Cranial Nerves: No cranial nerve deficit.       Gait: Gait normal.   Psychiatric:         Mood and Affect: Mood normal.        PROVISIONAL DIAGNOSES / SURGERY:      LEFT KNEE LOOSE HARDWARE     KNEE TOTAL ARTHROPLASTY REVISION  Left    Patient Active Problem List    Diagnosis Date Noted    Chronic kidney disease, stage 5 ([facility] [address] 06/03/2021    S/P shoulder replacement, left 10/10/2018    Osteopenia 06/26/2017    Primary osteoarthritis 06/13/2017    Gall bladder disease 10/24/2016    Left shoulder pain 05/19/2016    Tobacco abuse 05/19/2016    Pure hypercholesterolemia 01/18/2016    Obesity (BMI 30.0-34.9) 11/19/2015    Vitamin D deficiency 07/17/2015    Delta storage pool disease ([facility] [address] 05/02/2014    Lumbar disc disease with radiculopathy 05/02/2014    Leukocytosis 05/02/2014    Chronic hepatitis C without hepatic coma (HCC)     Ovarian cyst rupture     Uterine rupture     Iron deficiency anemia     Anxiety     Depression     GERD (gastroesophageal reflux disease)            Briana Escobedo, BELKYS - CNP on 1/10/2022 at 5:02 PM

## 2022-01-10 NOTE — H&P
HISTORY and [facility]       NAME:  Adelfo Roth  MRN: [id number]   YOB: 1955   Date: 1/10/2022   Age: 78 y.o. Gender: female       COMPLAINT AND PRESENT HISTORY:     Adelfo Roth is 78 y.o.  female, undergoing preadmission testing for left knee loose hardware with scheduled left knee total arthroplasty revision. Pt s/p bilateral knees arthroplasty approximately 12 years ago. Symptoms in left knee started nearly one year ago. Pt c/o pain, stiffness, popping and cracking in the left knee. Describes pain as constant aching. Rating pain 4-5/10. Takes motrin with moderate relief. Standing, walking and, at times, sitting for prolonged periods aggravates pain. Bracing helps alleviate symptoms. Pain does radiate up anterior aspect of left thigh, into left groin and hip. She does have numbness and tingling to left foot. The left knee does not buckle, give way under the patient. No recent falls, injury or trauma. No redness, swelling or rashes. No Hx of MRSA infections in the past. Has tried injections with minimal relief. Pt c/o burning with urination, pressure sensation that began on 01/05/2022. Pt did call PCP with concerns. UA+ with culture showing e-coli. Pt prescribed Macrobid BID x 7 days by PCP on 01/05/2021. She has been taking antibiotics as prescribed. Denies dysuria. Continues to have urinary frequency. Denies hematuria. Denies fever or chills. UA collected today-pending. NM bone scan completed on 12/10/2021:  Impression   Positive three phase bone scan left knee, most consistent with loosening   tibial component. 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 Sees Dr. Pauline Augustin for hematology. Pt has history of hemorrhage post operatively. Pt s/p blood transfusions post operatively after ovarian cyst and after child birth.  Pt reports she does have platelet infusion prior to surgery with past surgeries. DVT: Left lower extremity in . Hepatitis C: Has been treated. History of PONV and prolonged emergence from anesthesia. Otherwise, denies personal and family history of complications with anesthesia.        PAST MEDICAL HISTORY     Past Medical History:   Diagnosis Date    Anxiety     Delta storage pool disease [facility])     Depression     DJD (degenerative joint disease)     DVT (deep venous thrombosis) ([facility] [address] 2014    Left leg    GERD (gastroesophageal reflux disease)     Hemorrhage     after surgery 3 times due to Delta storage pool platelet disorder    Hepatitis C     H/O after blood transfusion    History of blood transfusion     had transfusions 3 times in the past    Hx of blood clots     Osteoarthritis     knees    Ovarian cyst rupture     H/O, was hemorrhaging, had surgery    Placenta accreta     was bleeding internally, had  and hyst at the same time    PONV (postoperative nausea and vomiting)     Prolapsed bladder     had surgery to repair    Prolonged emergence from general anesthesia     after total knee surgery    Uterine rupture     due to placenta accreta, had hyst with     UTI (lower urinary tract infection)     frequent       SURGICAL HISTORY       Past Surgical History:   Procedure Laterality Date    BLADDER SUSPENSION      BREAST BIOPSY       SECTION  7/15/1979    along with a hyst, removal of 1 ovary    COLONOSCOPY      DILATION AND CURETTAGE OF UTERUS      x 2    HYSTERECTOMY  7/15/1979    uterine rupture due to placenta accreta    JOINT REPLACEMENT      see knee arthroplasty    LIVER BIOPSY      OTHER SURGICAL HISTORY Left 8 4 14    tarsal tunnel release, micro debridement tendon, gps, onestep cast    OH RECONSTRUCT PROX HUMERAL IMPLANT Left 10/10/2018    SHOULDER TOTAL ARTHROPLASTY performed by Fletcher Brady MD at [address] [place] [address],[address] Family History   Problem Relation Age of Onset    Diabetes Father     Heart Disease Father         CHF    Heart Attack Brother     Other Brother         depression    Stroke Maternal Grandmother     Heart Attack Maternal Grandfather     Stroke Paternal Grandmother     Heart Attack Paternal Grandfather     Heart Disease Mother         pacemaker       SOCIAL HISTORY       Social History     Socioeconomic History    Marital status:      Spouse name: None    Number of children: None    Years of education: None    Highest education level: None   Occupational History    None   Tobacco Use    Smoking status: Current Every Day Smoker     Packs/day: 0.50     Years: 30.00     Pack years: 15.00     Types: Cigarettes    Smokeless tobacco: Never Used    Tobacco comment: patient currently trying to quit   Vaping Use    Vaping Use: Former   Substance and Sexual Activity    Alcohol use: Yes     Alcohol/week: 0.8 standard drinks     Types: 1 Standard drinks or equivalent per week     Comment: once a week    Drug use: No    Sexual activity: None   Other Topics Concern    None   Social History Narrative    None     Social Determinants of Health     Financial Resource Strain: Low Risk     Difficulty of Paying Living Expenses: Not hard at all   Food Insecurity: No Food Insecurity    Worried About Running Out of Food in the Last Year: Never true    Stacie of Food in the Last Year: Never true   Transportation Needs:     Lack of Transportation (Medical): Not on file    Lack of Transportation (Non-Medical):  Not on file   Physical Activity:     Days of Exercise per Week: Not on file    Minutes of Exercise per Session: Not on file   Stress:     Feeling of Stress : Not on file   Social Connections:     Frequency of Communication with Friends and Family: Not on file    Frequency of Social Gatherings with Friends and Family: Not on file    Attends Anabaptism Services: Not on file   [employer]s or Organizations: Not on file    Attends Club or Organization Meetings: Not on file    Marital Status: Not on file   Intimate Partner Violence:     Fear of Current or Ex-Partner: Not on file    Emotionally Abused: Not on file    Physically Abused: Not on file    Sexually Abused: Not on file   Housing Stability:     Unable to Pay for Housing in the Last Year: Not on file    Number of [place] in the Last Year: Not on file    Unstable Housing in the Last Year: Not on file        REVIEW OF SYSTEMS      Allergies   Allergen Reactions    Claritin [Loratadine] Other (See Comments)     headache    Demerol Nausea And Vomiting       Current Outpatient Medications on File Prior to Encounter   Medication Sig Dispense Refill    ibuprofen (ADVIL;MOTRIN) 200 MG tablet Take 200 mg by mouth every 6 hours as needed for Pain      nitrofurantoin, macrocrystal-monohydrate, (MACROBID) 100 MG capsule TAKE ONE CAPSULE BY MOUTH DAILY AS NEEDED 30 capsule 4    escitalopram (LEXAPRO) 10 MG tablet TAKE ONE TABLET BY MOUTH DAILY 30 tablet 3    nitrofurantoin, macrocrystal-monohydrate, (MACROBID) 100 MG capsule Take 1 capsule by mouth 2 times daily for 7 days 14 capsule 0    NITROFURANTOIN PO Take by mouth as needed       No current facility-administered medications on file prior to encounter. Review of Systems   Constitutional: Negative for appetite change, chills, fever and unexpected weight change. HENT: Negative for congestion, dental problem, hearing loss and sore throat. Eyes: Positive for visual disturbance (Glasses). Respiratory: Negative for cough, shortness of breath and wheezing. Cardiovascular: Negative for chest pain, palpitations and leg swelling. Gastrointestinal: Negative for abdominal pain, constipation, diarrhea, nausea and vomiting. Genitourinary: Positive for frequency. Negative for dysuria, flank pain, hematuria, pelvic pain and urgency.    Musculoskeletal:        See HPI   Skin: Negative for rash and wound. Neurological: Negative for dizziness, speech difficulty, light-headedness and headaches. Hematological: Bruises/bleeds easily. Psychiatric/Behavioral: Negative. GENERAL PHYSICAL EXAM     Vitals: BP (!) 142/75   Pulse 64   Temp 97.3 °F (36.3 °C) (Infrared)   Resp 16   Ht 5' (1.524 m)   Wt 135 lb (61.2 kg)   SpO2 100%   BMI 26.37 kg/m²  Body mass index is 26.37 kg/m². Physical Exam  Constitutional:       General: She is not in acute distress. Appearance: She is well-developed. She is not ill-appearing, toxic-appearing or diaphoretic. HENT:      Head: Normocephalic and atraumatic. Nose: Nose normal. No congestion. Mouth/Throat:      Mouth: Mucous membranes are moist.      Pharynx: Oropharynx is clear. No oropharyngeal exudate or posterior oropharyngeal erythema. Eyes:      General: No scleral icterus. Right eye: No discharge. Left eye: No discharge. Pupils: Pupils are equal, round, and reactive to light. Neck:      Trachea: No tracheal deviation. Cardiovascular:      Rate and Rhythm: Normal rate and regular rhythm. Heart sounds: Normal heart sounds. No murmur heard. No friction rub. No gallop. Pulmonary:      Effort: Pulmonary effort is normal. No respiratory distress. Breath sounds: Normal breath sounds. No wheezing, rhonchi or rales. Abdominal:      General: Bowel sounds are normal. There is no distension. Palpations: Abdomen is soft. Tenderness: There is no abdominal tenderness. There is no guarding. Musculoskeletal:      Cervical back: Neck supple. No tenderness. Left knee: No effusion, erythema or ecchymosis. Decreased range of motion. Tenderness present over the medial joint line. Right lower leg: No edema. Left lower leg: No edema. Skin:     General: Skin is warm and dry. Coloration: Skin is not jaundiced.       Findings: No bruising, erythema or rash. Neurological:      General: No focal deficit present. Mental Status: She is alert and oriented to person, place, and time. Cranial Nerves: No cranial nerve deficit.       Gait: Gait normal.   Psychiatric:         Mood and Affect: Mood normal.        PROVISIONAL DIAGNOSES / SURGERY:      LEFT KNEE LOOSE HARDWARE     KNEE TOTAL ARTHROPLASTY REVISION  Left    Patient Active Problem List    Diagnosis Date Noted    Chronic kidney disease, stage 5 ([facility] [address] 06/03/2021    S/P shoulder replacement, left 10/10/2018    Osteopenia 06/26/2017    Primary osteoarthritis 06/13/2017    Gall bladder disease 10/24/2016    Left shoulder pain 05/19/2016    Tobacco abuse 05/19/2016    Pure hypercholesterolemia 01/18/2016    Obesity (BMI 30.0-34.9) 11/19/2015    Vitamin D deficiency 07/17/2015    Delta storage pool disease ([facility] [address] 05/02/2014    Lumbar disc disease with radiculopathy 05/02/2014    Leukocytosis 05/02/2014    Chronic hepatitis C without hepatic coma (HCC)     Ovarian cyst rupture     Uterine rupture     Iron deficiency anemia     Anxiety     Depression     GERD (gastroesophageal reflux disease)            BELKYS Heath - CNP on 1/10/2022 at 5:02 PM

## 2022-01-10 NOTE — PROGRESS NOTES
Dr. Mari Burkett, anesthesia, was contacted and informed of patient's history and planned surgery. Hem/Onc clearance requested. Surgery scheduling will notify Dr. Yee Renee office who will be responsible for making sure the clearance is obtained and is in the chart for surgery. Patient has contacted her Hem/Onc Dr Jer Oliveira and per telephone encounter he wants her to have 1 unit of platelets prior to surgery, (we will need an order for this). Also notified Dr Mari Burkett that pt currently has a UTI and is on [address] for treatment for this, Per Dr Mari Burkett, ask Dr Yee Renee office what protocol is for total joint and positive UTI as far as when to re-test U/A, informed Dr Mari Burkett that urine specimen was taken today while she is currently on antibiotics.

## 2022-01-11 DIAGNOSIS — D69.1 DELTA STORAGE POOL DISEASE (HCC): Primary | ICD-10-CM

## 2022-01-11 LAB
EKG ATRIAL RATE: 62 BPM
EKG P AXIS: 54 DEGREES
EKG P-R INTERVAL: 152 MS
EKG Q-T INTERVAL: 430 MS
EKG QRS DURATION: 84 MS
EKG QTC CALCULATION (BAZETT): 436 MS
EKG R AXIS: 16 DEGREES
EKG T AXIS: 38 DEGREES
EKG VENTRICULAR RATE: 62 BPM
MRSA, DNA, NASAL: NORMAL
SPECIMEN DESCRIPTION: NORMAL

## 2022-01-11 PROCEDURE — 93010 ELECTROCARDIOGRAM REPORT: CPT | Performed by: INTERNAL MEDICINE

## 2022-01-12 DIAGNOSIS — M25.562 ACUTE PAIN OF LEFT KNEE: Primary | ICD-10-CM

## 2022-01-12 RX ORDER — CIPROFLOXACIN 500 MG/1
500 TABLET, FILM COATED ORAL 2 TIMES DAILY
Qty: 20 TABLET | Refills: 0 | Status: CANCELLED | OUTPATIENT
Start: 2022-01-12 | End: 2022-01-22

## 2022-01-12 NOTE — TELEPHONE ENCOUNTER
Clearance letter with instructions and signature from Dr Edmond Woo faxed to Dr Scout Thorne' office at [phone]. Fax confirmation rec'd.

## 2022-01-12 NOTE — TELEPHONE ENCOUNTER
Spoke with patient about the urinalysis results, she has been taking Bactrim per her PCP. Today is the last day she has to take it. She may f/u with PCP for a repeat urinalysis.

## 2022-01-19 NOTE — PROGRESS NOTES
Writer calls and speaks with blood bank staff Gabbi Espitia regarding procedure for patient to get platelets ordered by Dr. Jonathan Napoles prior to procedure on 1-24-22 with Dr. Mignon Huffman. Gabbi Espitia reports order for [facility] needs to be put in to match platelets and order needs to be corrected for order for platelets to be released day of surgery. Pt needs to come in to get [facility] drawn. Orders placed per this RN. Gabbi Espitia confirms orders placed correctly. Gabbi Espitia reports lab will order platelets to have them ready the morning of 1-24-22. Writer calls patient and leaves message with patients  for patient to get labs done at [facility] by end of day Friday.  verbalizes understanding and will have patient call writer back to confirm.

## 2022-01-20 ENCOUNTER — HOSPITAL ENCOUNTER (OUTPATIENT)
Age: 67
Discharge: HOME | End: 2022-01-20
Payer: MEDICARE

## 2022-01-20 DIAGNOSIS — Z01.818 PREOP TESTING: ICD-10-CM

## 2022-01-20 LAB — ABO/RH: NORMAL

## 2022-01-20 PROCEDURE — 86901 BLOOD TYPING SEROLOGIC RH(D): CPT

## 2022-01-20 PROCEDURE — 36415 COLL VENOUS BLD VENIPUNCTURE: CPT

## 2022-01-20 PROCEDURE — 86900 BLOOD TYPING SEROLOGIC ABO: CPT

## 2022-01-20 NOTE — PROGRESS NOTES
Writer calls [facility] in preop to confirm everything pre op needs is completed for patient transfusion on 1/24 prior to procedure. Simba Way reports concerns with not being able to scan a blood band to initiate transfusion due to patient having only a [address] and not a full type and screen. Writer calls and speaks to Danica in lab and reviews information given to writer by Butch Barnett in lab yesterday. Writer suggests Danica call to pre op and discuss process with Simba Way RN. Danica reports she will contact Simba Way in pre-op.

## 2022-01-21 ENCOUNTER — ANESTHESIA EVENT (OUTPATIENT)
Dept: OPERATING ROOM | Age: 67
DRG: 468 | End: 2022-01-21
Payer: MEDICARE

## 2022-01-24 ENCOUNTER — APPOINTMENT (OUTPATIENT)
Dept: GENERAL RADIOLOGY | Age: 67
DRG: 468 | End: 2022-01-24
Attending: ORTHOPAEDIC SURGERY
Payer: MEDICARE

## 2022-01-24 ENCOUNTER — ANESTHESIA (OUTPATIENT)
Dept: OPERATING ROOM | Age: 67
DRG: 468 | End: 2022-01-24
Payer: MEDICARE

## 2022-01-24 ENCOUNTER — HOSPITAL ENCOUNTER (INPATIENT)
Age: 67
LOS: 1 days | Discharge: HOME HEALTH | DRG: 468 | End: 2022-01-24
Attending: ORTHOPAEDIC SURGERY | Admitting: ORTHOPAEDIC SURGERY
Payer: MEDICARE

## 2022-01-24 VITALS
BODY MASS INDEX: 26.5 KG/M2 | HEIGHT: 60 IN | TEMPERATURE: 98.4 F | HEART RATE: 77 BPM | DIASTOLIC BLOOD PRESSURE: 70 MMHG | RESPIRATION RATE: 18 BRPM | SYSTOLIC BLOOD PRESSURE: 126 MMHG | OXYGEN SATURATION: 93 % | WEIGHT: 135 LBS

## 2022-01-24 VITALS — SYSTOLIC BLOOD PRESSURE: 139 MMHG | TEMPERATURE: 98.6 F | OXYGEN SATURATION: 94 % | DIASTOLIC BLOOD PRESSURE: 74 MMHG

## 2022-01-24 DIAGNOSIS — Z01.818 PREOP TESTING: Primary | ICD-10-CM

## 2022-01-24 DIAGNOSIS — Z01.818 PRE-OP EVALUATION: ICD-10-CM

## 2022-01-24 DIAGNOSIS — T84.018A FAILURE OF TOTAL KNEE ARTHROPLASTY, INITIAL ENCOUNTER (HCC): ICD-10-CM

## 2022-01-24 DIAGNOSIS — Z96.659 FAILURE OF TOTAL KNEE ARTHROPLASTY, INITIAL ENCOUNTER (HCC): ICD-10-CM

## 2022-01-24 PROCEDURE — 97535 SELF CARE MNGMENT TRAINING: CPT

## 2022-01-24 PROCEDURE — 1200000000 HC SEMI PRIVATE

## 2022-01-24 PROCEDURE — 0SPD0JZ REMOVAL OF SYNTHETIC SUBSTITUTE FROM LEFT KNEE JOINT, OPEN APPROACH: ICD-10-PCS | Performed by: ORTHOPAEDIC SURGERY

## 2022-01-24 PROCEDURE — 7100000000 HC PACU RECOVERY - FIRST 15 MIN: Performed by: ORTHOPAEDIC SURGERY

## 2022-01-24 PROCEDURE — 7100000001 HC PACU RECOVERY - ADDTL 15 MIN: Performed by: ORTHOPAEDIC SURGERY

## 2022-01-24 PROCEDURE — 6370000000 HC RX 637 (ALT 250 FOR IP): Performed by: ORTHOPAEDIC SURGERY

## 2022-01-24 PROCEDURE — 97162 PT EVAL MOD COMPLEX 30 MIN: CPT

## 2022-01-24 PROCEDURE — 2580000003 HC RX 258: Performed by: ORTHOPAEDIC SURGERY

## 2022-01-24 PROCEDURE — 3600000013 HC SURGERY LEVEL 3 ADDTL 15MIN: Performed by: ORTHOPAEDIC SURGERY

## 2022-01-24 PROCEDURE — 2500000003 HC RX 250 WO HCPCS: Performed by: NURSE ANESTHETIST, CERTIFIED REGISTERED

## 2022-01-24 PROCEDURE — C1713 ANCHOR/SCREW BN/BN,TIS/BN: HCPCS | Performed by: ORTHOPAEDIC SURGERY

## 2022-01-24 PROCEDURE — 6360000002 HC RX W HCPCS: Performed by: ANESTHESIOLOGY

## 2022-01-24 PROCEDURE — 6360000002 HC RX W HCPCS: Performed by: ORTHOPAEDIC SURGERY

## 2022-01-24 PROCEDURE — 3E0T3BZ INTRODUCTION OF ANESTHETIC AGENT INTO PERIPHERAL NERVES AND PLEXI, PERCUTANEOUS APPROACH: ICD-10-PCS | Performed by: ANESTHESIOLOGY

## 2022-01-24 PROCEDURE — 97116 GAIT TRAINING THERAPY: CPT

## 2022-01-24 PROCEDURE — 3700000001 HC ADD 15 MINUTES (ANESTHESIA): Performed by: ORTHOPAEDIC SURGERY

## 2022-01-24 PROCEDURE — 2720000010 HC SURG SUPPLY STERILE: Performed by: ORTHOPAEDIC SURGERY

## 2022-01-24 PROCEDURE — 97530 THERAPEUTIC ACTIVITIES: CPT

## 2022-01-24 PROCEDURE — P9073 PLATELETS PHERESIS PATH REDU: HCPCS

## 2022-01-24 PROCEDURE — 97166 OT EVAL MOD COMPLEX 45 MIN: CPT

## 2022-01-24 PROCEDURE — 0SRD0J9 REPLACEMENT OF LEFT KNEE JOINT WITH SYNTHETIC SUBSTITUTE, CEMENTED, OPEN APPROACH: ICD-10-PCS | Performed by: ORTHOPAEDIC SURGERY

## 2022-01-24 PROCEDURE — 73560 X-RAY EXAM OF KNEE 1 OR 2: CPT

## 2022-01-24 PROCEDURE — A4216 STERILE WATER/SALINE, 10 ML: HCPCS | Performed by: ORTHOPAEDIC SURGERY

## 2022-01-24 PROCEDURE — 76942 ECHO GUIDE FOR BIOPSY: CPT | Performed by: ANESTHESIOLOGY

## 2022-01-24 PROCEDURE — 2580000003 HC RX 258: Performed by: ANESTHESIOLOGY

## 2022-01-24 PROCEDURE — 88300 SURGICAL PATH GROSS: CPT

## 2022-01-24 PROCEDURE — 97110 THERAPEUTIC EXERCISES: CPT

## 2022-01-24 PROCEDURE — 6360000002 HC RX W HCPCS: Performed by: NURSE ANESTHETIST, CERTIFIED REGISTERED

## 2022-01-24 PROCEDURE — 2500000003 HC RX 250 WO HCPCS: Performed by: ORTHOPAEDIC SURGERY

## 2022-01-24 PROCEDURE — 2709999900 HC NON-CHARGEABLE SUPPLY: Performed by: ORTHOPAEDIC SURGERY

## 2022-01-24 PROCEDURE — C9290 INJ, BUPIVACAINE LIPOSOME: HCPCS | Performed by: ANESTHESIOLOGY

## 2022-01-24 PROCEDURE — 3600000003 HC SURGERY LEVEL 3 BASE: Performed by: ORTHOPAEDIC SURGERY

## 2022-01-24 PROCEDURE — C1776 JOINT DEVICE (IMPLANTABLE): HCPCS | Performed by: ORTHOPAEDIC SURGERY

## 2022-01-24 PROCEDURE — 3700000000 HC ANESTHESIA ATTENDED CARE: Performed by: ORTHOPAEDIC SURGERY

## 2022-01-24 PROCEDURE — 2500000003 HC RX 250 WO HCPCS: Performed by: ANESTHESIOLOGY

## 2022-01-24 DEVICE — CEMENT BNE 40GM W/ GENT HI VISC RADPQ FOR REV SURG: Type: IMPLANTABLE DEVICE | Site: KNEE | Status: FUNCTIONAL

## 2022-01-24 DEVICE — SCREW BNE L60MM DIA3.5MM CORT S STL ST NONCANNULATED LOK: Type: IMPLANTABLE DEVICE | Site: KNEE | Status: FUNCTIONAL

## 2022-01-24 DEVICE — IMPLANTABLE DEVICE: Type: IMPLANTABLE DEVICE | Site: KNEE | Status: FUNCTIONAL

## 2022-01-24 DEVICE — WASHER ORTH DIA7MM FOR CANN SCR: Type: IMPLANTABLE DEVICE | Site: KNEE | Status: FUNCTIONAL

## 2022-01-24 RX ORDER — ACETAMINOPHEN 500 MG
1000 TABLET ORAL ONCE
Status: COMPLETED | OUTPATIENT
Start: 2022-01-24 | End: 2022-01-24

## 2022-01-24 RX ORDER — OXYCODONE HYDROCHLORIDE 10 MG/1
10 TABLET ORAL EVERY 4 HOURS PRN
Status: DISCONTINUED | OUTPATIENT
Start: 2022-01-24 | End: 2022-01-24 | Stop reason: HOSPADM

## 2022-01-24 RX ORDER — PROPOFOL 10 MG/ML
INJECTION, EMULSION INTRAVENOUS PRN
Status: DISCONTINUED | OUTPATIENT
Start: 2022-01-24 | End: 2022-01-24 | Stop reason: SDUPTHER

## 2022-01-24 RX ORDER — SODIUM CHLORIDE 0.9 % (FLUSH) 0.9 %
10 SYRINGE (ML) INJECTION EVERY 12 HOURS SCHEDULED
Status: DISCONTINUED | OUTPATIENT
Start: 2022-01-24 | End: 2022-01-24 | Stop reason: HOSPADM

## 2022-01-24 RX ORDER — DEXAMETHASONE SODIUM PHOSPHATE 10 MG/ML
10 INJECTION, SOLUTION INTRAMUSCULAR; INTRAVENOUS ONCE
Status: COMPLETED | OUTPATIENT
Start: 2022-01-24 | End: 2022-01-24

## 2022-01-24 RX ORDER — TRANEXAMIC ACID 100 MG/ML
INJECTION, SOLUTION INTRAVENOUS PRN
Status: DISCONTINUED | OUTPATIENT
Start: 2022-01-24 | End: 2022-01-24 | Stop reason: SDUPTHER

## 2022-01-24 RX ORDER — OXYCODONE HYDROCHLORIDE 5 MG/1
5 TABLET ORAL EVERY 4 HOURS PRN
Status: DISCONTINUED | OUTPATIENT
Start: 2022-01-24 | End: 2022-01-24 | Stop reason: HOSPADM

## 2022-01-24 RX ORDER — ACETAMINOPHEN 325 MG/1
650 TABLET ORAL EVERY 6 HOURS
Status: DISCONTINUED | OUTPATIENT
Start: 2022-01-24 | End: 2022-01-24 | Stop reason: HOSPADM

## 2022-01-24 RX ORDER — LIDOCAINE HYDROCHLORIDE 20 MG/ML
INJECTION, SOLUTION EPIDURAL; INFILTRATION; INTRACAUDAL; PERINEURAL PRN
Status: DISCONTINUED | OUTPATIENT
Start: 2022-01-24 | End: 2022-01-24 | Stop reason: SDUPTHER

## 2022-01-24 RX ORDER — LIDOCAINE HYDROCHLORIDE 10 MG/ML
1 INJECTION, SOLUTION EPIDURAL; INFILTRATION; INTRACAUDAL; PERINEURAL
Status: DISCONTINUED | OUTPATIENT
Start: 2022-01-24 | End: 2022-01-24 | Stop reason: HOSPADM

## 2022-01-24 RX ORDER — SODIUM CHLORIDE 9 MG/ML
25 INJECTION, SOLUTION INTRAVENOUS PRN
Status: DISCONTINUED | OUTPATIENT
Start: 2022-01-24 | End: 2022-01-24 | Stop reason: HOSPADM

## 2022-01-24 RX ORDER — BUPIVACAINE HYDROCHLORIDE 5 MG/ML
INJECTION, SOLUTION EPIDURAL; INTRACAUDAL
Status: DISCONTINUED | OUTPATIENT
Start: 2022-01-24 | End: 2022-01-24 | Stop reason: SDUPTHER

## 2022-01-24 RX ORDER — PHENYLEPHRINE HYDROCHLORIDE 10 MG/ML
INJECTION INTRAVENOUS PRN
Status: DISCONTINUED | OUTPATIENT
Start: 2022-01-24 | End: 2022-01-24 | Stop reason: SDUPTHER

## 2022-01-24 RX ORDER — CALCIUM CHLORIDE 100 MG/ML
INJECTION INTRAVENOUS; INTRAVENTRICULAR PRN
Status: DISCONTINUED | OUTPATIENT
Start: 2022-01-24 | End: 2022-01-24 | Stop reason: ALTCHOICE

## 2022-01-24 RX ORDER — FENTANYL CITRATE 50 UG/ML
INJECTION, SOLUTION INTRAMUSCULAR; INTRAVENOUS PRN
Status: DISCONTINUED | OUTPATIENT
Start: 2022-01-24 | End: 2022-01-24 | Stop reason: SDUPTHER

## 2022-01-24 RX ORDER — MIDAZOLAM HYDROCHLORIDE 1 MG/ML
INJECTION INTRAMUSCULAR; INTRAVENOUS PRN
Status: DISCONTINUED | OUTPATIENT
Start: 2022-01-24 | End: 2022-01-24 | Stop reason: SDUPTHER

## 2022-01-24 RX ORDER — ASPIRIN 81 MG/1
81 TABLET ORAL 2 TIMES DAILY
Qty: 90 TABLET | Refills: 1 | Status: SHIPPED | OUTPATIENT
Start: 2022-01-24

## 2022-01-24 RX ORDER — ONDANSETRON 2 MG/ML
INJECTION INTRAMUSCULAR; INTRAVENOUS PRN
Status: DISCONTINUED | OUTPATIENT
Start: 2022-01-24 | End: 2022-01-24 | Stop reason: SDUPTHER

## 2022-01-24 RX ORDER — DIPHENHYDRAMINE HYDROCHLORIDE 50 MG/ML
12.5 INJECTION INTRAMUSCULAR; INTRAVENOUS
Status: DISCONTINUED | OUTPATIENT
Start: 2022-01-24 | End: 2022-01-24 | Stop reason: HOSPADM

## 2022-01-24 RX ORDER — GABAPENTIN 600 MG/1
600 TABLET ORAL ONCE
Status: COMPLETED | OUTPATIENT
Start: 2022-01-24 | End: 2022-01-24

## 2022-01-24 RX ORDER — FENTANYL CITRATE 50 UG/ML
25 INJECTION, SOLUTION INTRAMUSCULAR; INTRAVENOUS ONCE
Status: DISCONTINUED | OUTPATIENT
Start: 2022-01-24 | End: 2022-01-24 | Stop reason: HOSPADM

## 2022-01-24 RX ORDER — SODIUM CHLORIDE 0.9 % (FLUSH) 0.9 %
10 SYRINGE (ML) INJECTION PRN
Status: DISCONTINUED | OUTPATIENT
Start: 2022-01-24 | End: 2022-01-24 | Stop reason: HOSPADM

## 2022-01-24 RX ORDER — FENTANYL CITRATE 50 UG/ML
50 INJECTION, SOLUTION INTRAMUSCULAR; INTRAVENOUS ONCE
Status: DISCONTINUED | OUTPATIENT
Start: 2022-01-24 | End: 2022-01-24 | Stop reason: HOSPADM

## 2022-01-24 RX ORDER — GLYCOPYRROLATE 1 MG/5 ML
SYRINGE (ML) INTRAVENOUS PRN
Status: DISCONTINUED | OUTPATIENT
Start: 2022-01-24 | End: 2022-01-24 | Stop reason: SDUPTHER

## 2022-01-24 RX ORDER — ROCURONIUM BROMIDE 10 MG/ML
INJECTION, SOLUTION INTRAVENOUS PRN
Status: DISCONTINUED | OUTPATIENT
Start: 2022-01-24 | End: 2022-01-24 | Stop reason: SDUPTHER

## 2022-01-24 RX ORDER — SCOLOPAMINE TRANSDERMAL SYSTEM 1 MG/1
1 PATCH, EXTENDED RELEASE TRANSDERMAL ONCE
Status: DISCONTINUED | OUTPATIENT
Start: 2022-01-24 | End: 2022-01-24

## 2022-01-24 RX ORDER — LABETALOL HYDROCHLORIDE 5 MG/ML
5 INJECTION, SOLUTION INTRAVENOUS EVERY 10 MIN PRN
Status: DISCONTINUED | OUTPATIENT
Start: 2022-01-24 | End: 2022-01-24 | Stop reason: HOSPADM

## 2022-01-24 RX ORDER — MORPHINE SULFATE 2 MG/ML
2 INJECTION, SOLUTION INTRAMUSCULAR; INTRAVENOUS EVERY 5 MIN PRN
Status: DISCONTINUED | OUTPATIENT
Start: 2022-01-24 | End: 2022-01-24 | Stop reason: HOSPADM

## 2022-01-24 RX ORDER — ONDANSETRON 2 MG/ML
4 INJECTION INTRAMUSCULAR; INTRAVENOUS
Status: DISCONTINUED | OUTPATIENT
Start: 2022-01-24 | End: 2022-01-24 | Stop reason: HOSPADM

## 2022-01-24 RX ORDER — SODIUM CHLORIDE, SODIUM LACTATE, POTASSIUM CHLORIDE, CALCIUM CHLORIDE 600; 310; 30; 20 MG/100ML; MG/100ML; MG/100ML; MG/100ML
INJECTION, SOLUTION INTRAVENOUS CONTINUOUS
Status: DISCONTINUED | OUTPATIENT
Start: 2022-01-24 | End: 2022-01-24

## 2022-01-24 RX ORDER — ONDANSETRON 2 MG/ML
4 INJECTION INTRAMUSCULAR; INTRAVENOUS EVERY 6 HOURS PRN
Status: DISCONTINUED | OUTPATIENT
Start: 2022-01-24 | End: 2022-01-24 | Stop reason: HOSPADM

## 2022-01-24 RX ORDER — MEPERIDINE HYDROCHLORIDE 25 MG/ML
12.5 INJECTION INTRAMUSCULAR; INTRAVENOUS; SUBCUTANEOUS EVERY 5 MIN PRN
Status: DISCONTINUED | OUTPATIENT
Start: 2022-01-24 | End: 2022-01-24 | Stop reason: HOSPADM

## 2022-01-24 RX ORDER — SODIUM CHLORIDE 9 MG/ML
INJECTION, SOLUTION INTRAVENOUS PRN
Status: DISCONTINUED | OUTPATIENT
Start: 2022-01-24 | End: 2022-01-24 | Stop reason: HOSPADM

## 2022-01-24 RX ORDER — OXYCODONE HYDROCHLORIDE AND ACETAMINOPHEN 5; 325 MG/1; MG/1
1-2 TABLET ORAL EVERY 4 HOURS PRN
Qty: 42 TABLET | Refills: 0 | Status: SHIPPED | OUTPATIENT
Start: 2022-01-24 | End: 2022-06-28

## 2022-01-24 RX ORDER — ASPIRIN 81 MG/1
81 TABLET ORAL 2 TIMES DAILY
Status: DISCONTINUED | OUTPATIENT
Start: 2022-01-24 | End: 2022-01-24 | Stop reason: HOSPADM

## 2022-01-24 RX ORDER — SODIUM CHLORIDE, SODIUM LACTATE, POTASSIUM CHLORIDE, CALCIUM CHLORIDE 600; 310; 30; 20 MG/100ML; MG/100ML; MG/100ML; MG/100ML
INJECTION, SOLUTION INTRAVENOUS CONTINUOUS
Status: DISCONTINUED | OUTPATIENT
Start: 2022-01-24 | End: 2022-01-24 | Stop reason: HOSPADM

## 2022-01-24 RX ADMIN — SODIUM CHLORIDE, POTASSIUM CHLORIDE, SODIUM LACTATE AND CALCIUM CHLORIDE: 600; 310; 30; 20 INJECTION, SOLUTION INTRAVENOUS at 11:36

## 2022-01-24 RX ADMIN — TRANEXAMIC ACID 915 MG: 100 INJECTION INTRAVENOUS at 07:46

## 2022-01-24 RX ADMIN — SODIUM CHLORIDE, POTASSIUM CHLORIDE, SODIUM LACTATE AND CALCIUM CHLORIDE: 600; 310; 30; 20 INJECTION, SOLUTION INTRAVENOUS at 15:13

## 2022-01-24 RX ADMIN — FENTANYL CITRATE 50 MCG: 50 INJECTION, SOLUTION INTRAMUSCULAR; INTRAVENOUS at 07:51

## 2022-01-24 RX ADMIN — PHENYLEPHRINE HYDROCHLORIDE 50 MCG: 10 INJECTION INTRAVENOUS at 08:11

## 2022-01-24 RX ADMIN — Medication 0.2 MG: at 08:11

## 2022-01-24 RX ADMIN — FENTANYL CITRATE 50 MCG: 50 INJECTION, SOLUTION INTRAMUSCULAR; INTRAVENOUS at 07:31

## 2022-01-24 RX ADMIN — FENTANYL CITRATE 25 MCG: 50 INJECTION, SOLUTION INTRAMUSCULAR; INTRAVENOUS at 09:02

## 2022-01-24 RX ADMIN — BUPIVACAINE 10 ML: 13.3 INJECTION, SUSPENSION, LIPOSOMAL INFILTRATION at 10:15

## 2022-01-24 RX ADMIN — SODIUM CHLORIDE, POTASSIUM CHLORIDE, SODIUM LACTATE AND CALCIUM CHLORIDE: 600; 310; 30; 20 INJECTION, SOLUTION INTRAVENOUS at 06:43

## 2022-01-24 RX ADMIN — ACETAMINOPHEN 1000 MG: 500 TABLET ORAL at 06:44

## 2022-01-24 RX ADMIN — CEFAZOLIN 2000 MG: 10 INJECTION, POWDER, FOR SOLUTION INTRAVENOUS at 15:15

## 2022-01-24 RX ADMIN — OXYCODONE HYDROCHLORIDE 5 MG: 5 TABLET ORAL at 16:15

## 2022-01-24 RX ADMIN — GABAPENTIN 600 MG: 600 TABLET, FILM COATED ORAL at 06:45

## 2022-01-24 RX ADMIN — CEFAZOLIN 2000 MG: 10 INJECTION, POWDER, FOR SOLUTION INTRAVENOUS at 07:40

## 2022-01-24 RX ADMIN — PHENYLEPHRINE HYDROCHLORIDE 100 MCG: 10 INJECTION INTRAVENOUS at 07:47

## 2022-01-24 RX ADMIN — FENTANYL CITRATE 25 MCG: 50 INJECTION, SOLUTION INTRAMUSCULAR; INTRAVENOUS at 09:08

## 2022-01-24 RX ADMIN — ROCURONIUM BROMIDE 50 MG: 10 INJECTION, SOLUTION INTRAVENOUS at 07:31

## 2022-01-24 RX ADMIN — ACETAMINOPHEN 650 MG: 325 TABLET, FILM COATED ORAL at 11:44

## 2022-01-24 RX ADMIN — ASPIRIN 81 MG: 81 TABLET, COATED ORAL at 11:44

## 2022-01-24 RX ADMIN — ONDANSETRON 4 MG: 2 INJECTION INTRAMUSCULAR; INTRAVENOUS at 08:34

## 2022-01-24 RX ADMIN — LIDOCAINE HYDROCHLORIDE 40 MG: 20 INJECTION, SOLUTION EPIDURAL; INFILTRATION; INTRACAUDAL; PERINEURAL at 07:31

## 2022-01-24 RX ADMIN — OXYCODONE HYDROCHLORIDE 5 MG: 5 TABLET ORAL at 11:45

## 2022-01-24 RX ADMIN — DEXAMETHASONE SODIUM PHOSPHATE 10 MG: 10 INJECTION INTRAMUSCULAR; INTRAVENOUS at 06:45

## 2022-01-24 RX ADMIN — PROPOFOL 150 MG: 10 INJECTION, EMULSION INTRAVENOUS at 07:31

## 2022-01-24 RX ADMIN — MORPHINE SULFATE 2 MG: 2 INJECTION, SOLUTION INTRAMUSCULAR; INTRAVENOUS at 09:49

## 2022-01-24 RX ADMIN — MIDAZOLAM 2 MG: 1 INJECTION INTRAMUSCULAR; INTRAVENOUS at 07:30

## 2022-01-24 RX ADMIN — SUGAMMADEX 200 MG: 100 INJECTION, SOLUTION INTRAVENOUS at 09:24

## 2022-01-24 RX ADMIN — BUPIVACAINE HYDROCHLORIDE 10 ML: 5 INJECTION, SOLUTION EPIDURAL; INTRACAUDAL; PERINEURAL at 10:15

## 2022-01-24 ASSESSMENT — PULMONARY FUNCTION TESTS
PIF_VALUE: 16
PIF_VALUE: 16
PIF_VALUE: 0
PIF_VALUE: 13
PIF_VALUE: 16
PIF_VALUE: 17
PIF_VALUE: 16
PIF_VALUE: 17
PIF_VALUE: 15
PIF_VALUE: 3
PIF_VALUE: 16
PIF_VALUE: 3
PIF_VALUE: 17
PIF_VALUE: 3
PIF_VALUE: 16
PIF_VALUE: 15
PIF_VALUE: 19
PIF_VALUE: 16
PIF_VALUE: 4
PIF_VALUE: 15
PIF_VALUE: 16
PIF_VALUE: 16
PIF_VALUE: 3
PIF_VALUE: 16
PIF_VALUE: 16
PIF_VALUE: 15
PIF_VALUE: 17
PIF_VALUE: 17
PIF_VALUE: 16
PIF_VALUE: 4
PIF_VALUE: 15
PIF_VALUE: 16
PIF_VALUE: 15
PIF_VALUE: 15
PIF_VALUE: 16
PIF_VALUE: 16
PIF_VALUE: 2
PIF_VALUE: 3
PIF_VALUE: 13
PIF_VALUE: 17
PIF_VALUE: 16
PIF_VALUE: 17
PIF_VALUE: 3
PIF_VALUE: 17
PIF_VALUE: 3
PIF_VALUE: 3
PIF_VALUE: 16
PIF_VALUE: 14
PIF_VALUE: 16
PIF_VALUE: 3
PIF_VALUE: 16
PIF_VALUE: 16
PIF_VALUE: 13
PIF_VALUE: 16
PIF_VALUE: 17
PIF_VALUE: 3
PIF_VALUE: 16
PIF_VALUE: 3
PIF_VALUE: 16
PIF_VALUE: 1
PIF_VALUE: 14
PIF_VALUE: 17
PIF_VALUE: 17
PIF_VALUE: 3
PIF_VALUE: 16
PIF_VALUE: 16
PIF_VALUE: 3
PIF_VALUE: 17
PIF_VALUE: 17
PIF_VALUE: 16
PIF_VALUE: 17
PIF_VALUE: 3
PIF_VALUE: 16
PIF_VALUE: 3
PIF_VALUE: 16
PIF_VALUE: 14
PIF_VALUE: 16
PIF_VALUE: 17
PIF_VALUE: 3
PIF_VALUE: 17
PIF_VALUE: 1
PIF_VALUE: 13
PIF_VALUE: 4
PIF_VALUE: 16
PIF_VALUE: 3
PIF_VALUE: 18
PIF_VALUE: 2
PIF_VALUE: 3
PIF_VALUE: 17
PIF_VALUE: 14
PIF_VALUE: 14
PIF_VALUE: 17
PIF_VALUE: 16
PIF_VALUE: 3
PIF_VALUE: 17
PIF_VALUE: 15
PIF_VALUE: 14
PIF_VALUE: 17
PIF_VALUE: 17
PIF_VALUE: 14
PIF_VALUE: 18
PIF_VALUE: 16
PIF_VALUE: 15
PIF_VALUE: 3
PIF_VALUE: 17
PIF_VALUE: 0
PIF_VALUE: 14
PIF_VALUE: 16
PIF_VALUE: 14
PIF_VALUE: 17
PIF_VALUE: 16
PIF_VALUE: 3

## 2022-01-24 ASSESSMENT — ENCOUNTER SYMPTOMS
SHORTNESS OF BREATH: 0
STRIDOR: 0

## 2022-01-24 ASSESSMENT — PAIN - FUNCTIONAL ASSESSMENT: PAIN_FUNCTIONAL_ASSESSMENT: 0-10

## 2022-01-24 ASSESSMENT — LIFESTYLE VARIABLES: SMOKING_STATUS: 0

## 2022-01-24 ASSESSMENT — PAIN DESCRIPTION - PAIN TYPE
TYPE: SURGICAL PAIN

## 2022-01-24 ASSESSMENT — PAIN SCALES - GENERAL
PAINLEVEL_OUTOF10: 5
PAINLEVEL_OUTOF10: 3
PAINLEVEL_OUTOF10: 3
PAINLEVEL_OUTOF10: 5
PAINLEVEL_OUTOF10: 10
PAINLEVEL_OUTOF10: 3
PAINLEVEL_OUTOF10: 5
PAINLEVEL_OUTOF10: 9

## 2022-01-24 ASSESSMENT — PAIN DESCRIPTION - DESCRIPTORS: DESCRIPTORS: ACHING

## 2022-01-24 ASSESSMENT — PAIN DESCRIPTION - LOCATION
LOCATION: KNEE

## 2022-01-24 ASSESSMENT — PAIN DESCRIPTION - ORIENTATION
ORIENTATION: LEFT

## 2022-01-24 ASSESSMENT — PAIN DESCRIPTION - FREQUENCY
FREQUENCY: CONTINUOUS
FREQUENCY: CONTINUOUS

## 2022-01-24 NOTE — ANESTHESIA PROCEDURE NOTES
Peripheral Block    Patient location during procedure: PACU  Start time: 1/24/2022 10:05 AM  End time: 1/24/2022 10:15 AM  Staffing  Performed: anesthesiologist   Anesthesiologist: Meryle Rice, MD  Preanesthetic Checklist  Completed: patient identified, IV checked, site marked, risks and benefits discussed, surgical consent, monitors and equipment checked, pre-op evaluation, timeout performed, anesthesia consent given, oxygen available and patient being monitored  Peripheral Block  Patient position: supine  Prep: ChloraPrep  Patient monitoring: cardiac monitor, continuous pulse ox, frequent blood pressure checks and IV access  Block type: Femoral  Laterality: left  Injection technique: single-shot  Guidance: ultrasound guided  Local infiltration: lidocaine  Infiltration strength: 1 %  Dose: 10 mL  Adductor canal  Provider prep: mask and sterile gloves  Local infiltration: lidocaine  Needle  Needle type: pencil-tip   Needle gauge: 22 G  Needle length: 10 cm  Needle localization: ultrasound guidance  Test dose: negative  Assessment  Injection assessment: negative aspiration for heme, no paresthesia on injection and local visualized surrounding nerve on ultrasound  Paresthesia pain: none  Slow fractionated injection: yes  Hemodynamics: stable  Medications Administered  Bupivacaine (MARCAINE) PF injection 0.5%, 10 mL  bupivacaine liposome (EXPAREL) injection 1.3%, 10 mL  Reason for block: post-op pain management and at surgeon's request

## 2022-01-24 NOTE — PROGRESS NOTES
CLINICAL PHARMACY NOTE: MEDS TO BEDS    Total # of Prescriptions Filled: 1   The following medications were delivered to the patient:  · Percocet 5/325    Additional Documentation:    Delivered medications to patients room

## 2022-01-24 NOTE — ANESTHESIA POSTPROCEDURE EVALUATION
POST- ANESTHESIA EVALUATION       Pt Name: Jt William  MRN: [id number]  YOB: 1955  Date of evaluation: 1/24/2022  Time:  10:58 AM      /63   Pulse 81   Temp 98 °F (36.7 °C) (Infrared)   Resp 18   Ht 5' (1.524 m)   Wt 135 lb (61.2 kg)   SpO2 93%   BMI 26.37 kg/m²      Consciousness Level  Awake  Cardiopulmonary Status  Stable  Pain Adequately Treated YES  Nausea / Vomiting  NO  Adequate Hydration  YES  Anesthesia Related Complications NONE      Electronically signed by Oliver Alatorre MD on 1/24/2022 at 10:58 AM       Department of Anesthesiology  Postprocedure Note    Patient: Jt William  MRN: [id number]  YOB: 1955  Date of evaluation: 1/24/2022  Time:  10:58 AM     Procedure Summary     Date: 01/24/22 Room / Location: [address] [place] [address] Merry [address]apleton 03 / [address] [facility]     Anesthesia Start: 0730 Anesthesia Stop: 5818    Procedure: KNEE TOTAL ARTHROPLASTY REVISION (Left Knee) Diagnosis: (LEFT KNEE LOOSE HARDWARE  (PT HAS HAD COVID VACCINE))    Surgeons: Javan Silver MD Responsible Provider: Oliver Alatorre MD    Anesthesia Type: general, regional ASA Status: 3          Anesthesia Type: general, regional    Vanessa Phase I: Avnessa Score: 9    Vanessa Phase II:      Last vitals: Reviewed and per EMR flowsheets.        Anesthesia Post Evaluation

## 2022-01-24 NOTE — PLAN OF CARE
Problem: Falls - Risk of:  Goal: Will remain free from falls  Description: Will remain free from falls  1/24/2022 1730 by Sylvia Santos RN  Outcome: Completed  1/24/2022 1606 by Sylvia Santos RN  Outcome: Ongoing  Goal: Absence of physical injury  Description: Absence of physical injury  1/24/2022 1730 by Sylvia Santos RN  Outcome: Completed  1/24/2022 1606 by Sylvia Santos RN  Outcome: Ongoing     Problem: Pain:  Goal: Pain level will decrease  Description: Pain level will decrease  1/24/2022 1730 by Sylvia Santos RN  Outcome: Completed  1/24/2022 1606 by Sylvia Santos RN  Outcome: Ongoing  Goal: Control of acute pain  Description: Control of acute pain  1/24/2022 1730 by Sylvia Santos RN  Outcome: Completed  1/24/2022 1606 by Sylvia Santos RN  Outcome: Ongoing  Goal: Control of chronic pain  Description: Control of chronic pain  1/24/2022 1730 by Sylvia Santos RN  Outcome: Completed  1/24/2022 1606 by Sylvia Santos RN  Outcome: Ongoing     Problem: Musculor/Skeletal Functional Status  Goal: Highest potential functional level  Outcome: Completed  Goal: Absence of falls  Outcome: Completed

## 2022-01-24 NOTE — CARE COORDINATION
Joint Replacement Discharge Planning Note:    Admission Date:  1/24/2022 Shelby Child is a 78 y.o.  female    Admitted for : LEFT KNEE LOOSE HARDWARE  (PT HAS HAD [address])    Met with:  Patient    PCP:  BELKYS Dee CNP              Insurance:  Medicare    Current Residence/ Living Arrangements:  independently at home with spouse in 2 story home. Bed and bath are on first floor. Current Services PTA:  No    Is patient agreeable to [email or web address]: Yes    Is patient agreeable to outpatient physical therapy:  eventually    Freedom of choice provided: Yes         [email or web address] Agency/Outpatient Therapy chosen:  Christi By[facility]. Referral sent and notified Nando Montgomerying from [facility] of this. Potential [facility] needed: No    Current home DME:  walker    Pharmacy:  [facility] on [employer] Provider:  Patient and Family                       Discharge Plan:   Patient intends to discharge to Home    Patient does not need a wheeled walker. Anticipated discharge date 1/24/22      Readmission Risk              Risk of Unplanned Readmission:  0           Electronically signed by: Yelena Villanueva RN on 1/24/2022 at 11:04 AM    Per therapy recommendations, adult size walker was 1 inch too tall. Exchanged out for smaller walker. Pt satisfied.     Electronically signed by Yelena Villanueva RN on 1/25/2022 at 7:46 AM

## 2022-01-24 NOTE — DISCHARGE INSTR - COC
Continuity of Care Form    Patient Name: Carly Vicente   :  1955  MRN:  [id number]    Admit date:  2022  Discharge date:  22    Code Status Order: Full Code   Advance Directives:      Admitting Physician:  Ashley Zelaya MD  PCP: Ponciano Shone, APRN - CNP    Discharging Nurse: [facility] Unit/Room#: [address] Unit Phone Number: [phone]    Emergency Contact:   Extended Emergency Contact Information  Primary Emergency Contact: [facility]  Address: [address] [facility], [address] [place] [address] [address] Phone: [phone]  Work Phone: [phone]  Mobile Phone: [phone]  Relation: Spouse  Secondary Emergency Contact: Len Mayberry  Address: [address] [address] [place] [address], [address] [place] [address] [address] Phone: [phone]  Mobile Phone: [phone]  Relation: Child    Past Surgical History:  Past Surgical History:   Procedure Laterality Date    BLADDER SUSPENSION      BREAST BIOPSY       SECTION  7/15/1979    along with a hyst, removal of 1 ovary    COLONOSCOPY      DILATION AND CURETTAGE OF UTERUS      x 2    HYSTERECTOMY  7/15/1979    uterine rupture due to placenta accreta    JOINT REPLACEMENT      see knee arthroplasty    LIVER BIOPSY      OTHER SURGICAL HISTORY Left 8 4 14    tarsal tunnel release, micro debridement tendon, gps, onestep cast    [place] RECONSTRUCT PROX HUMERAL IMPLANT Left 10/10/2018    SHOULDER TOTAL ARTHROPLASTY performed by Peg Tovar MD at [address] [place] [address]        Immunization History:   Immunization History   Administered Date(s) Administered    Influenza Virus Vaccine 2014    Influenza, Quadv, 6 mo and older, IM, PF (Flulaval, Fluarix) 10/11/2018    Influenza, Quadv, IM, PF (6 mo and older Fluzone, Flulaval, Fluarix, and 3 yrs and older Afluria) 2016, 2017    Influenza, Triv, inactivated, subunit, adjuvanted, IM (Fluad 72 yrs and older) 11/20/2019    Pneumococcal Polysaccharide ([id number]) 05/03/2014, 05/12/2020    Tdap (Boostrix, Adacel) 05/12/2020       Active Problems:  Patient Active Problem List   Diagnosis Code    Chronic hepatitis C without hepatic coma (HCC) B18.2    Ovarian cyst rupture N83.209    Uterine rupture S37.69XA    Iron deficiency anemia D50.9    Anxiety F41.9    Depression F32. 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 9) E66.9    Pure hypercholesterolemia E78.00    Left shoulder pain M25.512    Tobacco abuse Z72.0    Gall bladder disease K82.9    Primary osteoarthritis M19.91    Osteopenia M85.80    S/P shoulder replacement, left Z96.612    Chronic kidney disease, stage 5 (HCC) N18.5    [facility] T84.018A, Z96.659       Isolation/Infection:   Isolation            No Isolation          Patient Infection Status       None to display            Nurse Assessment:  Last Vital Signs: /63   Pulse 65   Temp 97.1 °F (36.2 °C) (Infrared)   Resp 18   Ht 5' (1.524 m)   Wt 135 lb (61.2 kg)   SpO2 96%   BMI 26.37 kg/m²     Last documented pain score (0-10 scale): Pain Level: 5  Last Weight:   Wt Readings from Last 1 Encounters:   01/24/22 135 lb (61.2 kg)     Mental Status:  oriented and alert    IV Access:  - None    Nursing Mobility/ADLs:  Walking   Independent  Transfer  Independent  [facility]   Med Delivery   whole    Wound Care Documentation and Therapy:  Incision 10/10/18 Shoulder Left (Active)   Number of days: 1319        Safety Concerns:      At Risk for Falls    Impairments/Disabilities:      None    Nutrition Therapy:  Current Nutrition Therapy:   - Oral Diet: General    Routes of Feeding: Oral  Liquids: No Restrictions  Daily Fluid Restriction: no  Last Modified Barium Swallow with Video (Video Swallowing Test): not done    Treatments at the Time of Hospital Discharge:   Respiratory Treatments: n/a  Oxygen Therapy:  is not on home oxygen therapy. Ventilator:    - No ventilator support    Rehab Therapies: Physical Therapy and Occupational Therapy  Weight Bearing Status/Restrictions: No weight bearing restirctions  Other Medical Equipment (for information only, NOT a DME order):  walker  Other Treatments: skilled nursing assessment  Jerad Aguilar [id number]  This patient is a post-operative total joint replacement patient. Expectations for this patients care are as follows:      Goals:  DailyTherapy for rehabilitative purposes for two weeks. Increased level of activity and ambulation each day. Well-controlled pain. Free from infection. Encourage patient to provide self-care when possible. Ambulation with a rolling walker. Activity & Diet:  Therapy performed daily. (Instruct patient to take pain meds. 1 hour prior to therapy.)  Up in chair for all meals and majority of day. Range of motion for TKA patients. Hip precautions for YUNG patients. Incentive Spirometer: 3- 4 inhalations every twenty minutes, during the day, while awake, the first week home after discharge  Increase oral intake of fruits, fiber and water and take a daily stool softener to prevent constipation. Consider stronger bowel protocol if no bowel movement is achieved after three days home. Increase protein intake and reduce sugar intake to promote healing and prevent infection. No pillow under the knee for TKA patients. [facility] go on in the a.m. and come off in the p.m. (Hand wash them every two or three days, roll in towel to remove most of moisture, and hang to dry.)    Incision Care:   If patient has ACE bandage it may be removed POD #2  Keep incisional dressing intact until seen and removed by surgeon, unless saturated, in which case, call surgeon and request instructions. If dressing falls off, call surgeon. If using the Prevena dressing, with battery pack, place battery pack in waterproof bag during shower. If using Aquacel dressing then dressing is waterproof and patient may shower. If patient has a Prevena remove on POD #5 and replace with Aquacel dressing (patient was provided with dressing in hospital)  Elevated the leg and ice the affected area four times a day, for twenty minutes each time and after every physical therapy session. Normal Conditions - Will improve with provided comfort measures and time:  Some swelling in the operative leg is normal - this should reduce over time. Some post-operative pain is normal.  This will improve with prescribed medication, provided comfort measures and time. Constipation related to pain medications & decreased mobility is a common occurrence. (Increase your fiber & water intake and take a stool softener.)   Slight warmth of operative site is normal and will diminish with time. Fatigue and moderate pain after therapy is normal and will improve with time. Numbness near the incision site is normal and will improve with time. NOTE: Ensure/Remind patient to go to their follow-up appointment with their orthopedic surgeon, which is scheduled:     Abnormal Conditions - When to call the Surgeon:  Increasing/excessive redness, warmth or swelling at the incisional site not relieved with ice and elevation. Increasing/excessive pain not well-controlled by prescribed medications. Drainage or odor from or around the incision site.  (A little blood showing through the bandage is ok, but active leaking, of any color, coming out of the bandage is NOT normal.)  Temperature above 101 degrees.   (A mild temp of 99 - 100 is normal - if temp gets to 101 you may use Tylenol once - if it does not improve, you may use a 2nd dose of Tylenol after 5 hours - if temperature is still at or above 101 degrees then call the surgeon.)  Calf tenderness, swelling, or redness or numbness of the foot/lower leg. Shortness of breath or chest pain. Any other incision or surgical-related concerns. CALL SURGEON with concerns PRIOR TO sending patient to hospital.      Patient's personal belongings (please select all that are sent with patient):  None    RN SIGNATURE:  Electronically signed by Lauro Alegre RN on 1/24/22 at 11:03 AM EST    CASE MANAGEMENT/SOCIAL WORK SECTION    Inpatient Status Date:     Readmission Risk Assessment Score:  Readmission Risk              Risk of Unplanned Readmission:  0           Discharging to Facility/ [address]  P: [phone]  F: [phone]      / signature: Electronically signed by Lauro Alegre RN on 1/24/22 at 11:03 AM EST    PHYSICIAN SECTION    Prognosis: Good    Condition at Discharge: Stable    Rehab Potential (if transferring to Rehab): Good    Recommended Labs or Other Treatments After Discharge: see above    Physician Certification: I certify the above information and transfer of Mary Hutchins  is necessary for the continuing treatment of the diagnosis listed and that she requires [address] for less 30 days.      Update Admission H&P: No change in H&P    PHYSICIAN SIGNATURE:  Electronically signed by Shelby Warren MD on 1/24/22 at 7:23 AM EST

## 2022-01-24 NOTE — INTERVAL H&P NOTE
Update History & Physical    The patient's History and Physical of January 10, 2022  was reviewed with the patient and I examined the patient. There was no change, she has complete antibiotics and denies any urinary sxs. . The surgical site was confirmed by the patient and me. Patient will be having:  KNEE TOTAL ARTHROPLASTY REVISION - Left. Patient reports pain of 4/10 this am/   Patient has been NPO since midnight. No blood thinners in the past 5-7 days. Patient denies any personal or family problems with anesthesia.        Electronically signed by BELKYS Wen CNP on 1/24/2022 at 5:53 AM

## 2022-01-24 NOTE — OP NOTE
Operative Note      Patient: Suha Guillen  YOB: 1955  MRN: [id number]    Date of Procedure: 1/24/2022    Pre-Op Diagnosis: LEFT KNEE LOOSE HARDWARE  (PT HAS HAD COVID VACCINE)    Post-Op Diagnosis: Same, aseptic loosening tibial component       Procedure(s):  KNEE TOTAL ARTHROPLASTY REVISION left    Surgeon(s):  Richmond Hdz MD    Assistant:   Resident: DO Orlin Broussard CST  Anesthesia: General    Estimated Blood Loss (mL): Minimal    Complications: None    Specimens:   ID Type Source Tests Collected by Time Destination   A : LEFT KNEE EXPLANTS Hardware Joint, Knee SURGICAL PATHOLOGY Richmond Hdz MD 1/24/2022 8535        Implants:  Implant Name Type Inv.  Item Serial No.  Lot No. LRB No. Used Action   CEMENT BNE 40GM W/ GENT HI VISC RADPQ FOR REV SURG  CEMENT BNE 40GM W/ GENT HI VISC RADPQ FOR REV SURG  [employer]- [id number] Left 1 Implanted   SCREW BNE L60MM DIA3.5MM NIKKI S STL ST NONCANNULATED KADEN  SCREW BNE L60MM DIA3.5MM NIKKI S STL ST NONCANNULATED KADEN  [employer]- N/A Left 2 Implanted   WASHER ORTH DIA7MM FOR ELADIO SCR  WASHER ORTH DIA7MM FOR ELADIO SCR  DEPU[employer]-WD N/A Left 1 Implanted   EXTENSION STEM L80MM [id number] KNEE STR SPLINED CEMENTLESS  EXTENSION STEM L80MM [id number] KNEE STR SPLINED CEMENTLESS  [employer]- [id number] Left 1 Implanted   TRAY TIB L63MM KNEE INTLOK MOD STEM  TRAY TIB L63MM KNEE INTLOK MOD STEM  [employer]- [id number] Left 1 Implanted   BLOCK TIB AUG L63MM THK6MM KNEE TI ALLY CARINE VANGUARD  BLOCK TIB AUG L63MM THK6MM KNEE TI ALLY CARINE VANGUARD  [employer]- [id number] Left 1 Implanted   BLOCK TIB AUG L63MM THK6MM KNEE TI ALLY CARINE VANGUARD  BLOCK TIB AUG L63MM THK6MM KNEE TI ALLY CARINE VANGUARD  [employer]- [id number] Left 1 Implanted   VNGD ANT STAB BRG [id number]  VNGD ANT STAB BRG 13X63  [facility] [id number] Left 1 Implanted         Drains: * No LDAs found *    Findings: Significant polyethylene debris debris most likely backside wear from aseptic loosening component. Detailed Description of Procedure:   Patient is a 79-year-old female who is 13 years status post a left total knee arthroplasty. Patient been having increasing knee pain recently. She had evidence radiographically for possible aseptic loosening of her tibial component with the subsidence and change in position. A preoperative sed rate CRP were normal.  Patient did have a bone scan which showed significant uptake at the tibial component. Is felt the patient would benefit from revision partial arthroplasty consent was obtained with good comprehension of all risk and benefits    Patient was given 2 g Ancef in the holding orotate operative suite where general anesthesia was administered. Tourniquet applied left thigh to left lower extremity and then prepped and draped in usual sterile fashion. Leg was exsanguinated and tourniquet inflated to 300 mils mercury. Timeout was called to verify laterality. Previous surgical incision was utilized and extended proximally distally. Medial parapatellar arthrotomy was carried out in routine fashion. Significant polyethylene debris around the femoral and tibial components but as well as the suprapatellar area identified and these were dissected carefully and removed with a with a form of synovectomy. The peripatellar area was also likewise cleared. This was found to be stable. The femoral component was found to be stable. The polyethylene was removed there was no articular wear but there was backside wear is a clip could be easily removed. The tibial component was grossly loose and could be lifted off by hand. Bone cement was identified patient was noted to have a defect within the medial tibial plateau extending posteriorly. The excess cement removed.   The extra medullary tibial cutting guide was in position referenced for just a minimal cut of the make a flat surface. The lollipop there was size 63 was then placed to have the best overall fit this was locked to the intramedullary  which had been reamed starting with a finder and then a 10 mm and 12mm . The notch device was then placed over this and the anterior posterior notch maker was impacted next and this allowed compression. We able to take some of this bone and placement into the bony defect. 2 peripheral 3.5 millimeter screws with washer also placed and to enhance fixation of the tibia medially. It is a size 63 baseplate with 6 mm augments was placed medial to lateral with size 12 stem was had excellent fit and trial with a 12-14 and excellent patellar tracking and stability satisfied with this all the trial components were removed. The tibial component was assembled with the 6 mm augments the 12 m x 80 stem. Antibiotic impregnated cement was utilized and placed it within the defect as well as on the implant in the bossing this was impacted and excess cement removed and compression a 14 poly-. Knee was copiously irrigated with irrisept and then injected with 100 cc with total joint solution. After hardening of cement was verified investigated thoroughly for any further soft tissue or cement debris which was removed. Upon my retrialing letter go over the permanent 13 mm anterior stabilized polyethylene which was placed and locked. Knee was then sprayed with platelet rich platelet poor plasma spray. The arthrotomy was closed with dual #1 strata fix. The tourniquet deflated at this time of 77 minutes. Hemostasis was excellent. The subcu was closed with 2-0 Monocryl strata fix and zip line for the skin covered with Aquacel dressing and Ace bandage.   The patient was awakened taken postanesthesia care unit in good condition    Electronically signed by Arielle Robledo MD on 1/24/2022 at 9:14 AM

## 2022-01-24 NOTE — PROGRESS NOTES
Patient discharged at this time with discharge instructions and personal belongings in stable condition via wheelchair with . IV removed with no complications. All questions answered.

## 2022-01-24 NOTE — PROGRESS NOTES
[address]   Occupational Therapy Evaluation  Date: 22  Patient Name: Stu Moreno       Room: 2896/9070-07  MRN: [id number]  Account: [de-identified]   : 1955  (77 y.o.) Gender: female     Discharge Recommendations: The patient may need non-skilled ADL assistance after discharge. 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 Past Surgical History:   has a past surgical history that includes Tonsillectomy; Dilation and curettage of uterus; liver biopsy; bladder suspension; Total knee arthroplasty (Bilateral); joint replacement;  section (7/15/1979); Hysterectomy (7/15/1979); Colonoscopy; other surgical history (Left, 14); pr reconstruct prox humeral implant (Left, 10/10/2018); Breast biopsy; and Revision total knee arthroplasty (Left, 2022).     Restrictions  Restrictions/Precautions: Weight Bearing,Surgical Protocols,Up as Tolerated  Implants present? : Metal implants (B TKA; L TSA)  Other position/activity restrictions: KNEE TOTAL ARTHROPLASTY REVISION left 22  Left Lower Extremity Weight Bearing: Weight Bearing As Tolerated (FWBAT)  Required Braces or Orthoses?: No     Vitals  Temp: 98.4 °F (36.9 °C)  Pulse: 77  Resp: 18  BP: 126/70  Height: 5' (152.4 cm)  Weight: 135 lb (61.2 kg)  BMI (Calculated): 26.4  Oxygen Therapy  SpO2: 93 %  Pulse Oximeter Device Mode: Continuous  O2 Device: None (Room air)  O2 Flow Rate (L/min): 0 L/min  Level of Consciousness: Alert (0)    Subjective  Subjective: Pt resting in bed with  in room upon arrival. Pt was pleasant and agreeable to OT/PT eval  Comments: ok per RN Jim Beckford for OT/PT eval  Overall Orientation Status: Within Functional Limits  Vision  Vision: Impaired  Vision Exceptions: Wears glasses for distance  Hearing  Hearing: Within functional limits  Social/Functional History  Lives With: Spouse  Type of Home: House  Home Layout: Two level,Able to Live on Main level with bedroom/bathroom  Home Access: Stairs to enter with rails,Stairs to enter without rails  Entrance Stairs - Number of Steps: 1 FILIPPO holds onto door frame  Bathroom Shower/Tub: Walk-in shower,Doors  [place] Toilet: Handicap height  Bathroom Equipment: Grab bars in shower,Built-in shower seat,Hand-held shower (Wall close to toilet)  Bathroom Accessibility: Walker accessible  Home Equipment: Standard walker,Rolling walker  ADL Assistance: Independent  Homemaking Assistance: Independent  Homemaking Responsibilities: Yes  Ambulation Assistance: Independent  Transfer Assistance: Independent  Active : Yes  Mode of Transportation: [facility]  IADL Comments: Pt reports sleeping on the couch  Additional Comments:  reports that he is retired and is able to assist as needed.    Pain Assessment  Pain Assessment: 0-10  Pain Level: 3  Pain Type: Surgical pain  Pain Location: Knee  Pain Orientation: Left    Objective      Cognition  Overall Cognitive Status: WFL   Perception  Overall Perceptual Status: WFL  Sensation  Overall Sensation Status: Impaired (Numbness in left foot)   ADL  Feeding: Independent  Grooming: Setup  UE Bathing: Stand by assistance  LE Bathing: Contact guard assistance  UE Dressing: Stand by assistance  LE Dressing: Contact guard assistance  Toileting: Contact guard assistance  Additional Comments: ADL scores based on clinical reasoning and skilled observation unless otherwise noted. OT facilitated pts engagement in dressing and toileting task on this date. Pt educated on adaptive dressing donning surgical side first with good return demo. Pt able to don bilateral socks while sitting EOB. Pt completed toileting task with CGA-SBA for safety. UE Function           LUE Strength  Gross LUE Strength: WFL  L Hand General: 4/5     LUE Tone: Normotonic     LUE AROM (degrees)  LUE AROM : WFL     Left Hand AROM (degrees)  Left Hand AROM: WFL  RUE Strength  Gross RUE Strength: WFL  R Hand General: 4/5      RUE Tone: Normotonic     RUE AROM (degrees)  RUE AROM : WFL     Right Hand AROM (degrees)  Right Hand AROM: WFL    Fine Motor Skills  Coordination  Movements Are Fluid And Coordinated: Yes                           Mobility  Supine to Sit: Stand by assistance       Balance  Sitting Balance: Stand by assistance  Standing Balance: Contact guard assistance (CGA-SBA)  Standing Balance  Time: 1-2 minutes x 2; 4-5 minutes x 1  Activity: functional transfers/mobility, lower body dressing, toileting  Comment: with RW  Functional Mobility  Functional - Mobility Device: Rolling Walker  Activity: To/from bathroom,Other (hallway)  Assist Level: Contact guard assistance  Functional Mobility Comments: Verbal cues for hand placement and safety. Progressed to SBA with RW  Bed mobility  Supine to Sit: Stand by assistance  Scooting: Stand by assistance  Comment: Bed mobility completed with HOB elevated     Transfers  Sit to stand: Contact guard assistance  Stand to sit: Contact guard assistance  Transfer Comments: Verbal cues for hand placement and safety CGA-SBA  Toilet Transfers  Toilet - Technique: Ambulating  Equipment Used: Raised toilet seat with rails  Toilet Transfer: Contact guard assistance (CGA-SBA)  Toilet Transfers Comments: Verbal cues for hand placement and safety   Functional Activity Tolerance  Functional Activity Tolerance:  Tolerates 30 min exercise with multiple rests     Assessment  Assessment  Performance deficits / Impairments: Decreased ADL status,Decreased functional mobility ,Decreased strength,Decreased endurance,Decreased balance,Decreased high-level IADLs  Treatment Diagnosis: impaired self care status  Prognosis: Good  Decision Making: Medium Complexity  REQUIRES OT FOLLOW UP: Yes  Activity Tolerance: Patient Tolerated treatment well         Functional Outcome Measures  AM-PAC Daily Activity Inpatient   How much help for putting on and taking off regular lower body clothing?: A Little  How much help for Bathing?: A Little  How much help for Toileting?: A Little  How much help for putting on and taking off regular upper body clothing?: A Little  How much help for taking care of personal grooming?: A Little  How much help for eating meals?: None  AM-[facility] Inpatient Daily Activity Raw Score: 19  AM-PAC Inpatient ADL T-Scale Score : 40.22  ADL Inpatient CMS 0-100% Score: 42.8  ADL Inpatient CMS G-Code Modifier : CK       Goals  Patient Goals   Patient goals : To go home  Short term goals  Time Frame for Short term goals: By discharge  Short term goal 1: Pt will complete BADLs with modified independence and good safety  Short term goal 2: Pt will complete functional transfers/mobility during self care tasks with modified independence with self care tasks  Short term goal 3: Pt adam verbalize/demonstrate good understanding of home safety/fall prevention strategies to increase safety and independence with self care and mobility    Plan  Safety Devices  Safety Devices in place: Yes  Type of devices:  All fall risk precautions in place,Call light within reach,Gait belt,Patient at risk for falls,Left in chair,Nurse notified     Plan  Times per week: 5-7 (1-2 times per day)  Current Treatment Recommendations: Self-Care / Panda Vázquez Education & Training,Patient/Caregiver Education & Training,Equipment Evaluation, Education, & procurement,Home Management Training          OT Individual Minutes  Time In: 7177  Time Out: 6601  Minutes: 55    Electronically signed by Ryan Mcgrath OT on 1/24/22 at 3:18 PM EST

## 2022-01-24 NOTE — PROGRESS NOTES
Physical Therapy    Facility/Department: [facility] MED SURG  Initial Assessment    NAME: Mary Hutchins  : 1955  MRN: [id number]    Date of Service: 2022    Discharge Recommendations:  Patient would benefit from continued therapy after discharge   PT Equipment Recommendations  Other: Rolling walker. DME company deleivered RW for pt, which is a adult size. pt needs youth rolling walker, discharge planner notifed to see if [employer] company can exchange and aprovide appropriate rolling walker to pt. Assessment   Assessment: Pt demonstrates safe transfers/ambulation with Rolling walker as well as going up and steps. Recommend conitnued therapy at discharge, Pt/spouse educated in importance of using cold packs for 20 to 30 minutes at a time and couple times/day initially, and than atleast after ex's/activity. Treatment Diagnosis: Impaired fucntion  Prognosis: Good  Decision Making: Medium Complexity  PT Education: PT Role;Goals; General Safety;Gait Training;Plan of Care; Functional Mobility Training;Home Exercise Program;Precautions;Transfer Training;Weight-bearing Education;Equipment  REQUIRES PT FOLLOW UP:  (Pt being DC with conintued therapy at [place])  Activity Tolerance  Activity Tolerance: Patient Tolerated treatment well       Patient Diagnosis(es): The primary encounter diagnosis was Preop testing. Diagnoses of Pre-op evaluation and Failure of total knee arthroplasty, initial encounter [facility]) were also pertinent to this visit. 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 has a past surgical history that includes Tonsillectomy; Dilation and curettage of uterus; liver biopsy; bladder suspension; Total knee arthroplasty (Bilateral); joint replacement;  section (7/15/1979); Hysterectomy (7/15/1979); Colonoscopy; other surgical history (Left,  14); pr reconstruct prox humeral implant (Left, 10/10/2018); Breast biopsy; and Revision total knee arthroplasty (Left, 2022). Restrictions  Restrictions/Precautions  Restrictions/Precautions: Weight Bearing,Surgical Protocols,Up as Tolerated  Required Braces or Orthoses?: No  Implants present? : Metal implants (B TKA; L TSA)  Lower Extremity Weight Bearing Restrictions  Left Lower Extremity Weight Bearing: Weight Bearing As Tolerated (FWBAT)  Position Activity Restriction  Other position/activity restrictions: KNEE TOTAL ARTHROPLASTY REVISION left 22  Vision/Hearing  Vision: Impaired  Vision Exceptions: Wears glasses for distance  Hearing: Within functional limits     Subjective  General  Patient assessed for rehabilitation services?: Yes  Additional Pertinent Hx: KNEE TOTAL ARTHROPLASTY REVISION left 22 by Dr Tejinder Adams  Referring Practitioner: Dr Tejinder Adams  Referral Date : 22  Diagnosis: Tonyberg left 22  Follows Commands: Within Functional Limits  Pain Screening  Patient Currently in Pain: Yes  Pain Assessment  Pain Assessment: 0-10  Pain Level: 3  Pain Type: Surgical pain  Pain Location: Knee  Pain Orientation: Left  Non-Pharmaceutical Pain Intervention(s): Cold applied; Ambulation/Increased Activity  Vital Signs  BP Location: Left upper arm  Level of Consciousness: Alert (0)  Patient Currently in Pain: Yes  Oxygen Therapy  O2 Device: None (Room air)  Patient Observation  Observations: Time out for Lt. knee block done @ 1005.        Orientation     Social/Functional History  Social/Functional History  Lives With: Spouse  Type of Home: House  Home Layout: Two level,Able to Live on Main level with bedroom/bathroom  Home Access: Stairs to enter with rails,Stairs to enter without rails  Entrance Stairs - Number of Steps: 1 FILIPPO holds onto door frame  Bathroom Shower/Tub: Walk-in shower,Doors  Bathroom Toilet: Handicap height  Bathroom Equipment: Grab bars in shower,Built-in shower seat,Hand-held shower (Wall close to toilet)  Bathroom Accessibility: Walker accessible  Home Equipment: Standard walker,Rolling walker  ADL Assistance: Independent  Homemaking Assistance: Independent  Homemaking Responsibilities: Yes  Ambulation Assistance: Independent  Transfer Assistance: Independent  Active : Yes  Mode of Transportation: SUV  IADL Comments: Pt reports sleeping on the couch  Additional Comments:  reports that he is retired and is able to assist as needed.    Cognition        Objective          AROM RLE (degrees)  RLE AROM: WFL  AROM LLE (degrees)  LLE General AROM: AAROM L Knee 10 to 83 degrees  Strength RLE  Strength RLE: WNL  Strength LLE  Comment: Grossly 3/5 at knee     Sensation  Overall Sensation Status: Impaired (Numbness in left foot)  Bed mobility  Supine to Sit: Stand by assistance  Sit to Supine: Stand by assistance  Scooting: Stand by assistance  Transfers  Sit to Stand: Contact guard assistance;Stand by assistance (CGA progress to SBA)  Stand to sit: Stand by assistance  Bed to Chair: Stand by assistance;Contact guard assistance (CAG, progressed to SBA -used Rolling walker)  Ambulation  Ambulation?: Yes  WB Status: WBAT L LE  Ambulation 1  Surface: level tile  Device: Rolling Walker  Assistance: Stand by assistance;Contact guard assistance  Quality of Gait: Pt starts at CGA and step to pattern, but hayden to improve ree, step length, step through pattern and progress to SBA  Distance: 150 ft  Comments: Spouse prestn educated pt and spouse in safe transfers and ambulation with rolling walker  Stairs/Curb  Stairs?: Yes  Stairs  # Steps : 4  Stairs Height: 4\" (4\" x2, 8\" x 2)  Device: Rolling walker  Assistance: Contact guard assistance;Stand by assistance  Comment: 1st attempt pt needed CGA, 2nd attempt pt demosntares going up and down steps with rolling walker at SBA-spouse/pt educated in correct sequencing. Other exercises  Other exercises?: Yes  Other exercises 1: Educated in supine and seatd TKA protocol ex's with hand out. Discussed with pt/spouse, standing ex's to start with Home therapist next day.      Plan   Plan  Times per week: NA    G-Code       OutComes Score                                                  AM-PAC Score  AM-PAC Inpatient Mobility Raw Score : 23 (01/24/22 1716)  AM-PAC Inpatient T-Scale Score : 56.93 (01/24/22 1716)  Mobility Inpatient CMS 0-100% Score: 11.2 (01/24/22 1716)  Mobility Inpatient CMS G-Code Modifier : CI (01/24/22 1716)          Goals  Short term goals  Time Frame for Short term goals: NA-pt being DC home  Patient Goals   Patient goals : NA       Therapy Time   Individual Concurrent Group Co-treatment   Time In 1318         Time Out 1413         Minutes 55         Timed Code Treatment Minutes: [address] [facility] Marilia Loaiza, PT

## 2022-01-24 NOTE — ANESTHESIA PRE PROCEDURE
Department of Anesthesiology  Preprocedure Note       Name:  César Lema   Age:  78 y.o.  :  1955                                          MRN:  [id number]         Date:  2022      Surgeon: Gianluca Cortez):  Jeovany Varghese MD    Procedure: Procedure(s):  KNEE TOTAL ARTHROPLASTY REVISION    Medications prior to admission:   Prior to Admission medications    Medication Sig Start Date End Date Taking? Authorizing Provider   ibuprofen (ADVIL;MOTRIN) 200 MG tablet Take 200 mg by mouth every 6 hours as needed for Pain   Yes Historical Provider, MD   nitrofurantoin, macrocrystal-monohydrate, (MACROBID) 100 MG capsule TAKE ONE CAPSULE BY MOUTH DAILY AS NEEDED 10/1/21  Yes BELKYS Martinez CNP   escitalopram (LEXAPRO) 10 MG tablet TAKE ONE TABLET BY MOUTH DAILY 10/1/21  Yes BELKYS Martinez CNP   NITROFURANTOIN PO Take by mouth as needed    Historical Provider, MD       Current medications:    Current Facility-Administered Medications   Medication Dose Route Frequency Provider Last Rate Last Admin    0.9 % sodium chloride infusion   IntraVENous PRN Shikha Crooks MD        ceFAZolin (ANCEF) 2000 mg in dextrose 5 % 50 mL IVPB  2,000 mg IntraVENous On Call to [address] [place] [address], MD        acetaminophen (TYLENOL) tablet 1,000 mg  1,000 mg Oral Once eJovany Varghese MD        dexamethasone (PF) (DECADRON) injection 10 mg  10 mg IntraVENous Once Jeovany Varghese MD        gabapentin (NEURONTIN) tablet 600 mg  600 mg Oral Once Jeovany Varghese MD        scopolamine (TRANSDERM-SCOP) transdermal patch 1 patch  1 patch TransDERmal Once Jeovany Varghese MD        lidocaine PF 1 % injection 1 mL  1 mL IntraDERmal Once PRN Dane Elkins MD        lactated ringers infusion   IntraVENous Continuous Dane Elkins MD           Allergies:     Allergies   Allergen Reactions    Claritin [Loratadine] Other (See Comments)     headache    Demerol Nausea And Vomiting       Problem List:    Patient Active Problem List   Diagnosis Code    Chronic hepatitis C without hepatic coma (HCC) B18.2    Ovarian cyst rupture N83.209    Uterine rupture S37.69XA    Iron deficiency anemia D50.9    Anxiety F41.9    Depression F32. 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[facility])     Depression     DJD (degenerative joint disease)     DVT (deep venous thrombosis) (HCC) 2014    Left leg    GERD (gastroesophageal reflux disease)     Hemorrhage     after surgery 3 times due to Delta storage pool platelet disorder    Hepatitis C     H/O after blood transfusion    History of blood transfusion     had transfusions 3 times in the past    Hx of blood clots     Osteoarthritis     knees    Ovarian cyst rupture     H/O, was hemorrhaging, had surgery    Placenta accreta     was bleeding internally, had  and hyst at the same time    PONV (postoperative nausea and vomiting)     Prolapsed bladder     had surgery to repair    Prolonged emergence from general anesthesia     after total knee surgery    Uterine rupture     due to placenta accreta, had hyst with     UTI (lower urinary tract infection)     frequent       Past Surgical History:        Procedure Laterality Date    BLADDER SUSPENSION      BREAST BIOPSY       SECTION  7/15/1979    along with a hyst, removal of 1 ovary    COLONOSCOPY      DILATION AND CURETTAGE OF UTERUS      x 2    HYSTERECTOMY  7/15/1979    uterine rupture due to placenta accreta    JOINT REPLACEMENT      see knee arthroplasty    LIVER BIOPSY      OTHER SURGICAL HISTORY Left 8 4 14    tarsal tunnel release, micro debridement tendon, gps, onestep cast    [place] RECONSTRUCT PROX HUMERAL IMPLANT Left 10/10/2018    SHOULDER TOTAL ARTHROPLASTY performed by Lord Lc MD [address]        Social History:    Social History     Tobacco Use    Smoking status: Current Every Day Smoker     Packs/day: 0.50     Years: 30.00     Pack years: 15.00     Types: Cigarettes    Smokeless tobacco: Never Used    Tobacco comment: patient currently trying to quit   Substance Use Topics    Alcohol use: Yes     Alcohol/week: 0.8 standard drinks     Types: 1 Standard drinks or equivalent per week     Comment: once a week                                Ready to quit: Not Answered  Counseling given: Not Answered  Comment: patient currently trying to quit      Vital Signs (Current):   Vitals:    01/24/22 0621   BP: 127/68   Pulse: 63   Resp: 18   Temp: 96.8 °F (36 °C)   TempSrc: Infrared   SpO2: 97%   Weight: 135 lb (61.2 kg)   Height: 5' (1.524 m)                                              BP Readings from Last 3 Encounters:   01/24/22 127/68   01/10/22 (!) 142/75   06/03/21 136/84       NPO Status: Time of last liquid consumption: 2330                        Time of last solid consumption: 2200                        Date of last liquid consumption: 01/23/22                        Date of last solid food consumption: 01/23/22    BMI:   Wt Readings from Last 3 Encounters:   01/24/22 135 lb (61.2 kg)   01/10/22 135 lb (61.2 kg)   12/02/21 125 lb (56.7 kg)     Body mass index is 26.37 kg/m².     CBC:   Lab Results   Component Value Date    WBC 9.6 01/10/2022    RBC 4.65 01/10/2022    HGB 15.3 01/10/2022    HCT 44.4 01/10/2022    MCV 95.4 01/10/2022    RDW 13.7 01/10/2022     01/10/2022       CMP:   Lab Results   Component Value Date     01/10/2022    K 4.5 01/10/2022    CL 97 01/10/2022    CO2 25 01/10/2022    BUN 14 01/10/2022    CREATININE 0.59 01/10/2022    GFRAA >60 01/10/2022    LABGLOM >60 01/10/2022    GLUCOSE 85 01/10/2022    PROT 7.4 11/02/2018    CALCIUM 9.6 01/10/2022    BILITOT 0.45 11/02/2018    ALKPHOS 64 11/02/2018    AST 11 11/02/2018    ALT 7 11/02/2018       POC Tests: No results for input(s): POCGLU, POCNA, POCK, POCCL, POCBUN, POCHEMO, POCHCT in the last 72 hours. Coags:   Lab Results   Component Value Date    PROTIME 10.4 01/08/2015    PROTIME 26.4 12/15/2014    INR 1.0 01/08/2015    APTT 26.4 01/08/2015       HCG (If Applicable): No results found for: PREGTESTUR, PREGSERUM, HCG, HCGQUANT     ABGs: No results found for: PHART, PO2ART, DYA4JNX, NVU5ZQI, BEART, H6CIFVDV     Type & Screen (If Applicable):  No results found for: LABABO, LABRH    Drug/Infectious Status (If Applicable):  No results found for: HIV, HEPCAB    COVID-19 Screening (If Applicable): No results found for: COVID19        Anesthesia Evaluation  Patient summary reviewed and Nursing notes reviewed   history of anesthetic complications: PONV. Airway: Mallampati: II  TM distance: >3 FB   Neck ROM: full  Mouth opening: > = 3 FB Dental: normal exam         Pulmonary:Negative Pulmonary ROS and normal exam  breath sounds clear to auscultation      (-) pneumonia, COPD, asthma, shortness of breath, recent URI, sleep apnea, rhonchi, wheezes, rales, stridor, not a current smoker and no decreased breath sounds          Patient smoked on day of surgery.                  Cardiovascular:  Exercise tolerance: good (>4 METS),       (-) pacemaker, hypertension, valvular problems/murmurs, past MI, CAD, CABG/stent, dysrhythmias,  angina,  CHF, orthopnea, PND,  KRISHNA, murmur, weak pulses,  friction rub, systolic click, carotid bruit,  JVD, peripheral edema, no pulmonary hypertension and no hyperlipidemia    ECG reviewed  Rhythm: regular  Rate: normal           Beta Blocker:  No for medical reason         Neuro/Psych:   (+) neuromuscular disease:, psychiatric history: stable with treatmentdepression/anxiety    (-) seizures, TIA, CVA and headaches           GI/Hepatic/Renal:   (+) GERD: no interval change, hepatitis: C, liver disease:,      (-) hiatal hernia, PUD, no renal disease, bowel prep and no morbid obesity       Endo/Other: Negative Endo/Other ROS   (+) no malignancy/cancer. (-) diabetes mellitus, hypothyroidism, hyperthyroidism, blood dyscrasia, arthritis, no electrolyte abnormalities, no malignancy/cancer               Abdominal:             Vascular: negative vascular ROS. - PVD, DVT and PE. Other Findings:           Anesthesia Plan      general and regional     ASA 3       Induction: intravenous. MIPS: Postoperative opioids intended and Prophylactic antiemetics administered. Anesthetic plan and risks discussed with patient. Plan discussed with CRNA.                   Valente Sam MD   1/24/2022

## 2022-01-25 ENCOUNTER — CARE COORDINATION (OUTPATIENT)
Dept: CASE MANAGEMENT | Age: 67
End: 2022-01-25

## 2022-01-25 DIAGNOSIS — T84.018A FAILURE OF TOTAL KNEE ARTHROPLASTY, INITIAL ENCOUNTER (HCC): Primary | ICD-10-CM

## 2022-01-25 DIAGNOSIS — Z96.659 FAILURE OF TOTAL KNEE ARTHROPLASTY, INITIAL ENCOUNTER (HCC): Primary | ICD-10-CM

## 2022-01-25 PROCEDURE — 1111F DSCHRG MED/CURRENT MED MERGE: CPT | Performed by: NURSE PRACTITIONER

## 2022-01-25 NOTE — CARE COORDINATION
[facility] Transitions Initial Follow Up Call    Call within 2 business days of discharge: Yes    Patient: Andre Benjamin Patient : 1955   MRN: [id number]  Reason for Admission: left knee loose hardware   Discharge Date: 22 RARS: Readmission Risk Score: 4.7 ( )      Last Discharge [facility]       Complaint Diagnosis Description Type Department Provider    22  Preop testing . .. Admission (Discharged) Mary Santiago MD           Spoke with: Shree Barrientos who states she is tired and sore, denies chest pain, SOB, dizziness, is eating and drinking well, normal bowel bladder is having hesitentcy starting a stream but when it starts she is emptying well. CTN discussed concerns and when to seek medical attention for urinary retention. patient v/u. Knee is swollen bandage removed this am bruising noted to knee and calves. Pain is manageable PT was at home this am. Medication reviewed and taking as directed. No concerns at present call. Facility: [facility]    Non-face-to-face services provided:  Obtained and reviewed discharge summary and/or continuity of care documents  Transitions of Care Initial Call    Was this an external facility discharge? No   Challenges to be reviewed by the provider   Additional needs identified to be addressed with provider: No  none             Method of communication with provider : none      Advance Care Planning:   Does patient have an Advance Directive: reviewed and needs to be updated, not on file; education provided, not on file, patient declined education, decision maker updated and referral to internal ACP facilitator. Was this a readmission? No  Patient stated reason for admission: knee replacement  Patients top risk factors for readmission: functional physical ability  medication management    Care Transition Nurse (CTN) contacted the patient by telephone to perform post hospital discharge assessment.  Verified name and  with patient as identifiers. Provided introduction to self, and explanation of the CTN role. CTN reviewed discharge instructions, medical action plan and red flags with patient who verbalized understanding. Patient given an opportunity to ask questions and does not have any further questions or concerns at this time. Were discharge instructions available to patient? Yes. Reviewed appropriate site of care based on symptoms and resources available to patient including: PCP, Specialist and Home health. The patient agrees to contact the PCP office for questions related to their healthcare. Medication reconciliation was performed with patient, who verbalizes understanding of administration of home medications. Advised obtaining a 90-day supply of all daily and as-needed medications. Covid Risk Education     Educated patient about risk for severe COVID-19 due to risk factors according to CDC guidelines. LPN CC reviewed discharge instructions, medical action plan and red flag symptoms with the patient who verbalized understanding. Discussed COVID vaccination status: Yes. Education provided on COVID-19 vaccination as appropriate. Discussed exposure protocols and quarantine with CDC Guidelines. Patient was given an opportunity to verbalize any questions and concerns and agrees to contact LPN CC or health care provider for questions related to their healthcare. Reviewed and educated patient on any new and changed medications related to discharge diagnosis. Was patient discharged with a pulse oximeter? No Discussed and confirmed pulse oximeter discharge instructions and when to notify provider or seek emergency care. LPN CC provided contact information. Plan for follow-up call in 5-7 days based on severity of symptoms and risk factors.   Plan for next call: symptom management-pain ambulation      Care Transitions 24 Hour Call    Do you have any ongoing symptoms?: Yes  Patient-reported symptoms: Pain, Other (Comment: slow urine stream)  Interventions for patient-reported symptoms: Other  Do you have a copy of your discharge instructions?: Yes  Do you have all of your prescriptions and are they filled?: Yes  Have you been contacted by a [address]?: No  Have you scheduled your follow up appointment?: Yes  How are you going to get to your appointment?: Car - family or friend to transport  Were you discharged with any Home Care or Post Acute Services: Yes  Post Acute Services:  Place [facility] (Comment: [facility])  Do you feel like you have everything you need to keep you well at home?: Yes  Care Transitions Interventions         Follow Up  Future Appointments   Date Time Provider Christie Arshad   2/7/2022  1:00 PM Camden Scott MD [facility]

## 2022-01-26 ENCOUNTER — TELEPHONE (OUTPATIENT)
Dept: ORTHOPEDIC SURGERY | Age: 67
End: 2022-01-26

## 2022-01-26 LAB — SURGICAL PATHOLOGY REPORT: NORMAL

## 2022-01-26 NOTE — TELEPHONE ENCOUNTER
Called patient and spoke with her concerning her having blisters near bandages. Spoke with  who stated that this is normal and to keep the area covered.  Advised pt to continue to wear her compression stockings

## 2022-02-01 ENCOUNTER — CARE COORDINATION (OUTPATIENT)
Dept: CASE MANAGEMENT | Age: 67
End: 2022-02-01

## 2022-02-01 NOTE — CARE COORDINATION
[facility] Transitions Follow Up Call    2022    Patient: Gallito Yoon  Patient : 1955   MRN: [id number]  Reason for Admission:   Discharge Date: 22 RARS: Readmission Risk Score: 4.7 ( )         Spoke with: Finn Bush with patient    Incision status: some blisters around incision    Edema/Swelling using the ace bandage rather then the compression stockings, lots of edema to knee and foot    Pain level and status: 6-7 taking pain medication with relief    Use of pain medications: as needed      Bowels: was just taking a stool softener, took some miralax on  with results    [address]  active:  Maureen Arteaga    Outpatient therapy:     Do you have all of your medications:     Changes in medications: Follow up appointments:    Future Appointments   Date Time Provider Christie Arshad   2022  1:00 PM Glo Umaña MD [address] [facility] Transitions Subsequent and Final Call    Subsequent and Final Calls  Do you have any ongoing symptoms?: Yes  Onset of Patient-reported symptoms: Today  Patient-reported symptoms: Other, Pain  Do you have any questions related to your medications?: No  Do you currently have any active services?: Yes  Are you currently active with any services?: Home Health  Do you have any needs or concerns that I can assist you with?: No  Care Transitions Interventions  Other Interventions:            Follow Up  Future Appointments   Date Time Provider Christie Arshad   2022  1:00 PM Glo Umaña MD [place] Joselyn Marquez RN

## 2022-02-07 ENCOUNTER — OFFICE VISIT (OUTPATIENT)
Dept: ORTHOPEDIC SURGERY | Age: 67
End: 2022-02-07

## 2022-02-07 DIAGNOSIS — Z96.652 STATUS POST REVISION OF TOTAL REPLACEMENT OF LEFT KNEE: Primary | ICD-10-CM

## 2022-02-07 PROCEDURE — 99024 POSTOP FOLLOW-UP VISIT: CPT | Performed by: ORTHOPAEDIC SURGERY

## 2022-02-07 RX ORDER — OXYCODONE HYDROCHLORIDE AND ACETAMINOPHEN 5; 325 MG/1; MG/1
1 TABLET ORAL EVERY 6 HOURS PRN
Qty: 28 TABLET | Refills: 0 | Status: SHIPPED | OUTPATIENT
Start: 2022-02-07 | End: 2022-02-14

## 2022-02-07 NOTE — PROGRESS NOTES
Gregory Garrett M.D.            [address] [facility], [address] [facility],[address], [facility].           Dept Phone: [phone]           Dept Fax:  [phone], Formerly [facility], [facility]          Dept Phone: [phone]           Dept Fax:  [phone]      Chief Compliant:  Chief Complaint   Patient presents with    Post-Op Check     Lt TKA revision 1/24/22        History of Present Illness:  Patient returns today status post left revision TKA times 2 weeks. Patient has no major complaints     Review of Systems   Constitutional: Negative for fever, chills, sweats, recent injury, recent illness  Neurological: Negative for Headaches, numbness, or weakness. Integumentary: Negative for rash, itching, ecchymosis, or wounds. Musculoskeletal: Positive for Post-Op Check (Lt TKA revision 1/24/22)       Physical Exam:  Constitutional: Patient is oriented to person, place, and time. Patient appears well-developed and well nourished. Musculoskeletal: Normal gait. Motion 0-100 degrees with expected pain with ROM. No Calf tenderness, Negative Jackie's sign. Neurovascular intact. Neurological: Patient is alert and oriented to person, place, and time. Normal strenght. No sensory deficit. Skin: Skin is warm and dry. Incision is healing well without signs of redness or drainage  Nursing note and vitals reviewed. Labs and Imaging:     XR taken today:  XR KNEE LEFT (1-2 VIEWS)    Result Date: 2/7/2022  X-rays taken today reviewed by me show standing AP both knees and lateral left knee. Patient status post revision left total knee arthroplasty for presumed loosening of the tibial component. Patient has a stemmed tibial component with cement screw fixation medially. Overall alignment of components are in excellent position on AP lateral views.   Patient also had a previous right total knee arthroplasty. Orders Placed This Encounter   Procedures    XR KNEE LEFT (1-2 VIEWS)     Standing Status:   Future     Number of Occurrences:   1     Standing Expiration Date:   2/8/2022   [facility] [place]     Referral Priority:   Routine     Referral Type:   Eval and Treat     Referral Reason:   Specialty Services Required     Requested Specialty:   Physical Therapy     Number of Visits Requested:   1       Assessment and Plan:  1. Status post revision of total replacement of left knee        2 weeks status post left revision TKA for aseptic loosening tibial component        This is a 78 y.o. female who presents to the clinic today status post left revision TKA. Continue anticoagulation. Transition to outpatient Pt. Restrictions given. RTO 5-6 weeks. Call if any problems/issues prior to that       Provider Attestation:  Florence Obregon, personally performed the services described in this documentation. All medical record entries made by the scribe were at my direction and in my presence. I have reviewed the chart and discharge instructions and agree that the records reflect my personal performance and is accurate and complete. Prakash Jose MD. 02/07/22        Please note that this chart was generated using voice recognition Dragon dictation software. Although every effort was made to ensure the accuracy of this automated transcription, some errors in transcription may have occurred.

## 2022-02-08 ENCOUNTER — CARE COORDINATION (OUTPATIENT)
Dept: CASE MANAGEMENT | Age: 67
End: 2022-02-08

## 2022-02-08 NOTE — CARE COORDINATION
[facility] Transitions Follow Up Call    2022    Patient: Patrizia Turner  Patient : 1955   MRN: [id number]  Reason for Admission: left knee loose hardware   Discharge Date: 22 RARS: Readmission Risk Score: 4.7 ( )         Spoke with: Enoch Lewis she states she is doing better. She continues to have pain in her knee off and on pain medication working well. Continues with PT at home next week will start outpatient. Blisters around incision popped and healing had stitches removed and Dr very happy with how things look. She is having regular bowel movements now and has no concerns. Care Transitions Follow Up Call    Needs to be reviewed by the provider   Additional needs identified to be addressed with provider: No  none             Method of communication with provider : none      Care Transition Nurse (CTN) contacted the patient by telephone to follow up after admission on 22. Verified name and  with patient as identifiers. Addressed changes since last contact: none  Discussed follow-up appointments. If no appointment was previously scheduled, appointment scheduling offered: Yes. Is follow up appointment scheduled within 7 days of discharge? Yes. Advance Care Planning:   Does patient have an Advance Directive: reviewed and needs to be updated, not on file; education provided, not on file, patient declined education, decision maker updated and referral to internal ACP facilitator. CTN reviewed discharge instructions, medical action plan and red flags with patient and discussed any barriers to care and/or understanding of plan of care after discharge. Discussed appropriate site of care based on symptoms and resources available to patient including: PCP, Specialist and Home health. The patient agrees to contact the PCP office for questions related to their healthcare.      Patients top risk factors for readmission: functional physical ability  medication management  Interventions to address risk factors: Obtained and reviewed discharge summary and/or continuity of care documents          CTN provided contact information for future needs. Plan for follow-up call in 7-10 days based on severity of symptoms and risk factors. Plan for next call: symptom management-routine        Care Transitions Subsequent and Final Call    Subsequent and Final Calls  Do you have any ongoing symptoms?: Yes  Onset of Patient-reported symptoms: In the past 7 days  Patient-reported symptoms: Pain  Interventions for patient-reported symptoms: Notified Home Care  Have your medications changed?: No  Do you have any questions related to your medications?: No  Do you currently have any active services?: Yes  Are you currently active with any services?: Home Health  Do you have any needs or concerns that I can assist you with?: No  Identified Barriers: Other  Care Transitions Interventions  Other Interventions:            Follow Up  Future Appointments   Date Time Provider Christie Arshad   2/16/2022  9:00 AM Milagros Chand PT [facility] [place]   3/21/2022  1:05 PM MD [facility]O Sandoval LPN

## 2022-02-15 PROBLEM — N18.5 CHRONIC KIDNEY DISEASE, STAGE 5 (HCC): Status: RESOLVED | Noted: 2021-06-03 | Resolved: 2022-02-15

## 2022-02-16 ENCOUNTER — HOSPITAL ENCOUNTER (OUTPATIENT)
Dept: PHYSICAL THERAPY | Age: 67
Setting detail: THERAPIES SERIES
Discharge: HOME | End: 2022-02-16
Payer: MEDICARE

## 2022-02-16 PROCEDURE — 97016 VASOPNEUMATIC DEVICE THERAPY: CPT

## 2022-02-16 PROCEDURE — 97161 PT EVAL LOW COMPLEX 20 MIN: CPT

## 2022-02-16 PROCEDURE — 97110 THERAPEUTIC EXERCISES: CPT

## 2022-02-16 NOTE — CONSULTS
[address] [facility]  [address] [facility]         Phone: [phone]       Fax: [phone]    Physical Therapy Lower Extremity Evaluation    Date:  2022  Patient: César Lema  : 1955  MRN: [id number]  Physician: Jeovany Varghese MD     Insurance: Medicare  # of visits allowed/remaining: follow medicare guidelines / 60/60 combined with OT and Speech. [facility]  Medical Diagnosis:  [id number] (ICD-10-CM) - Status post revision of total replacement of left knee   Rehab Codes: M 25.56, M 25.55, R25 pain , M25.60 stiffness, R53.1 weakness  Onset date:  22  Next 's appt. : 3/21 [facility]   Visit Count:    Cancel/No Show: 0/0    Subjective:   CC: L TKA revision   HPI: Patient comes to clinic s/p L TKA revision performed 22. Patient underwent B TKA 12 years ago with good results but reports onset of L knee pain ~10 months ago with gradual onset. Pain continued to worsened leading to difficulty walking and onset of L hip/anterior shin pain. In October patient notes a twisting injury of L knee causing immediate sharp pain. Imaging demonstrated evidence for loosening of the tibial component causing increased motion. L TKA revision with cement screw fixation medially performed  leading to improved symptoms. Pt received home health therapy for 2-3 weeks improving function. Improved L knee swelling reporting but continued L hip, knee, and anterior shin pain reported worsened by sitting. Hx of DVTs 6 years ago noted but denies calf pain/tenderness/additional warmth.      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 Patient status post revision left total knee arthroplasty for presumed loosening of the tibial component. Patient has a stemmed tibial component with cement screw fixation medially. Overall alignment of components are in excellent position on AP lateral views. Patient also had a previous right total knee arthroplasty.       Medications: [x] Refer to full medical record [] None [] Other:  Allergies:      [x] Refer to full medical record [] None [] Other:    Pain location:  L hip, knee, shin    Pain rating/description at rest:  2-3/10 hip - constant/dull  Shin sharp, very sensitive    Pain rating/description at best:  1-2/10  Improves with: advil, ice,    Pain rating/description at worst:   7/10  Worsens with: sitting (more on hip),     Sleep quality/quanity  sleeping on couch d/t liking the support   Sleeping position: R side lying          Function:  Hand Dominance  [x] Right  [] Left              Current level of function:     ADLs Independent in all except:   Dressing: no issues     Mobility: sitting very limited     Lifting/carrying: very limited,  performs     Driving:      Grocery shopping:  performs cooking/cleaning       Home Set up  2 [address], first floor set up with laundry, 1 step through garage no HR, no FILIPPO through front door, walk in shower with grab bars, higher toilette,    Job Description         [] Normal Duty  [] Light Duty   [] Off D/T Condition  [] Retired    [x] Not Employed    []  Disability  Return to work:    Gait    Device: SPC just since surgery 1/24  Distance: 10 min   Assistance: indep   Impairments: antalgic gait, slow ree           Objective:    ROM  ° A/P STRENGTH    Left Right Left Right   Hip Flex   4/5 4+/5   Ext       ER       IR       ABD   4-/5 4/5   ADD       Knee Flex 95, 105*  4/5 5/5   Ext -2  4+/5 5/5 Ankle DF   5/5 5/5   PF   4+/5    INV   4+/5    EVER   4+/5           * = pt reports pain     BALANCE Left  Right   Tandem Stance (Eyes Open) 20 20   Tandem Stance (Eyes Closed)     Single Leg (Eyes Open) 2 15   Single Leg (Eyes Closed     Other: NBOS EC          OBSERVATION No Deficit Deficit Not Tested Comments   Posture       Forward Head [x] [] []    Rounded Shoulders [x] [] []    LE bony alignment  [x] [] []    Leg Length Discrp [x] [] []    Slumped Sitting [x] [] []    Palpation [] [x] [] TTP to L knee globally, L piriformis, L anterior shin hypersensitivity    Sensation [] [x] [] Decreased sensation reported to the dorsal aspect of L foot primarily in digits    Edema [] [x] [] R: 16.25\" pre-patella   L: 18.25 pre-patella                                          [facility] Fall Risk Assessment    Patient Name:  Rafael Valladares  : 1955      Risk Factor Scale  Score   History of Falls [] Yes  [x] No 25  0 0   Secondary Diagnosis [x] Yes  [] No 15  0 15   Ambulatory Aid [] Furniture  [x] Crutches/cane/walker  [] None/bedrest/wheelchair/nurse 30  15  0 15   IV/Heparin Lock [] Yes  [x] No 20  0 0   Gait/Transferring [] Impaired  [x] Weak  [] Normal/bedrest/immobile 20  10  0 10   Mental Status [] Forgets limitations  [x] Oriented to own ability 15  0 0      Total: 40     Based on the Assessment score: check the appropriate box.     []  No intervention needed   Low =   Score of 0-24    [x]  Use standard prevention interventions Moderate =  Score of 24-44   [x] Give patient handout and discuss fall prevention strategies   [x] Establish goal of education for patient/family RE: fall prevention strategies    []  Use high risk prevention interventions High = Score of 45 and higher   [] Give patient handout and discuss fall prevention strategies   [] Establish goal of education for patient/family Re: fall prevention strategies   [] Discuss lifeline / other resources    Electronically signed by:   Parker Dunn PT  Date: 2/16/2022        Assessment: [x] Patient is a pleasant 77 yo female s/p L TKA revision 1/24/22. Overall, patient is performing ADLs independently with minimal difficulty. Impaired L knee ROM demonstrated leading to antalgic and impaired gait with SPC. Limited weight bearing tolerance through L LE demonstrate causing deficits in balance. Mild edema in L LE demonstrated but no additional warmth/redness/tenderness to L calf. Overall increased sensitivity to L foot/shin noted with pain reported with various sensations to area. Patient and  educated on importance of therapy/HEP, monitoring L calf/LE for DVT symptoms and to notify PT or physician with any changes. Patient would benefit from skilled therapy to improve L knee ROM, increase L LE strength, progress balance, and normalize gait to return to PLOF. Functional Assessment tool: LEFI  Functional Assessment score: 33/80 (58.76%)     STG: (to be met in 7 treatments)  1. ? Pain: Pt will report decreased pain 3/10 or less to improve sitting tolerance to 20 minutes   2. ? ROM: Pt will increase L knee flexion from 95 to 105 or higher to minimize gait impairments   3. ? Strength: Pt will demonstrate improved L knee strength to 4+/5 and hip strength 4/5 or higher   4. ? Function: Pt will ascend/descend 1 step independently with no UE support for max home safety   5. Independent with Home Exercise Programs  6. Pt will report understanding of fall risk prevention strategies     LTG: (to be met in 14 treatments)  1. Pt will demonstrate L knee extension to 0, and flexion to 115 or higher to normalize gait   2. Pt will ambulate 200 ft with no AD demonstrating no gait impairments to return to community participation   3. Pt will demonstrate L knee strength 5/5 and L hip strength 4+/5 to perform cooking/cleaning duties independently  4. Pt will report decreased functional impairment via LEFI from 33/80 to 45/80 to return to PLOF  5.  Pt will demonstrate improved balance evident by SLS of L LE to 10 seconds or more to decrease risk of falls                    Patient goals: walk normally       Evaluation Complexity:  History (Personal factors, comorbidities) [] 0 [] 1-2 [x] 3+   Exam (limitations, restrictions) [] 1-2 [x] 3 [] 4+   Clinical presentation (progression) [] Stable [] Evolving  [] Unstable   Decision Making [x] Low [] Moderate [] High    [x] Low Complexity [] Moderate Complexity [] High Complexity       Rehab Potential:  [x] Good  [] Fair  [] Poor   Suggested Professional Referral:  [x] No  [] Yes:  Barriers to Goal Achievement[de-identified]  [x] No  [] Yes:  Domestic Concerns:  [x] No  [] Yes:     Pt. Education:  [x] Plans/Goals, Risks/Benefits discussed  [x] Home exercise program    Method of Education: [x] Verbal  [x] Demo  [x] Written  Comprehension of Education:  [x] Verbalizes understanding. [x] Demonstrates understanding. [x] Needs Review. [] Demonstrates/verbalizes understanding of HEP/Ed previously given.     Treatment Plan:  [x] Therapeutic Exercise   30581  [] Iontophoresis: 4 mg/mL Dexamethasone Sodium Phosphate  mAmin  03353   [] Therapeutic Activity  30237 [x] Vasopneumatic cold with compression  59243    [] Gait Training   49715 [] Ultrasound   79930   [x] Neuromuscular Re-education  40098 [] Electrical Stimulation Unattended  36232   [x] Manual Therapy  72909 [] Electrical Stimulation Attended  13787   [x] Instruction in HEP  [] Lumbar/Cervical Traction  15887   [] Aquatic Therapy   99119 [x] Cold/hotpack    [] Massage   91577      [] Dry Needling, 1 or 2 muscles  87761   [] Biofeedback, first 15 minutes   67331  [] Biofeedback, additional 15 minutes   67028 [] Dry Needling, 3 or more muscles  29723       Frequency:   2x/week for 14 visits      Todays Treatment:  Modalities:   Vaso: 34 degrees, low pressure, foot elevated, 10 min   Precautions: hx of blood clots, pool platelet disorder (monitor sx of DVT)    Exercises:  Exercise Reps/ Time Weight/ Level Comments   Supine      Heel slides w/ strap 2x10 Hold 3\"    Calf stretch  3x30\"     Sitting hamstring stretch 3x30\"           Standing       3 way hip  2x10     marches x20           education 10 min  - importance of therapy and HEP  - monitoring sx of DVT  - transition of sleep to couch    Other: provided for written HEP     Specific Instructions for next treatment: progress L knee ROM, increase L knee/hip strength, progress balance to normalize gait, education in safe lifting/carrying techniques       Treatment Charges: Mins Units   [x] Evaluation       [x]  Low       []  Moderate       []  High 25 1   []  Modalities     [x]  Ther Exercise 20 1   []  Manual Therapy     []  Ther Activities     []  Aquatics     []  Neuromuscular     []  Gait Training     []  Dry Needling           1-2 muscles     []  Dry Needling           3 or more          muscles     [x] Vasocompression 10 1   []  Other         TOTAL TREATMENT TIME: 55 min     Time in:  8:10 am     Time Out: 9:05am     Electronically signed by: Rudi Herrmann PT        Physician Signature:________________________________Date:__________________  By signing above or cosigning this note, I have reviewed this plan of care and certify a need for medically necessary rehabilitation services.      *PLEASE SIGN ABOVE AND FAX BACK ALL PAGES*

## 2022-02-17 ENCOUNTER — CARE COORDINATION (OUTPATIENT)
Dept: CASE MANAGEMENT | Age: 67
End: 2022-02-17

## 2022-02-17 NOTE — CARE COORDINATION
[facility] Transitions Follow Up Call    2022    Patient: Issa Esquivel  Patient : 1955   MRN: [id number]  Reason for Admission:  left knee loose hardware   Discharge Date: 22 RARS: Readmission Risk Score: 4.7 ( )         Spoke with:Mavis she states she is doing well denies chest pain, SOB, dizziness, knee has pain at times started Out patient PT yesterday and they are pleased with her status thus far. Incision healing well. Normal bowel and bladder elimination patient has no concerns and is agreeable to end calls today. Care Transitions Follow Up Call    Needs to be reviewed by the provider   Additional needs identified to be addressed with provider: No  none             Method of communication with provider : none      Care Transition Nurse (CTN) contacted the patient by telephone to follow up after admission on . Verified name and  with patient as identifiers. Addressed changes since last contact: outpatient pt started   Discussed follow-up appointments. If no appointment was previously scheduled, appointment scheduling offered: Yes. Is follow up appointment scheduled within 7 days of discharge? Yes. Advance Care Planning:   Does patient have an Advance Directive: reviewed and needs to be updated, not on file; education provided, not on file, patient declined education, decision maker updated and referral to internal ACP facilitator. CTN reviewed discharge instructions, medical action plan and red flags with patient and discussed any barriers to care and/or understanding of plan of care after discharge. Discussed appropriate site of care based on symptoms and resources available to patient including: PCP and Specialist. The patient agrees to contact the PCP office for questions related to their healthcare.      Patients top risk factors for readmission: functional physical ability  medication management  Interventions to address risk factors: Obtained and reviewed

## 2022-02-18 ENCOUNTER — HOSPITAL ENCOUNTER (OUTPATIENT)
Dept: PHYSICAL THERAPY | Age: 67
Setting detail: THERAPIES SERIES
Discharge: HOME | End: 2022-02-18
Payer: MEDICARE

## 2022-02-18 PROCEDURE — 97110 THERAPEUTIC EXERCISES: CPT

## 2022-02-18 PROCEDURE — 97016 VASOPNEUMATIC DEVICE THERAPY: CPT

## 2022-02-18 NOTE — FLOWSHEET NOTE
[address] [facility]   [address] [facility] [address]   Phone: [phone]   Fax: [phone]    Physical Therapy Daily Treatment Note      Date:  2022  Patient Name:  Soledad Polo    :  1955  MRN: [id number]  Physician: Magali Zuleta MD                                 Insurance: Medicare  # of visits allowed/remaining: follow medicare guidelines / 60 combined with OT and Speech. HARD [employer]. Gabo Counts include 234 beds at the [facility]  Medical Diagnosis:   [id number] (ICD-10-CM) - Status post revision of total replacement of left knee       Rehab Codes: M 25.56, M 25.55, R25 pain , M25.60 stiffness, R53.1 weakness  Onset date:    22                        Next 's appt. : 3/21 Melanie   Visit Count:                                 Cancel/No Show: 0/0  Re-eval by: visit 10 or 3/16     Subjective:    Pain:  [x] Yes  [] No Location: L hip, knee, shin Pain Rating: (0-10 scale) 4/10 hip, 4/10 shin, 3/10 knee   Pain altered Tx:  [] No  [] Yes  Action:  Comments: Pt ambulates into clinic with Catrachita Fisher noting increased soreness this date. Pt notes good compliance to HEP but notes increased soreness in hamstring.      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)    Exercises:  Exercise Reps/ Time Weight/ Level Comments completed 2022     Supine          Heel slides w/ strap 2x10 Hold 3\"   X   Calf stretch  3x30\"        hamstring stretch 3x30\"     X   SLR 2x10   X   bridges x10   X   Side-lying hip abduction 2x10   X              Standing           3 way hip  2x10    watch for pelvis rotation  X   marches x20    1 UE support X   squats x10  Cues to increase weight bearing through L LE X   Heel/toe raises add      Slant board stretch 3x30\"   X              Ladder play       Walking/marching 3x ea   X   Side stepping add             Other:       Specific Instructions for next treatment: progress L knee ROM, increase L knee/hip strength, progress balance to normalize gait, education in safe lifting/carrying techniques       Assessment: [x] Progressing toward goals. Patient comes to therapy post evaluation with good tolerance to therapy. No warmth, erythema, or pain to L calf demonstrated therefore no concerns for DVT. Increased cues provided throughout standing activities to increase weight through L LE. Ladder play facilitated to increase L LE stance time and normalize gait. Vaso provided at end of session to decrease pain and for edema management. [] No change. [] Other:    [x] Patient would benefit from skilled therapy to improve L knee ROM, increase L LE strength, progress balance, and normalize gait to return to PLOF. STG: (to be met in 7 treatments)  1. ? Pain: Pt will report decreased pain 3/10 or less to improve sitting tolerance to 20 minutes   2. ? ROM: Pt will increase L knee flexion from 95 to 105 or higher to minimize gait impairments   3. ? Strength: Pt will demonstrate improved L knee strength to 4+/5 and hip strength 4/5 or higher   4. ? Function: Pt will ascend/descend 1 step independently with no UE support for max home safety   5. Independent with Home Exercise Programs  6. Pt will report understanding of fall risk prevention strategies      LTG: (to be met in 14 treatments)  1. Pt will demonstrate L knee extension to 0, and flexion to 115 or higher to normalize gait   2. Pt will ambulate 200 ft with no AD demonstrating no gait impairments to return to community participation   3. Pt will demonstrate L knee strength 5/5 and L hip strength 4+/5 to perform cooking/cleaning duties independently  4. Pt will report decreased functional impairment via LEFI from 33/80 to 45/80 to return to PLOF  5. Pt will demonstrate improved balance evident by SLS of L LE to 10 seconds or more to decrease risk of falls                     Patient goals: walk normally     Pt. Education:  [] Yes  [] No  [] Reviewed Prior HEP/Ed  Method of Education: [] Verbal  [] Demo  [] Written  Comprehension of Education:  [] Verbalizes understanding. [] Demonstrates understanding. [] Needs review. [] Demonstrates/verbalizes HEP/Ed previously given. Plan: [] Continue per plan of care.    [] Other:      Treatment Charges: Mins Units   []  Modalities     [x]  Ther Exercise 37 2   []  Manual Therapy     []  Ther Activities     []  Aquatics     []  Neuromuscular     [x] Vasocompression 10 1   [] Gait Training     [] Dry needling        [] 1 or 2 muscles        [] 3 or more muscles     []  Other     Total Treatment time 47 3     Time In: 0800            Time Out: 9202    Electronically signed by:  Marlye Gibson PT

## 2022-02-22 ENCOUNTER — HOSPITAL ENCOUNTER (OUTPATIENT)
Dept: PHYSICAL THERAPY | Age: 67
Setting detail: THERAPIES SERIES
Discharge: HOME | End: 2022-02-22
Payer: MEDICARE

## 2022-02-22 PROCEDURE — 97016 VASOPNEUMATIC DEVICE THERAPY: CPT

## 2022-02-22 PROCEDURE — 97110 THERAPEUTIC EXERCISES: CPT

## 2022-02-22 NOTE — FLOWSHEET NOTE
[facility]   [address]   Phone: [phone]   Fax: [phone]    Physical Therapy Daily Treatment Note      Date:  2022  Patient Name:  Sofie Redmond    :  1955  MRN: [id number]  Physician: Romulo Morin MD                                 Insurance: Medicare  # of visits allowed/remaining: follow medicare guidelines / 60 combined with OT and Speech. HARD [employer]. Alejandrojannet Cortez  Medical Diagnosis:   [id number] (ICD-10-CM) - Status post revision of total replacement of left knee       Rehab Codes: M 25.56, M 25.55, R25 pain , M25.60 stiffness, R53.1 weakness  Onset date:    22                        Next 's appt. : 3/21 Melanie   Visit Count: 3/14                                Cancel/No Show: 0/0  Re-eval by: visit 10 or 3/16     Subjective:  Patient reporting to therapy with SPC and reporting increase soreness this session due to possibly sleeping wrong last night.   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)    Exercises:  Exercise Reps/ Time Weight/ Level Comments completed 2022     NuStep 5   2  X   Supine          Quad sets  20x 3\"   Bolster under ankle X   Heel slides w/ strap 2x10 Hold 3\"   X   Calf stretch  3x30\"        hamstring stretch 3x30\"     X   SLR 2x10   X   bridges x10      Side-lying hip abduction 2x10                 Standing           3 way hip  2x10    watch for pelvis rotation; 15x this session X   marches x20    1 UE support    squats x10  Cues to increase weight bearing through L LE    TG squats 10x2   X   Heel/toe raises 20x   X   Slant board stretch 3x30\"                 Ladder play       Walking/marching 3x ea      Side stepping add Other:       Specific Instructions for next treatment: progress L knee ROM, increase L knee/hip strength, progress balance to normalize gait, education in safe lifting/carrying techniques       Assessment: [x] Progressing toward goals. Began session on NuStep as preparation for strengthening and ROM exercises. Added heel/toe raises with BUE support with patient reporting that she feels good when performing them. Will progress to No UE support as tolerated. Added heel slides to continue with improving knee ROM and SLR to improve hip strength. [] No change. [] Other:    [x] Patient would benefit from skilled therapy to improve L knee ROM, increase L LE strength, progress balance, and normalize gait to return to PLOF. STG: (to be met in 7 treatments)  1. ? Pain: Pt will report decreased pain 3/10 or less to improve sitting tolerance to 20 minutes   2. ? ROM: Pt will increase L knee flexion from 95 to 105 or higher to minimize gait impairments   3. ? Strength: Pt will demonstrate improved L knee strength to 4+/5 and hip strength 4/5 or higher   4. ? Function: Pt will ascend/descend 1 step independently with no UE support for max home safety   5. Independent with Home Exercise Programs  6. Pt will report understanding of fall risk prevention strategies      LTG: (to be met in 14 treatments)  1. Pt will demonstrate L knee extension to 0, and flexion to 115 or higher to normalize gait   2. Pt will ambulate 200 ft with no AD demonstrating no gait impairments to return to community participation   3. Pt will demonstrate L knee strength 5/5 and L hip strength 4+/5 to perform cooking/cleaning duties independently  4. Pt will report decreased functional impairment via LEFI from 33/80 to 45/80 to return to PLOF  5. Pt will demonstrate improved balance evident by SLS of L LE to 10 seconds or more to decrease risk of falls                     Patient goals: walk normally     Pt.  Education:  [] Yes  [] No  []

## 2022-02-24 ENCOUNTER — HOSPITAL ENCOUNTER (OUTPATIENT)
Dept: PHYSICAL THERAPY | Age: 67
Setting detail: THERAPIES SERIES
Discharge: HOME | End: 2022-02-24
Payer: MEDICARE

## 2022-02-24 PROCEDURE — 97110 THERAPEUTIC EXERCISES: CPT

## 2022-02-24 NOTE — FLOWSHEET NOTE
[facility]   [address]  [facility] [address]   Phone: [phone]   Fax: [phone]    Physical Therapy Daily Treatment Note      Date:  2022  Patient Name:  Nicola Cole    :  1955  MRN: [id number]  Physician: Arielle Robledo MD                                 Insurance: Medicare  # of visits allowed/remaining: follow medicare guidelines / /60 combined with OT and Speech. HARD [employer]. [employer]  Medical Diagnosis:   [id number] (ICD-10-CM) - Status post revision of total replacement of left knee       Rehab Codes: M 25.56, M 25.55, R25 pain , M25.60 stiffness, R53.1 weakness  Onset date:    22                        Next Dr's appt. : 3/21 Melanie   Visit Count:                                 Cancel/No Show: 0/0  Re-eval by: visit 10 or 3/16     Subjective:  Patient reporting to therapy with SPC and reporting increase soreness this session due to possibly sleeping wrong last night. Pain:  [x] Yes  [] No Location: L hip, knee, shin Pain Rating: (0-10 scale) 4/10  Pain altered Tx:  [] No  [] Yes  Action:  Comments: Pt arrived to PT session c/o L shin to ankle rated 3-4/10, noted took Percocet an hour prior coming to PT session to manage pain. Pt reported had to drive  to eye doctor's appt and prolonged sitting to drive aggravated pt's sciatia. Pt reported waking up this morning with L knee pain rated at 1/10 and increase to 3-4/10 at PT session \"I think it's because I drive here\".       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)    Exercises:  Exercise Reps/ Time Weight/ Level Comments completed 2022     NuStep 5   2  X   Supine          Quad sets  20x 3\"   Bolster under ankle    Heel slides w/ strap 2x10 Hold 3\" : KF AROM = 135° X   Calf stretch  3x30\"        hamstring stretch 3x30\"        SLR 2x10   X bridges x10      Side-lying hip abduction 2x10                 Standing           3 way hip  2x10 yellow //bar  X   marches x20    x   squats x10  Cues to increase weight bearing through L LE x   TG squats 10x2      Heel/toe raises 20x  Cues to avoid fwd/backward trunk lean  X   Slant board stretch 3x30\"                 Ladder play       Walking/marching 3x ea      Side stepping add             Other:       Specific Instructions for next treatment: progress L knee ROM, increase L knee/hip strength, progress balance to normalize gait, education in safe lifting/carrying techniques       Assessment: [x] Progressing toward goals. Initiated PT session on Nustep to promote blood flow and tissue extensibility. Pt was able to perform standing marches at parallel bar without BUE assist.  Verbal and tactile cues was provided to promote standing upright posture and engaging core when performing 3 ways hip and heel/toe raises. Pt demo improved single leg balance with standing marches this date. Pt demo improved L knee flexion AROM to 135° after completing L heel slide with strap this date. Pt denied vaso and stated will ice at home after therapy. [] No change. [] Other:    [x] Patient would benefit from skilled therapy to improve L knee ROM, increase L LE strength, progress balance, and normalize gait to return to PLOF. STG: (to be met in 7 treatments)  1. ? Pain: Pt will report decreased pain 3/10 or less to improve sitting tolerance to 20 minutes   2. ? ROM: Pt will increase L knee flexion from 95 to 105 or higher to minimize gait impairments   3. ? Strength: Pt will demonstrate improved L knee strength to 4+/5 and hip strength 4/5 or higher   4. ? Function: Pt will ascend/descend 1 step independently with no UE support for max home safety   5. Independent with Home Exercise Programs  6. Pt will report understanding of fall risk prevention strategies      LTG: (to be met in 14 treatments)  1.  Pt will demonstrate L knee extension to 0, and flexion to 115 or higher to normalize gait   2. Pt will ambulate 200 ft with no AD demonstrating no gait impairments to return to community participation   3. Pt will demonstrate L knee strength 5/5 and L hip strength 4+/5 to perform cooking/cleaning duties independently  4. Pt will report decreased functional impairment via LEFI from 33/80 to 45/80 to return to PLOF  5. Pt will demonstrate improved balance evident by SLS of L LE to 10 seconds or more to decrease risk of falls                     Patient goals: walk normally     Pt. Education:  [x] Yes  [] No  [x] Reviewed Prior HEP/Ed  Method of Education: [x] Verbal  [x] Demo  [] Written  Comprehension of Education:  [x] Verbalizes understanding. [x] Demonstrates understanding. [] Needs review. [] Demonstrates/verbalizes HEP/Ed previously given. Plan: [x] Continue per plan of care. [] Other:      Treatment Charges: Mins Units   []  Modalities     [x]  Ther Exercise 39 3   []  Manual Therapy     []  Ther Activities     []  Aquatics     []  Neuromuscular     [] Vasocompression     [] Gait Training     [] Dry needling        [] 1 or 2 muscles        [] 3 or more muscles     []  Other     Total Treatment time 39 3     Time In: 10:00am           Time Out: 10:45am    Electronically signed by:   Renay Anguiano, PT

## 2022-03-01 ENCOUNTER — HOSPITAL ENCOUNTER (OUTPATIENT)
Dept: PHYSICAL THERAPY | Age: 67
Setting detail: THERAPIES SERIES
Discharge: HOME | End: 2022-03-01
Payer: MEDICARE

## 2022-03-01 PROCEDURE — 97110 THERAPEUTIC EXERCISES: CPT

## 2022-03-01 NOTE — FLOWSHEET NOTE
ea leg Red  3/1-inc resistance; 1UE support  X    marches x20       squats x10  Cues to increase weight bearing through L LE    TG squats 10x2      Heel/toe raises 20x  Cues to avoid fwd/backward trunk lean     Slant board stretch 3x30\"   X               Ladder/Connie play       Walking fwd/back 4x ea ladder Added 3/1  X    Side stepping 4x ea  ladder Added 3/1 X    Marching fwd/lat 4x ea connie Added 3/1 X    Other:       Specific Instructions for next treatment: progress L knee ROM, increase L knee/hip strength, progress balance to normalize gait, education in safe lifting/carrying techniques       Assessment: [x] Progressing toward goals. [] No change. [] Other:    [x] Patient would benefit from skilled therapy to improve L knee ROM, increase L LE strength, progress balance, and normalize gait to return to PLOF. 3/1-Initiated PT session on NuStep for 5 mins to promote tissue flexibility and blood flow to BLE. Pt was able to tolerate progressive therex program without c/o increased L knee pain. Able to increase reps/resistance of various exs to cont progressing strength and mobility of LEs. Intermittent verbal and tactile cues provided to promote TrA recruitment and promote upright posture with standing 3 ways hip and avoid looking down at legs and ER LLE to clear foot to promote L hip/knee flexion to clear foot through L swing with connie/ladder play. STG: (to be met in 7 treatments)  1. ? Pain: Pt will report decreased pain 3/10 or less to improve sitting tolerance to 20 minutes   2. ? ROM: Pt will increase L knee flexion from 95 to 105 or higher to minimize gait impairments   3. ? Strength: Pt will demonstrate improved L knee strength to 4+/5 and hip strength 4/5 or higher   4. ? Function: Pt will ascend/descend 1 step independently with no UE support for max home safety   5. Independent with Home Exercise Programs  6.  Pt will report understanding of fall risk prevention strategies      LTG: (to be met in 14 treatments)  1. Pt will demonstrate L knee extension to 0, and flexion to 115 or higher to normalize gait   2. Pt will ambulate 200 ft with no AD demonstrating no gait impairments to return to community participation   3. Pt will demonstrate L knee strength 5/5 and L hip strength 4+/5 to perform cooking/cleaning duties independently  4. Pt will report decreased functional impairment via LEFI from 33/80 to 45/80 to return to PLOF  5. Pt will demonstrate improved balance evident by SLS of L LE to 10 seconds or more to decrease risk of falls                     Patient goals: walk normally     Pt. Education:  [x] Yes  [] No  [x] Reviewed Prior HEP/Ed  Method of Education: [x] Verbal  [x] Demo  [] Written  Comprehension of Education:  [x] Verbalizes understanding. [x] Demonstrates understanding. [] Needs review. [] Demonstrates/verbalizes HEP/Ed previously given. Plan: [x] Continue per plan of care. [] Other:      Treatment Charges: Mins Units   []  Modalities     [x]  Ther Exercise 33 2   []  Manual Therapy     []  Ther Activities     []  Aquatics     []  Neuromuscular     [] Vasocompression     [] Gait Training     [] Dry needling        [] 1 or 2 muscles        [] 3 or more muscles     []  Other     Total Treatment time 33 2     Time In: 10:30am           Time Out: 11:10am    Electronically signed by:   Renay Thomason PT

## 2022-03-03 ENCOUNTER — HOSPITAL ENCOUNTER (OUTPATIENT)
Dept: PHYSICAL THERAPY | Age: 67
Setting detail: THERAPIES SERIES
Discharge: HOME | End: 2022-03-03
Payer: MEDICARE

## 2022-03-03 PROCEDURE — 97110 THERAPEUTIC EXERCISES: CPT

## 2022-03-03 NOTE — FLOWSHEET NOTE
[address] [facility]   [address]  [facility] [address]   Phone: [phone]   Fax: [phone]    Physical Therapy Daily Treatment Note      Date:  3/3/2022  Patient Name:  Shelby Child    :  1955  MRN: [id number]  Physician: Paola Lambert MD                                 Insurance: Medicare  # of visits allowed/remaining: follow medicare guidelines / 60/60 combined with OT and Speech. [facility]  Medical Diagnosis:   [id number] (ICD-10-CM) - Status post revision of total replacement of left knee       Rehab Codes: M 25.56, M 25.55, R25 pain , M25.60 stiffness, R53.1 weakness  Onset date: 22                        Next 's appt. : 3/21 Melanie   Visit Count:                                 Cancel/No Show: 0/0  Re-eval by: visit 10 or 3/16     Subjective:  Patient reporting that her sciatica has been bothering her lately along with pain in the shin and ankle.   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)    Exercises:  Exercise Reps/ Time Weight/ Level Comments completed 3/3/2022     NuStep 5   3 Inc resistance 3/3 X   Supine          Quad sets  20x 3\"   Bolster under ankle    Heel slides w/ strap 2x10 Hold 3\" : KF AROM = 135°    Calf stretch  3x30\"        hamstring stretch 3x30\"        SLR 2x10      bridges x10      Side-lying hip abduction 2x10                 Standing           3 way hip  15x ea leg Red  33 inc reps X    marches x20   Add foam next session X   squats 10x2  Cues to increase weight bearing through L LE; BUE support needed X   TG squats 10x2      Heel/toe raises 20x  Cues to avoid fwd/backward trunk lean  X   Slant board stretch 3x30\"   X    Hip flexor stretch 2x30\" 8\"  X   Hamstring stretch on step 3x 30\"  8\"   X   Step ups fwd/lat 10x 6\" No UE support; one hand finger tip support lateral X   Ladder/Connie play       Walking fwd/back 4x ea ladder Added 3/1     Side stepping 4x ea  ladder Added 3/1    Marching fwd/lat 4x ea connie Added 3/1    Other:       Specific Instructions for next treatment: progress L knee ROM, increase L knee/hip strength, progress balance to normalize gait, education in safe lifting/carrying techniques       Assessment: [x] Progressing toward goals. Began session on NuStep to continue with ROM and strengthening. Progressed resistance this session to level 3. Followed with standing exercises to improve strength and stability to improve gait and balance. Patient reported tightness in B quads post squats and added hip flexor stretch in standing this session. Patient continues to need cues during squats for equal WB during exercise. Added step ups fwd and lateral with minimal UE support needed with exercise. Patient tolerated overall session well reporting fatigue at the end of session. Patient declined vaso this session stating she will ice at home. [] No change. [] Other:    [x] Patient would benefit from skilled therapy to improve L knee ROM, increase L LE strength, progress balance, and normalize gait to return to PLOF. STG: (to be met in 7 treatments)  1. ? Pain: Pt will report decreased pain 3/10 or less to improve sitting tolerance to 20 minutes   2. ? ROM: Pt will increase L knee flexion from 95 to 105 or higher to minimize gait impairments   3. ? Strength: Pt will demonstrate improved L knee strength to 4+/5 and hip strength 4/5 or higher   4. ? Function: Pt will ascend/descend 1 step independently with no UE support for max home safety   5. Independent with Home Exercise Programs  6.  Pt will report understanding of fall risk prevention strategies      LTG: (to be met in 14 treatments)  1. Pt will demonstrate L knee extension to 0, and flexion to 115 or higher to normalize gait   2. Pt will ambulate 200 ft with no AD demonstrating no gait impairments to return to community participation   3. Pt will demonstrate L knee strength 5/5 and L hip strength 4+/5 to perform cooking/cleaning duties independently  4. Pt will report decreased functional impairment via LEFI from 33/80 to 45/80 to return to PLOF  5. Pt will demonstrate improved balance evident by SLS of L LE to 10 seconds or more to decrease risk of falls                     Patient goals: walk normally     Pt. Education:  [x] Yes  [] No  [x] Reviewed Prior HEP/Ed  Method of Education: [x] Verbal  [x] Demo  [] Written  Comprehension of Education:  [x] Verbalizes understanding. [x] Demonstrates understanding. [] Needs review. [] Demonstrates/verbalizes HEP/Ed previously given. Plan: [x] Continue per plan of care.    [] Other:      Treatment Charges: Mins Units   []  Modalities     [x]  Ther Exercise 45 3   []  Manual Therapy     []  Ther Activities     []  Aquatics     []  Neuromuscular     [] Vasocompression     [] Gait Training     [] Dry needling        [] 1 or 2 muscles        [] 3 or more muscles     []  Other     Total Treatment time 45 3     Time In: 0930       Time Out: 0951    Electronically signed by:  Randa Morfin PTA

## 2022-03-08 ENCOUNTER — HOSPITAL ENCOUNTER (OUTPATIENT)
Dept: PHYSICAL THERAPY | Age: 67
Setting detail: THERAPIES SERIES
Discharge: HOME | End: 2022-03-08
Payer: MEDICARE

## 2022-03-08 PROCEDURE — 97110 THERAPEUTIC EXERCISES: CPT

## 2022-03-08 NOTE — PROGRESS NOTES
[facility]   [address] [address]   Phone: [phone]   Fax: [phone]    Physical Therapy Daily Treatment Note/Progress Note      Date:  3/8/2022  Patient Name:  Gallito Yoon    :  1955  MRN: [id number]  Physician: Glo Umaña MD                                 Insurance: Medicare  # of visits allowed/remaining: follow medicare guidelines / 60/60 combined with OT and Speech. [employer]ARD [employer]. AuthKenard Goodell  Medical Diagnosis:   [id number] (ICD-10-CM) - Status post revision of total replacement of left knee       Rehab Codes: M 25.56, M 25.55, R25 pain , M25.60 stiffness, R53.1 weakness  Onset date: 22                        Next Dr's appt. : 3/21 Melanie   Visit Count:                                  Cancel/No Show: 0/0  Re-eval completed 3/8/22  Next Re-eval by: visit 14 or     Subjective:    Pain:  [] Yes  [x] No Location: Pain Rating: (0-10 scale):   Pain altered Tx:  [] No  [] Yes  Action:  Comments: Pt arrived to PT session with c/o muscle soreness due to overdid squatting (x25) yesterday because pt noted did not do any HEP on . Pt reported has been able to complete HEP on the daily basis without any issue.        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)    Exercises:  Exercise Reps/ Time Weight/ Level Comments completed 3/8/2022     NuStep 5   3 Inc resistance 3/3    Supine          Quad sets  20x 3\"   Bolster under ankle    Heel slides w/ strap 2x10 Hold 3\" 3/8 L KF AROM = 128°  X    Calf stretch  3x30\"        hamstring stretch 3x30\"        SLR 2x10   X    Bridges 10x    X    Side-lying hip abduction 2x10      L SAQ  20x5s  Added 3/8- bolster under knee X                      Standing           3 way hip  15x ea leg Red  3/3 inc reps    marches x20   Add foam next session    squats 10x2  Cues to increase weight bearing through L LE; BUE support needed    TG squats 10x2      Heel/toe raises 20x  Cues to avoid fwd/backward trunk lean     Slant board stretch 3x30\"      Hip flexor stretch 2x30\" 8\"     Hamstring stretch on step 3x 30\"  8\"      L TKE 2x10  Red TB Added 3/8 X    Step up L single leg 2x10 8in Added 3/8 X                         Step ups fwd/lat 10x 6\" No UE support; one hand finger tip support lateral    Ladder/Connie play       Walking fwd/back 4x ea ladder Added 3/1     Side stepping 4x ea  ladder Added 3/1    Marching fwd/lat 4x ea connie Added 3/1                         Gait training    Added 3/8-200ft w/ cues for heel to toe and equal WB on BLE, no support needed  Slow gait speed noted  x          Other:          Specific Instructions for next treatment: progress L knee ROM, increase L knee/hip strength, progress balance to normalize gait, education in safe lifting/carrying techniques       Assessment: [x] Progressing toward goals. Pt was able to tolerate progressive therex program this date without c/o increased L knee pain. Pt required verbal and tactile cues for proper L quad activation w/ SAQ and SLR and correct technique with L TKE with band without fwd/backward trunk lean compensation. Added L single leg step up on 8in steps and L TKE with red TB to improve L KE AROM. Added gait training into POC this date to focus on ambulating with heel-to-toe gait and equal WB on BLE this date. Re-assessment for STG this date at 7 visits and patient have met all STG this date with L knee flexion AROM improved to 128° after completing 20x heel slide and L knee flexion/ext strength have also improved compared to 1st visit. Will cont with progressive therex program and HEP to maximize rehab potential and re-assess LTG at visit 14 for further improvement on L knee rehab progression.      [] No change     [] Other:    [x] Patient would benefit from skilled therapy to improve L knee ROM, increase L LE strength, progress balance, and normalize gait to return to PLOF. Re-assessment completed on 3/8/2022 by An Clayton Olea PT, DPT    ROM  ° A/P STRENGTH     Left Right Left Right   Hip Flex         Ext         ER         IR         ABD         ADD           Knee Flex 128°    5/5 5/5   Ext -2°   4+/5 5/5   Ankle DF     5/5 5/5   PF     4+/5     INV     4+/5     EVER     4+/5                         STG: (to be met in 7 treatments)  1. ? Pain: Pt will report decreased pain 3/10 or less to improve sitting tolerance to 20 minutes - MET 3/8/22  2. ? ROM: Pt will increase L knee flexion from 95 to 105 or higher to minimize gait impairments - MET 3/8/22  3. ? Strength: Pt will demonstrate improved L knee strength to 4+/5 and hip strength 4/5 or higher - MET 3/8/22  4. ? Function: Pt will ascend/descend 1 step independently with no UE support for max home safety - MET 3/8/22  5. Independent with Home Exercise Programs - MET 3/8/22  6. Pt will report understanding of fall risk prevention strategies - MET 3/8/22     LTG: (to be met in 14 treatments)  1. Pt will demonstrate L knee extension to 0, and flexion to 115 or higher to normalize gait   2. Pt will ambulate 200 ft with no AD demonstrating no gait impairments to return to community participation   3. Pt will demonstrate L knee strength 5/5 and L hip strength 4+/5 to perform cooking/cleaning duties independently  4. Pt will report decreased functional impairment via LEFI from 33/80 to 45/80 to return to PLOF  5. Pt will demonstrate improved balance evident by SLS of L LE to 10 seconds or more to decrease risk of falls                     Patient goals: walk normally     Pt. Education:  [x] Yes  [] No  [x] Reviewed Prior HEP/Ed  Method of Education: [x] Verbal  [x] Demo  [] Written  Comprehension of Education:  [] Verbalizes understanding. [] Demonstrates understanding. [] Needs review. [x] Demonstrates/verbalizes HEP/Ed previously given.      Plan: [x] Continue per plan of care. Added Gait Training into POC this date 3/8/2022. [] Other:      Treatment Charges: Mins Units   []  Modalities     [x]  Ther Exercise 40 3   []  Manual Therapy     []  Ther Activities     []  Aquatics     []  Neuromuscular     [] Vasocompression     [x] Gait Training 5 --   [] Dry needling        [] 1 or 2 muscles        [] 3 or more muscles     []  Other     Total Treatment time 39 3   Re-assessment for STG is billed into therex this date 3/8/22    Time In: 12:30pm       Time Out: 1:15pm    Electronically signed by:   Renay Ferrera, PT

## 2022-03-10 ENCOUNTER — HOSPITAL ENCOUNTER (OUTPATIENT)
Dept: PHYSICAL THERAPY | Age: 67
Setting detail: THERAPIES SERIES
Discharge: HOME | End: 2022-03-10
Payer: MEDICARE

## 2022-03-10 PROCEDURE — 97110 THERAPEUTIC EXERCISES: CPT

## 2022-03-10 NOTE — PROGRESS NOTES
[facility]   [address] [facility] [address]   Phone: [phone]   Fax: [phone]    Physical Therapy Daily Treatment Note      Date:  3/10/2022  Patient Name:  Nicola Cole    :  1955  MRN: [id number]  Physician: Arielle Robledo MD                                 Insurance: Medicare  # of visits allowed/remaining: follow medicare guidelines / 60/60 combined with OT and Speech. [employer]. Auth[employer]  Medical Diagnosis:   [id number] (ICD-10-CM) - Status post revision of total replacement of left knee       Rehab Codes: M 25.56, M 25.55, R25 pain , M25.60 stiffness, R53.1 weakness  Onset date: 22                        Next 's appt. : 3/21 Melanie   Visit Count:                                  Cancel/No Show: 0/0  Re-eval completed 3/8/22  Next Re-eval by: visit 14 or     Subjective:    Pain:  [x] Yes  [] No Location: L mcneil  Pain Rating: (0-10 scale): 3/10  Pain altered Tx:  [] No  [] Yes  Action:    Comments: Pt arrived to PT session 7 mins late due to forgetting appt time with c/o L shin pain rated 3/10. Pt reported went to "[employer] last night and had to unload heavy grocery off car and felt a little sore afterward. Pt reported after last session pt noted some muscle soreness in L gluts area. Pt stated cont to be complaint with HEP and able to complete various exs despite muscle soreness from last therapy session.        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)    Exercises:  Exercise Reps/ Time Weight/ Level Comments completed 3/10/2022     NuStep 5   3 Inc resistance 3/3    Supine          Quad sets  20x 3\"   Bolster under ankle    Heel slides w/ strap 2x10 Hold 3\" 3/8 L KF AROM = 128°  x   Calf stretch  3x30\"        hamstring stretch 3x30\"        SLR 2x10      Bridges 10x       SL hip abduction 2x10 L SAQ  20x5s 1#  3/10-added 1# cuff wt  Added 3/8- bolster under knee x                     Standing           3 way hip  15x ea leg Red  3/3 inc reps  1UE support  x   marches x20   Add foam next session    squats 10x2  Cues to increase weight bearing through L LE; BUE support needed    TG squats 10x2      Heel/toe raises 20x  Cues to avoid fwd/backward trunk lean     Slant board stretch 3x30\"      Hip flexor stretch 2x30\" 8\"     Hamstring stretch on step 3x 30\"  8\"      L TKE 2x10  Red TB Added 3/8    Step up L single leg 2x10 8in Added 3/8                         Step ups fwd/lat 10x 6\" 3/10-no UE support x   Ladder/Connie play       Walking fwd/back 4x ea ladder 3/10-2x fwd; 2x back  x   Side stepping 4x  ladder 3/10-2x step-to into square; 2x cross-stepping x   Marching fwd/lat 4x ea connie Added 3/1                         Gait training    Added 3/8-200ft w/ cues for heel to toe and equal WB on BLE, no support needed  Slow gait speed noted            Other:        Specific Instructions for next treatment: progress L knee ROM, increase L knee/hip strength, progress balance to normalize gait, education in safe lifting/carrying techniques       Assessment: [x] Progressing toward goals. Pt was able to complete various therex program this date despite limited time. Intermittent cues provided to promote upright posture and engage core stabilizers with 3 ways hip. Pt cont to require 1UE support with 3 ways hip however demo good recall with exs per chart above. Pt demo improved L quad control with step up onto 6in step without requiring UEs support and no knee valgus noted. Pt reported quad muscle soreness after completing therex program and was advise to ice at home at the end of the day for 10-20 mins to manage pain and inflammation.      [] No change     [] Other:    [x] Patient would benefit from skilled therapy to improve L knee ROM, increase L LE strength, progress balance, and normalize gait to return to PLOF.     Re-assessment completed on 3/8/2022 by Rneay Cardenas PT, DPT    ROM  ° A/P STRENGTH     Left Right Left Right   Hip Flex         Ext         ER         IR         ABD         ADD           Knee Flex 128°    5/5 5/5   Ext -2°   4+/5 5/5   Ankle DF     5/5 5/5   PF     4+/5     INV     4+/5     EVER     4+/5                         STG: (to be met in 7 treatments)  1. ? Pain: Pt will report decreased pain 3/10 or less to improve sitting tolerance to 20 minutes - MET 3/8/22  2. ? ROM: Pt will increase L knee flexion from 95 to 105 or higher to minimize gait impairments - MET 3/8/22  3. ? Strength: Pt will demonstrate improved L knee strength to 4+/5 and hip strength 4/5 or higher - MET 3/8/22  4. ? Function: Pt will ascend/descend 1 step independently with no UE support for max home safety - MET 3/8/22  5. Independent with Home Exercise Programs - MET 3/8/22  6. Pt will report understanding of fall risk prevention strategies - MET 3/8/22     LTG: (to be met in 14 treatments)  1. Pt will demonstrate L knee extension to 0, and flexion to 115 or higher to normalize gait   2. Pt will ambulate 200 ft with no AD demonstrating no gait impairments to return to community participation   3. Pt will demonstrate L knee strength 5/5 and L hip strength 4+/5 to perform cooking/cleaning duties independently  4. Pt will report decreased functional impairment via LEFI from 33/80 to 45/80 to return to PLOF  5. Pt will demonstrate improved balance evident by SLS of L LE to 10 seconds or more to decrease risk of falls                     Patient goals: walk normally     Pt. Education:  [x] Yes  [] No  [x] Reviewed Prior HEP/Ed  Method of Education: [x] Verbal  [x] Demo  [] Written  Comprehension of Education:  [] Verbalizes understanding. [] Demonstrates understanding. [] Needs review. [x] Demonstrates/verbalizes HEP/Ed previously given. Plan: [x] Continue per plan of care.   Added Gait Training into POC this date 3/8/2022. [] Other:      Treatment Charges: Mins Units   []  Modalities     [x]  Ther Exercise 40 3   []  Manual Therapy     []  Ther Activities     []  Aquatics     []  Neuromuscular     [] Vasocompression     [] Gait Training     [] Dry needling        [] 1 or 2 muscles        [] 3 or more muscles     []  Other     Total Treatment time 40 3       Time In: 11:23pm       Time Out: 12:05pm    Electronically signed by:   Renay Murphy PT

## 2022-03-15 ENCOUNTER — HOSPITAL ENCOUNTER (OUTPATIENT)
Dept: PHYSICAL THERAPY | Age: 67
Setting detail: THERAPIES SERIES
Discharge: HOME | End: 2022-03-15
Payer: MEDICARE

## 2022-03-15 PROCEDURE — 97110 THERAPEUTIC EXERCISES: CPT

## 2022-03-15 NOTE — FLOWSHEET NOTE
[facility]   [address] [place], [place] [address] [facility] [address]   Phone: [phone]   Fax: [phone]    Physical Therapy Daily Treatment Note      Date:  3/15/2022  Patient Name:  Jayme Power    :  1955  MRN: [id number]  Physician: Roberta Shafer MD                                 Insurance: Medicare  # of visits allowed/remaining: follow medicare guidelines / 60/60 combined with OT and Speech. [employer]ARD [employer]. Jacoby White  Medical Diagnosis:   [id number] (ICD-10-CM) - Status post revision of total replacement of left knee       Rehab Codes: M 25.56, M 25.55, R25 pain , M25.60 stiffness, R53.1 weakness  Onset date: 22                        Next Dr's appt. 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)    Exercises:  Exercise Reps/ Time Weight/ Level Comments completed 3/15/2022     NuStep 5   5 Inc resistance 3/15 x   Supine          Quad sets  20x 3\"   Bolster under ankle    Heel slides w/ strap 2x10 Hold 3\" 3/8 L KF AROM = 128°     Calf stretch  3x30\"        hamstring stretch 3x30\"        SLR 2x10      Bridges 10x       SL hip abduction 2x10      L SAQ  20x5s 1#  3/10-added 1# cuff wt  Added 3/8- bolster under knee                      Standing           3 way hip  15x ea leg Red  3/3 inc reps  1UE support     marches x20   Add foam next session    squats 10x2  Cues to increase weight bearing through L LE; BUE support needed    TG squats 10x2      Heel/toe raises 20x  Cues to avoid fwd/backward trunk lean     Knee flexion stretch on step  3x 30\" 8\"  x   Slant board stretch 3x30\"   x   Hip flexor stretch 2x30\" 8\"     Hamstring stretch on step 3x 30\"  8\"   x   L TKE 2x10  Red TB Added 3/8    Step ups fwd/lat 10x 8\" Inc step height x   Seated       BATCA LAQ  10x2 10#  x   Christell Efraín 10x2 10# LLE only  x          Ladder/Connie play       Walking fwd/back 4x ea ladder 3/10-2x fwd; 2x back     Side stepping 4x  ladder 3/10-2x step-to into square; 2x cross-stepping    Marching fwd/lat 4x ea connie Added 3/1                         Gait training    Added 3/8-200ft w/ cues for heel to toe and equal WB on BLE, no support needed  Slow gait speed noted            Other:        Specific Instructions for next treatment: progress L knee ROM, increase L knee/hip strength, progress balance to normalize gait, education in safe lifting/carrying techniques       Assessment: [x] Progressing toward goals. 3/15: Was able to progress step height this session with step ups with mild difficulty noted at the beginning, but with cueing patient was able to increase control of ascend and descend. Added BATCA LAQ and [address] to inc BLE strength. Cues as needed to stay on task. Patient reporting mild soreness at the end of session. [] No change     [] Other:    [x] Patient would benefit from skilled therapy to improve L knee ROM, increase L LE strength, progress balance, and normalize gait to return to PLOF.      Re-assessment completed on 3/8/2022 by An Shelby Chun PT, DPT    ROM  ° A/P STRENGTH     Left Right Left Right   Hip Flex         Ext         ER         IR         ABD         ADD           Knee Flex 128°    5/5 5/5   Ext -2°   4+/5 5/5   Ankle DF     5/5 5/5   PF     4+/5     INV     4+/5     EVER     4+/5                         STG: (to be met in 7 treatments)  1. ? Pain: Pt will report decreased pain 3/10 or less to improve sitting tolerance to 20 minutes - MET 3/8/22  2. ? ROM: Pt will increase L knee flexion from 95 to 105 or higher to minimize gait impairments - MET 3/8/22  3. ? Strength: Pt will demonstrate improved L knee strength to 4+/5 and hip strength 4/5 or higher - MET 3/8/22  4. ? Function: Pt will ascend/descend 1 step independently with no UE support for max home safety - MET 3/8/22  5. Independent with Home Exercise Programs - MET 3/8/22  6. Pt will report understanding of fall risk prevention strategies - MET 3/8/22     LTG: (to be met in 14 treatments)  1. Pt will demonstrate L knee extension to 0, and flexion to 115 or higher to normalize gait   2. Pt will ambulate 200 ft with no AD demonstrating no gait impairments to return to community participation   3. Pt will demonstrate L knee strength 5/5 and L hip strength 4+/5 to perform cooking/cleaning duties independently  4. Pt will report decreased functional impairment via LEFI from 33/80 to 45/80 to return to PLOF  5. Pt will demonstrate improved balance evident by SLS of L LE to 10 seconds or more to decrease risk of falls                     Patient goals: walk normally     Pt. Education:  [x] Yes  [] No  [x] Reviewed Prior HEP/Ed  Method of Education: [x] Verbal  [x] Demo  [] Written  Comprehension of Education:  [] Verbalizes understanding. [] Demonstrates understanding. [] Needs review. [x] Demonstrates/verbalizes HEP/Ed previously given. Plan: [x] Continue per plan of care. Added Gait Training into POC this date 3/8/2022.    [] Other:      Treatment Charges: Mins Units   []  Modalities     [x]  Ther Exercise 40 3   []  Manual Therapy     []  Ther Activities     []  Aquatics     []  Neuromuscular     [] Vasocompression     [] Gait Training     [] Dry needling        [] 1 or 2 muscles        [] 3 or more muscles     []  Other     Total Treatment time 40 3       Time In: 0825       Time Out: 0900    Electronically signed by:  Ortiz Darnell PTA

## 2022-03-17 ENCOUNTER — HOSPITAL ENCOUNTER (OUTPATIENT)
Dept: PHYSICAL THERAPY | Age: 67
Setting detail: THERAPIES SERIES
Discharge: HOME | End: 2022-03-17
Payer: MEDICARE

## 2022-03-17 PROCEDURE — 97110 THERAPEUTIC EXERCISES: CPT

## 2022-03-17 NOTE — FLOWSHEET NOTE
[facility]   [address] [facility] [address]   Phone: [phone]   Fax: [phone]    Physical Therapy Daily Treatment Note      Date:  3/17/2022  Patient Name:  Stu Moreno    :  1955  MRN: [id number]  Physician: Cliff Lovell MD                                 Insurance: Medicare  # of visits allowed/remaining: follow medicare guidelines / 60/60 combined with OT and Speech. HARD MAX. AuthTomma Dony  Medical Diagnosis:   [id number] (ICD-10-CM) - Status post revision of total replacement of left knee       Rehab Codes: M 25.56, M 25.55, R25 pain , M25.60 stiffness, R53.1 weakness  Onset date: 22                        Next 's appt. : 3/21 Melanie   Visit Count: 10/14                                 Cancel/No Show: 0/0  Re-eval completed 3/8/22  Next Re-eval by: visit 14 or     Subjective:  Patient reporting that she may have over done it yesterday as she helped her family to finish putting the roof on the pergola. She reports she also did some gardening after finishing the pergola.   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)    Exercises:  Exercise Reps/ Time Weight/ Level Comments completed 3/17/2022     NuStep 5   5 Inc resistance 3/15 x   Supine          Quad sets  20x 3\"   Bolster under ankle    Heel slides w/ strap 2x10 Hold 3\" 3/8 L KF AROM = 128°     Calf stretch  3x30\"        hamstring stretch 3x30\"        SLR 2x10      Bridges 10x       SL hip abduction 2x10      L SAQ  20x5s 1#  3/10-added 1# cuff wt  Added 3/8- bolster under knee                      Standing           3 way hip  15x ea leg Red  1 UE support X   marches x20   Add foam next session    squats 10x2 Cues to increase weight bearing through L LE; BUE support needed    TG squats 10x2      Heel/toe raises 20x  Cues to avoid fwd/backward trunk lean     Knee flexion stretch on step  3x 30\" 8\"  x   Slant board stretch 3x30\"      Hip flexor stretch 2x30\" 8\"     Hamstring stretch on step 3x 30\"  8\"   x   L TKE 2x10  Red TB Added 3/8    Step ups fwd/lat 10x 8\" Inc step height x   Tandem stance       SLS 2x 30\" LLE only Occasional finger tip assistance needed. x   Heel taps 10x2  10x 2\"  4\" Finger tip support as needed for increase control X  x          Seated       BATCA LAQ  10x2 10# 10x only this date x   BATCA HSC 10x2 10# LLE only ; 10x only this date x          Ladder/Connie play       Walking fwd/back 4x ea ladder 3/10-2x fwd; 2x back     Side stepping 4x  ladder 3/10-2x step-to into square; 2x cross-stepping    Marching fwd/lat 4x ea connie Added 3/1                         Gait training    Added 3/8-200ft w/ cues for heel to toe and equal WB on BLE, no support needed  Slow gait speed noted            Other:        Specific Instructions for next treatment: progress L knee ROM, increase L knee/hip strength, progress balance to normalize gait, education in safe lifting/carrying techniques       Assessment: [x] Progressing toward goals. 3/17: Added heel taps this session to improve knee strength and stability and added tandem and SLS to improve balance. Patient reporting inc pain with heel taps but states pain was tolerable and was able to complete exercise. Patient continues to demonstrate antalgic gait and slow ree. Patient declined vaso and states she will ice at home afte she goes grocery shopping. [] No change     [] Other:    [x] Patient would benefit from skilled therapy to improve L knee ROM, increase L LE strength, progress balance, and normalize gait to return to PLOF.      Re-assessment completed on 3/8/2022 by An Brannon Bergeron PT, DPT    ROM  ° A/P STRENGTH     Left Right Left Right   Hip Flex         Ext         ER         IR         ABD         ADD           Knee Flex 128°    5/5 5/5   Ext -2°   4+/5 5/5   Ankle DF     5/5 5/5   PF     4+/5     INV     4+/5     EVER     4+/5                         STG: (to be met in 7 treatments)  1. ? Pain: Pt will report decreased pain 3/10 or less to improve sitting tolerance to 20 minutes - MET 3/8/22  2. ? ROM: Pt will increase L knee flexion from 95 to 105 or higher to minimize gait impairments - MET 3/8/22  3. ? Strength: Pt will demonstrate improved L knee strength to 4+/5 and hip strength 4/5 or higher - MET 3/8/22  4. ? Function: Pt will ascend/descend 1 step independently with no UE support for max home safety - MET 3/8/22  5. Independent with Home Exercise Programs - MET 3/8/22  6. Pt will report understanding of fall risk prevention strategies - MET 3/8/22     LTG: (to be met in 14 treatments)  1. Pt will demonstrate L knee extension to 0, and flexion to 115 or higher to normalize gait   2. Pt will ambulate 200 ft with no AD demonstrating no gait impairments to return to community participation   3. Pt will demonstrate L knee strength 5/5 and L hip strength 4+/5 to perform cooking/cleaning duties independently  4. Pt will report decreased functional impairment via LEFI from 33/80 to 45/80 to return to PLOF  5. Pt will demonstrate improved balance evident by SLS of L LE to 10 seconds or more to decrease risk of falls                     Patient goals: walk normally     Pt. Education:  [x] Yes  [] No  [x] Reviewed Prior HEP/Ed  Method of Education: [x] Verbal  [x] Demo  [] Written  Comprehension of Education:  [] Verbalizes understanding. [] Demonstrates understanding. [] Needs review. [x] Demonstrates/verbalizes HEP/Ed previously given. Plan: [x] Continue per plan of care. Added Gait Training into POC this date 3/8/2022.    [] Other:      Treatment Charges: Mins Units   []  Modalities     [x]  Ther Exercise 41 3   []  Manual Therapy     []  Ther Activities     []  Aquatics     []  Neuromuscular     [] Vasocompression     [] Gait Training     [] Dry needling        [] 1 or 2 muscles        [] 3 or more muscles     []  Other     Total Treatment time 41 3       Time In: 0825       Time Out: 0901    Electronically signed by:  Randa Morfin PTA

## 2022-03-21 ENCOUNTER — OFFICE VISIT (OUTPATIENT)
Dept: ORTHOPEDIC SURGERY | Age: 67
End: 2022-03-21

## 2022-03-21 DIAGNOSIS — Z96.652 STATUS POST REVISION OF TOTAL REPLACEMENT OF LEFT KNEE: Primary | ICD-10-CM

## 2022-03-21 PROCEDURE — 99024 POSTOP FOLLOW-UP VISIT: CPT | Performed by: ORTHOPAEDIC SURGERY

## 2022-03-21 NOTE — PROGRESS NOTES
RETAIN Activity Prescription     [facility] [address]  [address]  Dept: [phone] 2421} assistive device.

## 2022-03-21 NOTE — PROGRESS NOTES
Halle Gonzalez returns today. 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 She says her knee pain is significantly improved. Physical examination notes her incisions pristine there is no redness or drainage whatsoever. Knee motion is 0 to about 120 degrees. She has good varus and valgus stability and still working on her strength. Patellar tracking is noted to be good as well. 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   We will see her back here in 2 months call if any problems prior to the time

## 2022-03-24 ENCOUNTER — HOSPITAL ENCOUNTER (OUTPATIENT)
Dept: PHYSICAL THERAPY | Age: 67
Setting detail: THERAPIES SERIES
Discharge: HOME | End: 2022-03-24
Payer: MEDICARE

## 2022-03-24 NOTE — FLOWSHEET NOTE
[x] [facility]) - [facility]  [address]  [place]: [phone]   F: [phone]     Physical Therapy Cancel/No Show note    Date: 3/24/2022  Patient: Swati Singh  : 1955  MRN: [id number]    Visit Count: 10/14  Cancels/No Shows to date:     For today's appointment patient:    [x]  Cancelled    [] Rescheduled appointment    [] No-show     Reason given by patient:    [x]  Patient ill- per , patient called and left a message stating she was ill and is not about to make it to therapy this morning      []  Conflicting appointment    [] No transportation      [] Conflict with work    [] No reason given    [] Weather related    [] [id number]    [] Other:      Comments:        [] Next appointment was confirmed    Electronically signed by: Marilin Calixto PTA

## 2022-03-25 ENCOUNTER — HOSPITAL ENCOUNTER (OUTPATIENT)
Dept: PHYSICAL THERAPY | Age: 67
Setting detail: THERAPIES SERIES
Discharge: HOME | End: 2022-03-25
Payer: MEDICARE

## 2022-03-25 ENCOUNTER — HOSPITAL ENCOUNTER (OUTPATIENT)
Dept: WOMENS IMAGING | Age: 67
Discharge: HOME | End: 2022-03-27
Payer: MEDICARE

## 2022-03-25 DIAGNOSIS — Z12.31 ENCOUNTER FOR SCREENING MAMMOGRAM FOR MALIGNANT NEOPLASM OF BREAST: ICD-10-CM

## 2022-03-25 PROCEDURE — 77063 BREAST TOMOSYNTHESIS BI: CPT

## 2022-03-25 PROCEDURE — 97110 THERAPEUTIC EXERCISES: CPT
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
## 2022-03-29 ENCOUNTER — HOSPITAL ENCOUNTER (OUTPATIENT)
Dept: PHYSICAL THERAPY | Age: 67
Setting detail: THERAPIES SERIES
Discharge: HOME | End: 2022-03-29
Payer: MEDICARE

## 2022-03-29 PROCEDURE — 97110 THERAPEUTIC EXERCISES: CPT

## 2022-03-29 NOTE — FLOWSHEET NOTE
[address] [facility]   [address] [facility] [address]   Phone: [phone]   Fax: [phone]    Physical Therapy Daily Treatment Note      Date:  3/29/2022  Patient Name:  Soledad Polo    :  1955  MRN: [id number]  Physician: Magali Zuleta MD                                 Insurance: Medicare  # of visits allowed/remaining: follow medicare guidelines / 60/60 combined with OT and Speech. HARD MAX. [facility]  Medical Diagnosis:   [id number] (ICD-10-CM) - Status post revision of total replacement of left knee       Rehab Codes: M 25.56, M 25.55, R25 pain , M25.60 stiffness, R53.1 weakness  Onset date: 22                        Next 's appt. : 3/21 Melanie   Visit Count:                                  Cancel/No Show: 1/0  Re-eval completed 3/8/22  Next Re-eval by: visit 14 or     Subjective:   Pain:  [x] Yes  [] No Location: L quad Pain Rating: (0-10 scale): 3-4/10  Pain altered Tx:  [] No  [] Yes  Action    Comments:  Pt arrived to physical therapy session with c/o L quad tightness rated at 3-4/10. Pt reported cont to be compliant with HEP and has been busy taking  to different [address] [place] [address] appointment.        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)    Exercises:  Exercise Reps/ Time Weight/ Level Comments completed 3/29/2022     NuStep 5   5 Inc resistance 3/15    Supine          Quad sets  20x 3\"   Bolster under ankle    Heel slides w/ strap 2x10 Hold 3\" 3/8 L KF AROM = 128°  x   Calf stretch  3x30\"        hamstring stretch 3x30\"        SLR 2x10      Bridges 10x       SL hip abduction 2x10      L SAQ  20x5s 1#  3/10-added 1# cuff wt  Added 3/8- bolster under knee                      Standing           3 way hip  15x ea leg green  B UE support inc TB resistance to green    marches x20   Add foam next session squats 10x2  Cues to increase weight bearing through L LE; BUE support needed    TG squats 10x2      Heel/toe raises 20x  Finger tips    Knee flexion stretch on step  3x 30\" 8\"     Slant board stretch 3x30\"  2x 30\" 3/25    Hip flexor stretch 2x30\" 8\"     Hamstring stretch on step 3x 30\"  8\"  2x 30\" 3/25    L TKE 2x10  Red TB Added 3/8    Step ups fwd/lat 10x 8\"     Tandem stance 2x 30\"      SLS 2x 30\" LLE only Occasional finger tip assistance needed. Heel taps 10x2  10x 2\"  4\" Finger tip support as needed for increase control           Seated       BATCA LAQ  10x2 10# 10x only this date    BATCA HSC 10x2 10# LLE only ; 10x only this date           Ladder/Connie play       Walking fwd/back 4x ea ladder 3/10-2x fwd; 2x back     Side stepping 4x  ladder 3/10-2x step-to into square; 2x cross-stepping    Marching fwd/lat 4x ea connie Added 3/1                         Gait training    Added 3/8-200ft w/ cues for heel to toe and equal WB on BLE, no support needed  Slow gait speed noted            Other:    3/25: LEFI score: 75%    Access Code: [email or web address]  URL: [email or web address]  Date: 03/25/2022  Prepared by: Alexandra Cobian    Exercises  Hip Extension with Resistance Loop - 1 x daily - 7 x weekly - 3 sets - 10 reps  Hip Abduction with Resistance Loop - 1 x daily - 7 x weekly - 3 sets - 10 reps  Standing Hip Flexion with Resistance Loop - 1 x daily - 7 x weekly - 3 sets - 10 reps  Standing Tandem Balance with Counter Support - 1 x daily - 7 x weekly - 3-5 reps - 30 sec hold  Single Leg Stance with Support - 1 x daily - 7 x weekly - 3-5 reps - 30 sec hold  Mini Squat with Counter Support - 1 x daily - 7 x weekly - 3 sets - 10 reps  Heel rises with counter support - 1 x daily - 7 x weekly - 3 sets - 10 reps  Standing Gastroc Stretch at Counter - 1 x daily - 7 x weekly - 3-5 reps - 30 sec hold  Standing Hamstring Stretch with Step - 1 x daily - 7 x weekly - 3-5 reps - 30 sec hold  Step Up - 1 x daily - 7 x weekly - 3 sets - 10 reps  Lateral Step Up - 1 x daily - 7 x weekly - 3 sets - 10 reps      Specific Instructions for next treatment: progress L knee ROM, increase L knee/hip strength, progress balance to normalize gait, education in safe lifting/carrying techniques       Assessment: [x] Progressing toward goals. Re-assessment to check all goals and overall function completed following heel slide exercise due to pt requesting to be discharge early this date. Pt demo improved L hip and knee strength overall as well as cont to demo improvement in L knee AROM 0-130°. Pt also reported has been able to increase tolerance with standing, walking, and stairs negotiation and reported only feeling of tightness in L quad which decrease after completing various HEP stretching exs. Pt also demo improved functional mobility as LEFI score improved from 33/80 to 60/80. Reviewed HEP and educated pt on cont with HEP after discharge to cont progressing LLE strength and mobility as well as preventing future injury and maximize rehab potential.  Pt verbalized understanding. Pt is ready to be discharge this date from physical therapy services. [] No change     [] Other:    [x] Patient would benefit from skilled therapy to improve L knee ROM, increase L LE strength, progress balance, and normalize gait to return to PLOF.        Re-assessment completed on 3/29/2022 by An Maria Vernon PT, DPT    ROM  ° A/P STRENGTH     Left Right Left Right   Hip Flex     5/5    Ext         ER         IR         ABD     5/5    ADD           Knee Flex 130°    5/5 5/5   Ext 0°   4+/5 5/5   Ankle DF     5/5 5/5   PF     4+/5     INV     4+/5     EVER     4+/5                        BALANCE Left  Right   Single Leg (Eyes Open) 15 15   Single Leg (Eyes Closed       Other: NBOS EC              Functional Assessment tool: LEFI  Functional Assessment score: 33/80 (58.76%)  Updated Score 3/29/22: 60/80 = 75% = 25% functionally impaired       STG: (to be met in 7 treatments)  1. ? Pain: Pt will report decreased pain 3/10 or less to improve sitting tolerance to 20 minutes - MET 3/8/22  2. ? ROM: Pt will increase L knee flexion from 95 to 105 or higher to minimize gait impairments - MET 3/8/22  3. ? Strength: Pt will demonstrate improved L knee strength to 4+/5 and hip strength 4/5 or higher - MET 3/8/22  4. ? Function: Pt will ascend/descend 1 step independently with no UE support for max home safety - MET 3/8/22  5. Independent with Home Exercise Programs - MET 3/8/22  6. Pt will report understanding of fall risk prevention strategies - MET 3/8/22     LTG: (to be met in 14 treatments) - Re-assessed on 3/29/22 by An Lida Gibson PT, DPT  1. Pt will demonstrate L knee extension to 0, and flexion to 115 or higher to normalize gait - MET  2. Pt will ambulate 200 ft with no AD demonstrating no gait impairments to return to community participation - MET   3. Pt will demonstrate L knee strength 5/5 and L hip strength 4+/5 to perform cooking/cleaning duties independently - MET   4. Pt will report decreased functional impairment via LEFI from 33/80 to 45/80 to return to PLOF -  MET  5. Pt will demonstrate improved balance evident by SLS of L LE to 10 seconds or more to decrease risk of falls - MET                     Patient goals: walk normally - MET     Pt. Education:  [x] Yes  [] No  [x] Reviewed Prior HEP/Ed  Method of Education: [x] Verbal  [x] Demo  [] Written  Comprehension of Education:  [] Verbalizes understanding. [] Demonstrates understanding. [] Needs review. [x] Demonstrates/verbalizes HEP/Ed previously given. Plan: [x] Continue per plan of care. Added Gait Training into POC this date 3/8/2022.    [] Other:      Treatment Charges: Mins Units   []  Modalities     [x]  Ther Exercise 30 2   []  Manual Therapy     []  Ther Activities     []  Aquatics     []  Neuromuscular     [] Vasocompression     [] Gait Training     [] Dry needling        [] 1 or 2 muscles        [] 3 or

## 2022-03-29 NOTE — DISCHARGE SUMMARY
[facility]   [address]   Phone: [phone]   Fax: [phone]        Physical Therapy Discharge Note    Date: 3/29/2022      Patient: Sherry Shaw  : 1955  MRN: Lay Reeves MD                                 Insurance: Medicare  # of visits allowed/remaining: follow medicare guidelines / 6060 combined with OT and Speech. [employer]. Auth: NPRe  Medical Diagnosis:   Z96.652 (ICD-10-CM) - Status post revision of total replacement of left knee       Rehab Codes: M 25.56, M 25.55, R25 pain , M25.60 stiffness, R53.1 weakness  Onset date: 22                        Next Dr's appt. : 3/21 Melanie   Visit Count:                                  Cancel/No Show:   Date of initial visit: 22               Date of final visit: 3/29/22    Subjective:   Pain:  [x]? Yes  []? No   Location: L quad         Pain Rating: (0-10 scale): 3-4/10  Pain altered Tx:  []? No  []? Yes  Action     Comments:  Pt arrived to physical therapy session with c/o L quad tightness rated at 3-4/10. Pt reported cont to be compliant with HEP and has been busy taking  to different Doctor's appointment.           Objective: Re-assessed on 3/29/2022 by An Rudi Singh PT, DPT            ROM  ° A/P STRENGTH     Left Right Left Right   Hip Flex     5/5     Ext           ER           IR           ABD     5/5     ADD           Knee Flex 130°    5/5 5/5   Ext 0°   4+/5 5/5   Ankle DF     5/5 5/5   PF     4+/5     INV     4+/5     EVER     4+/5                          BALANCE Left  Right   Single Leg (Eyes Open) 15 15   Single Leg (Eyes Closed       Other: NBOS EC                 Functional Assessment tool: LEFI  Functional Assessment score: 33/ (58.76%)  Updated Score 3/29/22: 60/80 = 75% = 25% functionally impaired            Assessment:  [x]? Progressing toward goals.   Re-assessment to check all goals and overall function completed following heel slide exercise due to pt requesting to be discharge early this date. Pt demo improved L hip and knee strength overall as well as cont to demo improvement in L knee AROM 0-130°. Pt also reported has been able to increase tolerance with standing, walking, and stairs negotiation and reported only feeling of tightness in L quad which decrease after completing various HEP stretching exs. Pt also demo improved functional mobility as LEFI score improved from 33/80 to 60/80. Reviewed HEP and educated pt on cont with HEP after discharge to cont progressing LLE strength and mobility as well as preventing future injury and maximize rehab potential.  Pt verbalized understanding. Pt is ready to be discharge this date from physical therapy services. []? No change                          []? Other:                          [x]? Patient would benefit from skilled therapy to improve L knee ROM, increase L LE strength, progress balance, and normalize gait to return to PLOF. STG: (to be met in 7 treatments)  1. ? Pain: Pt will report decreased pain 3/10 or less to improve sitting tolerance to 20 minutes - MET 3/8/22  2. ? ROM: Pt will increase L knee flexion from 95 to 105 or higher to minimize gait impairments - MET 3/8/22  3. ? Strength: Pt will demonstrate improved L knee strength to 4+/5 and hip strength 4/5 or higher - MET 3/8/22  4. ? Function: Pt will ascend/descend 1 step independently with no UE support for max home safety - MET 3/8/22  5. Independent with Home Exercise Programs - MET 3/8/22  6. Pt will report understanding of fall risk prevention strategies - MET 3/8/22     LTG: (to be met in 14 treatments) - Re-assessed on 3/29/22 by An Floyd Bran PT, DPT  1. Pt will demonstrate L knee extension to 0, and flexion to 115 or higher to normalize gait - MET  2.  Pt will ambulate 200 ft with no AD demonstrating no gait impairments to return to community participation - MET 3. Pt will demonstrate L knee strength 5/5 and L hip strength 4+/5 to perform cooking/cleaning duties independently - MET   4. Pt will report decreased functional impairment via LEFI from 33/80 to 45/80 to return to PLOF -  MET  5. Pt will demonstrate improved balance evident by SLS of L LE to 10 seconds or more to decrease risk of falls - MET                     Patient Kelly neumann - MET       Treatment to Date:  [x] Therapeutic Exercise    [] Modalities:  [] Therapeutic Activity    [] Ultrasound  [] Electrical Stimulation  [x] Gait Training     [] Massage       [] Lumbar/Cervical Traction  [] Neuromuscular Re-education [] Cold/hotpack [] Iontophoresis: 4 mg/mL  [] Instruction in Home Exercise Program                     Dexamethasone Sodium  [] Manual Therapy             Phosphate 40-80 mAmin  [] Aquatic Therapy                   [x] Vasocompression/    [] Other:             Game Ready    Discharge Status:     [x] Pt recovered from conditions. Treatment goals were met. [x] Pt received maximum benefit. No further therapy indicated at this time. [x] Pt to continue exercise/home instructions independently. Electronically signed by Renay Allen Mt, PT on 3/29/2022 at 10:14 AM      If you have any questions or concerns, please don't hesitate to call.   Thank you for your referral.

## 2022-03-31 ENCOUNTER — HOSPITAL ENCOUNTER (OUTPATIENT)
Dept: PHYSICAL THERAPY | Age: 67
Setting detail: THERAPIES SERIES
End: 2022-03-31
Payer: MEDICARE

## 2022-05-23 ENCOUNTER — OFFICE VISIT (OUTPATIENT)
Dept: ORTHOPEDIC SURGERY | Age: 67
End: 2022-05-23

## 2022-05-23 DIAGNOSIS — Z96.652 STATUS POST REVISION OF TOTAL REPLACEMENT OF LEFT KNEE: Primary | ICD-10-CM

## 2022-05-23 PROCEDURE — 99024 POSTOP FOLLOW-UP VISIT: CPT | Performed by: ORTHOPAEDIC SURGERY

## 2022-05-23 NOTE — PROGRESS NOTES
Johnson Dacosta returns today status post revision left total knee on 1/24/2022 for aseptic loosening of the tibial component she says her knee feels great. She does have cataract surgery today    Examination notes her knee motion is superb 0 135 degrees has excellent varus valgus stability and she has good patellar tracking. She has no pain with any of the motion. 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 Plan  Patient is very happy with her knee.   Continue home exercise program we will see her back here in January 2023 or call if she has problems prior to that time

## 2022-06-28 PROBLEM — F33.0 MILD EPISODE OF RECURRENT MAJOR DEPRESSIVE DISORDER (HCC): Status: ACTIVE | Noted: 2022-06-28

## 2023-07-05 ENCOUNTER — HOSPITAL ENCOUNTER (OUTPATIENT)
Dept: GENERAL RADIOLOGY | Facility: CLINIC | Age: 68
Discharge: HOME | End: 2023-07-07
Payer: MEDICARE

## 2023-07-05 ENCOUNTER — HOSPITAL ENCOUNTER (OUTPATIENT)
Facility: CLINIC | Age: 68
Discharge: HOME | End: 2023-07-07
Payer: MEDICARE

## 2023-07-05 DIAGNOSIS — S46.812A STRAIN OF LEFT TRAPEZIUS MUSCLE, INITIAL ENCOUNTER: ICD-10-CM

## 2023-07-05 DIAGNOSIS — R06.2 WHEEZING: ICD-10-CM

## 2023-07-05 PROCEDURE — 71046 X-RAY EXAM CHEST 2 VIEWS: CPT

## 2023-07-06 ENCOUNTER — HOSPITAL ENCOUNTER (OUTPATIENT)
Age: 68
Setting detail: SPECIMEN
Discharge: HOME | End: 2023-07-06

## 2023-07-06 DIAGNOSIS — K76.9 LIVER DISEASE, UNSPECIFIED: ICD-10-CM

## 2023-07-06 DIAGNOSIS — M79.10 MYALGIA: ICD-10-CM

## 2023-07-06 DIAGNOSIS — B18.2 CHRONIC HEPATITIS C WITHOUT HEPATIC COMA (HCC): ICD-10-CM

## 2023-07-06 DIAGNOSIS — R19.7 DIARRHEA, UNSPECIFIED TYPE: ICD-10-CM

## 2023-07-06 DIAGNOSIS — E78.2 MIXED HYPERLIPIDEMIA: ICD-10-CM

## 2023-07-06 DIAGNOSIS — R63.5 WEIGHT GAIN: ICD-10-CM

## 2023-07-06 DIAGNOSIS — D50.9 IRON DEFICIENCY ANEMIA, UNSPECIFIED IRON DEFICIENCY ANEMIA TYPE: ICD-10-CM

## 2023-07-06 LAB
25(OH)D3 SERPL-MCNC: 23.2 NG/ML
ALBUMIN SERPL-MCNC: 4.1 G/DL (ref 3.5–5.2)
ALBUMIN/GLOB SERPL: 1.5 {RATIO} (ref 1–2.5)
ALP SERPL-CCNC: 66 U/L (ref 35–104)
ALT SERPL-CCNC: 10 U/L (ref 5–33)
ANION GAP SERPL CALCULATED.3IONS-SCNC: 12 MMOL/L (ref 9–17)
AST SERPL-CCNC: 17 U/L
BASOPHILS # BLD: 0.06 K/UL (ref 0–0.2)
BASOPHILS NFR BLD: 1 % (ref 0–2)
BILIRUB SERPL-MCNC: 0.5 MG/DL (ref 0.3–1.2)
BUN SERPL-MCNC: 13 MG/DL (ref 8–23)
CALCIUM SERPL-MCNC: 9.3 MG/DL (ref 8.6–10.4)
CHLORIDE SERPL-SCNC: 103 MMOL/L (ref 98–107)
CHOLEST SERPL-MCNC: 174 MG/DL
CHOLESTEROL/HDL RATIO: 2.6
CK SERPL-CCNC: 45 U/L (ref 26–192)
CO2 SERPL-SCNC: 24 MMOL/L (ref 20–31)
CREAT SERPL-MCNC: 0.64 MG/DL (ref 0.5–0.9)
EOSINOPHIL # BLD: 0.16 K/UL (ref 0–0.44)
EOSINOPHILS RELATIVE PERCENT: 2 % (ref 1–4)
ERYTHROCYTE [DISTWIDTH] IN BLOOD BY AUTOMATED COUNT: 13.4 % (ref 11.8–14.4)
GFR SERPL CREATININE-BSD FRML MDRD: >60 ML/MIN/1.73M2
GLUCOSE SERPL-MCNC: 108 MG/DL (ref 70–99)
HCT VFR BLD AUTO: 44.4 % (ref 36.3–47.1)
HDLC SERPL-MCNC: 66 MG/DL
HGB BLD-MCNC: 14.5 G/DL (ref 11.9–15.1)
IMM GRANULOCYTES # BLD AUTO: 0.04 K/UL (ref 0–0.3)
IMM GRANULOCYTES NFR BLD: 1 %
IRON SERPL-MCNC: 60 UG/DL (ref 37–145)
LDLC SERPL CALC-MCNC: 100 MG/DL (ref 0–130)
LYMPHOCYTES # BLD: 26 % (ref 24–43)
LYMPHOCYTES NFR BLD: 1.9 K/UL (ref 1.1–3.7)
MAGNESIUM SERPL-MCNC: 2 MG/DL (ref 1.6–2.6)
MCH RBC QN AUTO: 32.4 PG (ref 25.2–33.5)
MCHC RBC AUTO-ENTMCNC: 32.7 G/DL (ref 28.4–34.8)
MCV RBC AUTO: 99.3 FL (ref 82.6–102.9)
MONOCYTES NFR BLD: 0.52 K/UL (ref 0.1–1.2)
MONOCYTES NFR BLD: 7 % (ref 3–12)
NEUTROPHILS NFR BLD: 63 % (ref 36–65)
NEUTS SEG NFR BLD: 4.73 K/UL (ref 1.5–8.1)
NRBC BLD-RTO: 0 PER 100 WBC
PLATELET # BLD AUTO: 223 K/UL (ref 138–453)
PMV BLD AUTO: 10 FL (ref 8.1–13.5)
POTASSIUM SERPL-SCNC: 4.6 MMOL/L (ref 3.7–5.3)
PROT SERPL-MCNC: 6.8 G/DL (ref 6.4–8.3)
RBC # BLD AUTO: 4.47 M/UL (ref 3.95–5.11)
SODIUM SERPL-SCNC: 139 MMOL/L (ref 135–144)
T3FREE SERPL-MCNC: 3.47 PG/ML (ref 2.02–4.43)
T4 FREE SERPL-MCNC: 1.1 NG/DL (ref 0.9–1.7)
TRIGL SERPL-MCNC: 42 MG/DL
TSH SERPL DL<=0.05 MIU/L-ACNC: 1.94 UIU/ML (ref 0.3–5)
WBC OTHER # BLD: 7.4 K/UL (ref 3.5–11.3)

## 2023-07-07 LAB
CAMPYLOBACTER DNA SPEC NAA+PROBE: NORMAL
ETEC ELTA+ESTB GENES STL QL NAA+PROBE: NORMAL
P SHIGELLOIDES DNA STL QL NAA+PROBE: NORMAL
SALMONELLA DNA SPEC QL NAA+PROBE: NORMAL
SHIGA TOXIN STX GENE SPEC NAA+PROBE: NORMAL
SHIGELLA DNA SPEC QL NAA+PROBE: NORMAL
SPECIMEN DESCRIPTION: NORMAL
V CHOL+PARA RFBL+TRKH+TNAA STL QL NAA+PR: NORMAL
Y ENTERO RECN STL QL NAA+PROBE: NORMAL

## 2023-07-13 ENCOUNTER — HOSPITAL ENCOUNTER (OUTPATIENT)
Dept: CT IMAGING | Age: 68
Discharge: HOME | End: 2023-07-15
Payer: MEDICARE

## 2023-07-13 DIAGNOSIS — K04.7 CHRONIC DENTAL INFECTION: ICD-10-CM

## 2023-07-13 DIAGNOSIS — J32.9 CHRONIC SINUSITIS, UNSPECIFIED LOCATION: ICD-10-CM

## 2023-07-13 DIAGNOSIS — B18.2 CHRONIC HEPATITIS C WITHOUT HEPATIC COMA (HCC): ICD-10-CM

## 2023-07-13 DIAGNOSIS — R19.7 DIARRHEA, UNSPECIFIED TYPE: ICD-10-CM

## 2023-07-13 DIAGNOSIS — J98.4 LUNG CYST: ICD-10-CM

## 2023-07-13 PROCEDURE — 74176 CT ABD & PELVIS W/O CONTRAST: CPT

## 2023-07-13 PROCEDURE — 70486 CT MAXILLOFACIAL W/O DYE: CPT

## 2023-08-01 ENCOUNTER — HOSPITAL ENCOUNTER (OUTPATIENT)
Dept: WOMENS IMAGING | Age: 68
Discharge: HOME | End: 2023-08-03
Payer: MEDICARE

## 2023-08-01 DIAGNOSIS — Z12.31 ENCOUNTER FOR SCREENING MAMMOGRAM FOR MALIGNANT NEOPLASM OF BREAST: ICD-10-CM

## 2023-08-01 DIAGNOSIS — Z12.39 BREAST CANCER SCREENING, HIGH RISK PATIENT: ICD-10-CM

## 2023-08-01 DIAGNOSIS — Z78.0 POSTMENOPAUSAL: ICD-10-CM

## 2023-08-01 PROCEDURE — 77063 BREAST TOMOSYNTHESIS BI: CPT

## 2023-08-01 PROCEDURE — 77080 DXA BONE DENSITY AXIAL: CPT

## 2023-08-02 PROBLEM — I36.1 NONRHEUMATIC TRICUSPID VALVE REGURGITATION: Status: ACTIVE | Noted: 2023-08-02

## 2023-08-02 PROBLEM — J98.4 LUNG CYST: Status: ACTIVE | Noted: 2023-08-02

## 2023-08-02 PROBLEM — I34.0 NONRHEUMATIC MITRAL VALVE REGURGITATION: Status: ACTIVE | Noted: 2023-08-02

## 2023-08-15 ENCOUNTER — HOSPITAL ENCOUNTER (OUTPATIENT)
Age: 68
Setting detail: SPECIMEN
Discharge: HOME | End: 2023-08-15

## 2023-08-19 LAB
ALPHA-1 ANTITRYPSIN PHENOTYPE: NORMAL
ALPHA-1 ANTITRYPSIN: 151 MG/DL (ref 90–200)

## 2023-09-08 ENCOUNTER — HOSPITAL ENCOUNTER (OUTPATIENT)
Facility: CLINIC | Age: 68
End: 2023-09-08
Payer: MEDICARE

## 2023-09-08 ENCOUNTER — HOSPITAL ENCOUNTER (OUTPATIENT)
Dept: GENERAL RADIOLOGY | Facility: CLINIC | Age: 68
End: 2023-09-08
Payer: MEDICARE

## 2023-09-08 DIAGNOSIS — M54.9 MID BACK PAIN: ICD-10-CM

## 2023-09-08 DIAGNOSIS — R06.02 SHORTNESS OF BREATH: ICD-10-CM

## 2023-09-08 DIAGNOSIS — R07.9 CHEST PAIN, UNSPECIFIED TYPE: ICD-10-CM

## 2023-09-08 PROBLEM — G47.33 OBSTRUCTIVE SLEEP APNEA (ADULT) (PEDIATRIC): Status: ACTIVE | Noted: 2023-09-01

## 2023-09-08 PROCEDURE — 71046 X-RAY EXAM CHEST 2 VIEWS: CPT

## 2023-09-08 PROCEDURE — 72072 X-RAY EXAM THORAC SPINE 3VWS: CPT

## 2023-09-19 ENCOUNTER — HOSPITAL ENCOUNTER (OUTPATIENT)
Dept: GENERAL RADIOLOGY | Age: 68
Discharge: HOME | End: 2023-09-21
Payer: MEDICARE

## 2023-09-19 DIAGNOSIS — B18.2 CHRONIC HEPATITIS C WITHOUT HEPATIC COMA (HCC): ICD-10-CM

## 2023-09-19 DIAGNOSIS — R07.9 CHEST PAIN, UNSPECIFIED TYPE: ICD-10-CM

## 2023-09-19 DIAGNOSIS — K21.9 GASTROESOPHAGEAL REFLUX DISEASE WITHOUT ESOPHAGITIS: ICD-10-CM

## 2023-09-19 PROCEDURE — 74220 X-RAY XM ESOPHAGUS 1CNTRST: CPT

## 2023-09-19 PROCEDURE — 2500000003 HC RX 250 WO HCPCS: Performed by: NURSE PRACTITIONER

## 2023-09-19 RX ADMIN — BARIUM SULFATE 160 ML: 960 POWDER, FOR SUSPENSION ORAL at 11:01

## 2023-09-19 RX ADMIN — BARIUM SULFATE 140 ML: 980 POWDER, FOR SUSPENSION ORAL at 11:02

## 2023-10-13 ENCOUNTER — HOSPITAL ENCOUNTER (OUTPATIENT)
Age: 68
Setting detail: SPECIMEN
Discharge: HOME | End: 2023-10-13

## 2023-10-13 DIAGNOSIS — M54.9 MID BACK PAIN: ICD-10-CM

## 2023-10-13 PROBLEM — I25.10 ATHEROSCLEROTIC HEART DISEASE OF NATIVE CORONARY ARTERY WITHOUT ANGINA PECTORIS: Status: ACTIVE | Noted: 2023-09-21

## 2023-10-14 LAB
MICROORGANISM SPEC CULT: NO GROWTH
SPECIMEN DESCRIPTION: NORMAL

## 2023-11-28 ENCOUNTER — TELEPHONE (OUTPATIENT)
Dept: ONCOLOGY | Age: 68
End: 2023-11-28

## 2023-11-28 ENCOUNTER — OFFICE VISIT (OUTPATIENT)
Dept: ONCOLOGY | Age: 68
End: 2023-11-28
Payer: MEDICARE

## 2023-11-28 VITALS
HEART RATE: 63 BPM | WEIGHT: 187.2 LBS | OXYGEN SATURATION: 97 % | DIASTOLIC BLOOD PRESSURE: 72 MMHG | TEMPERATURE: 96.9 F | BODY MASS INDEX: 36.56 KG/M2 | RESPIRATION RATE: 18 BRPM | SYSTOLIC BLOOD PRESSURE: 107 MMHG

## 2023-11-28 DIAGNOSIS — D69.1 DELTA STORAGE POOL DISEASE (HCC): Primary | ICD-10-CM

## 2023-11-28 PROCEDURE — G8427 DOCREV CUR MEDS BY ELIG CLIN: HCPCS | Performed by: INTERNAL MEDICINE

## 2023-11-28 PROCEDURE — 1036F TOBACCO NON-USER: CPT | Performed by: INTERNAL MEDICINE

## 2023-11-28 PROCEDURE — G8399 PT W/DXA RESULTS DOCUMENT: HCPCS | Performed by: INTERNAL MEDICINE

## 2023-11-28 PROCEDURE — 1123F ACP DISCUSS/DSCN MKR DOCD: CPT | Performed by: INTERNAL MEDICINE

## 2023-11-28 PROCEDURE — G8417 CALC BMI ABV UP PARAM F/U: HCPCS | Performed by: INTERNAL MEDICINE

## 2023-11-28 PROCEDURE — 3017F COLORECTAL CA SCREEN DOC REV: CPT | Performed by: INTERNAL MEDICINE

## 2023-11-28 PROCEDURE — 1090F PRES/ABSN URINE INCON ASSESS: CPT | Performed by: INTERNAL MEDICINE

## 2023-11-28 PROCEDURE — 99214 OFFICE O/P EST MOD 30 MIN: CPT | Performed by: INTERNAL MEDICINE

## 2023-11-28 PROCEDURE — 99211 OFF/OP EST MAY X REQ PHY/QHP: CPT | Performed by: INTERNAL MEDICINE

## 2023-11-28 PROCEDURE — G8484 FLU IMMUNIZE NO ADMIN: HCPCS | Performed by: INTERNAL MEDICINE

## 2023-11-28 NOTE — PROGRESS NOTES
DIAGNOSIS:   Left DVT  (tibial/ popliteal)   Delta storage disease.(platelet dysfunction)   Hepatitis C  Back pain, discovered to be due to cystic lesion compressing sciatic nerve as well as baker cyst, S/P surgery. CURRENT THERAPY:  Anticoagulation for 6-8 months. Surgical removal of the cyst.   Observation. BRIEF CASE HISTORY:   Smilye Espinal is a very pleasant 70 y.o. female who was admitted to the hospital with left LE DVT, around the left knee, she had a documented history of Delta storage granule disease. She was intially started on xarelto then transitioned to coumadin. Workup for ongoing pain showed compression of the nerve by a cystic structure, that was removed. She received 6-8 months of anticoagulation and that was stopped. 10/2018 patient is planning shoulder replacement surgery, plan for 1 unit of platelets prior to and Lovenox following in house. INTERIM HISTORY: The patient comes in today for a follow up and surgery clearance. She had an abnormal echocardiogram and he needed an EGD. She is planning to undergo GI workup. She denies any active bleeding. She had recurrent DVT and she is on Eliquis. PAST MEDICAL HISTORY: has a past medical history of Anxiety, Delta storage pool disease [facility]), Depression, DJD (degenerative joint disease), DVT (deep venous thrombosis) ([address]), GERD (gastroesophageal reflux disease), Hemorrhage, Hepatitis C, History of blood transfusion, Hx of blood clots, Osteoarthritis, Ovarian cyst rupture, Placenta accreta, PONV (postoperative nausea and vomiting), Prolapsed bladder, Prolonged emergence from general anesthesia, Uterine rupture, and UTI (lower urinary tract infection). PAST SURGICAL HISTORY: has a past surgical history that includes Tonsillectomy; Dilation and curettage of uterus; liver biopsy; bladder suspension; Total knee arthroplasty (Bilateral); joint replacement;  section (7/15/1979); Hysterectomy (7/15/1979);  Colonoscopy; other

## 2023-11-30 ENCOUNTER — TELEPHONE (OUTPATIENT)
Dept: INFUSION THERAPY | Age: 68
End: 2023-11-30

## 2024-02-23 ENCOUNTER — TELEPHONE (OUTPATIENT)
Dept: PHARMACY | Facility: CLINIC | Age: 69
End: 2024-02-23

## 2024-02-23 NOTE — TELEPHONE ENCOUNTER
[facility] CLINICAL PHARMACY: ADHERENCE REVIEW  Identified care gap per [employer]: fills at [facility]: Statin adherence    ASSESSMENT  STATIN ADHERENCE    Insurance Records claims through 2023 (Prior Year PDC = 92% - PASSED;)    Atorvastatin 40mg  last filled on 24 for 30 day supply. Next refill due: 24    Prescribed si tablet/capsule daily    Per Insurer Portal: same as above     Per [facility]:  Will fill 90 DS today    Lab Results   Component Value Date    CHOL 174 2023    TRIG 42 2023    HDL 66 2023    LDLCHOLESTEROL 100 2023     ALT   Date Value Ref Range Status   2023 10 5 - 33 U/L Final     AST   Date Value Ref Range Status   2023 17 <32 U/L Final     The 10-year ASCVD risk score (Brenden MEJIA, et al., 2019) is: 8%    Values used to calculate the score:      Age: 68 years      Sex: Female      Is Non- : No      Diabetic: No      Tobacco smoker: No      Systolic Blood Pressure: 130 mmHg      Is BP treated: No      HDL Cholesterol: 66 mg/dL      Total Cholesterol: 174 mg/dL     PLAN  Per insurer report, LIS-0 - co-pays are based on tiers and patient is subject to coverage gap.    The following are interventions that have been identified:   [facility] will fill 90 day supply today for no charge    No patient out reach planned at this time.    Last Visit: 24  Next Visit: 4.15.24  Recent Visits  Date Type Provider Dept   24 Office Visit Lianet Agarwal APRN - CNP [facility]   10/13/23 Office Visit Lianet Agarwal APRN - CNP [facility]   23 Office Visit Lianet Agarwal APRN - CNP [facility]    23 Office Visit Lianet Agarwal APRN - CNP [facility]    23 Office Visit Lianet Agarwal APRN - CNP [facility]   Showing recent visits within past 540 days with a meds authorizing provider and meeting all other requirements  Future Appointments  Date Type

## 2024-04-15 ENCOUNTER — HOSPITAL ENCOUNTER (OUTPATIENT)
Age: 69
Setting detail: SPECIMEN
Discharge: HOME | End: 2024-04-15

## 2024-04-15 DIAGNOSIS — I25.10 CORONARY ARTERY DISEASE INVOLVING NATIVE CORONARY ARTERY OF NATIVE HEART WITHOUT ANGINA PECTORIS: ICD-10-CM

## 2024-04-15 DIAGNOSIS — E55.9 VITAMIN D DEFICIENCY: ICD-10-CM

## 2024-04-15 DIAGNOSIS — R73.9 HYPERGLYCEMIA: ICD-10-CM

## 2024-04-15 DIAGNOSIS — D50.9 IRON DEFICIENCY ANEMIA, UNSPECIFIED IRON DEFICIENCY ANEMIA TYPE: ICD-10-CM

## 2024-04-15 LAB
25(OH)D3 SERPL-MCNC: 43.9 NG/ML (ref 30–100)
ALBUMIN SERPL-MCNC: 4.5 G/DL (ref 3.5–5.2)
ALBUMIN/GLOB SERPL: 2 {RATIO} (ref 1–2.5)
ALP SERPL-CCNC: 76 U/L (ref 35–104)
ALT SERPL-CCNC: 11 U/L (ref 10–35)
ANION GAP SERPL CALCULATED.3IONS-SCNC: 11 MMOL/L (ref 9–16)
AST SERPL-CCNC: 20 U/L (ref 10–35)
BILIRUB SERPL-MCNC: 0.7 MG/DL (ref 0–1.2)
BUN SERPL-MCNC: 16 MG/DL (ref 8–23)
CALCIUM SERPL-MCNC: 9.4 MG/DL (ref 8.6–10.4)
CHLORIDE SERPL-SCNC: 100 MMOL/L (ref 98–107)
CHOLEST SERPL-MCNC: 147 MG/DL (ref 0–199)
CHOLESTEROL/HDL RATIO: 2
CO2 SERPL-SCNC: 29 MMOL/L (ref 20–31)
CREAT SERPL-MCNC: 1 MG/DL (ref 0.5–0.9)
ERYTHROCYTE [DISTWIDTH] IN BLOOD BY AUTOMATED COUNT: 13.5 % (ref 11.8–14.4)
EST. AVERAGE GLUCOSE BLD GHB EST-MCNC: 111 MG/DL
GFR SERPL CREATININE-BSD FRML MDRD: 63 ML/MIN/1.73M2
GLUCOSE SERPL-MCNC: 95 MG/DL (ref 74–99)
HBA1C MFR BLD: 5.5 % (ref 4–6)
HCT VFR BLD AUTO: 41.9 % (ref 36.3–47.1)
HDLC SERPL-MCNC: 75 MG/DL
HGB BLD-MCNC: 13.6 G/DL (ref 11.9–15.1)
IRON SERPL-MCNC: 63 UG/DL (ref 37–145)
LDLC SERPL CALC-MCNC: 59 MG/DL (ref 0–100)
MAGNESIUM SERPL-MCNC: 2 MG/DL (ref 1.6–2.4)
MCH RBC QN AUTO: 30.6 PG (ref 25.2–33.5)
MCHC RBC AUTO-ENTMCNC: 32.5 G/DL (ref 28.4–34.8)
MCV RBC AUTO: 94.2 FL (ref 82.6–102.9)
NRBC BLD-RTO: 0 PER 100 WBC
PLATELET # BLD AUTO: 218 K/UL (ref 138–453)
PMV BLD AUTO: 9.7 FL (ref 8.1–13.5)
POTASSIUM SERPL-SCNC: 4.4 MMOL/L (ref 3.7–5.3)
PROT SERPL-MCNC: 7.4 G/DL (ref 6.6–8.7)
RBC # BLD AUTO: 4.45 M/UL (ref 3.95–5.11)
SODIUM SERPL-SCNC: 140 MMOL/L (ref 136–145)
TRIGL SERPL-MCNC: 64 MG/DL
VLDLC SERPL CALC-MCNC: 13 MG/DL
WBC OTHER # BLD: 11.5 K/UL (ref 3.5–11.3)

## 2024-04-25 PROBLEM — R73.9 HYPERGLYCEMIA: Status: ACTIVE | Noted: 2024-04-25

## 2024-04-25 PROBLEM — Z87.891 FORMER SMOKER: Status: ACTIVE | Noted: 2024-04-25

## 2024-07-15 ENCOUNTER — HOSPITAL ENCOUNTER (OUTPATIENT)
Dept: CT IMAGING | Age: 69
Discharge: HOME | End: 2024-07-17
Payer: MEDICARE

## 2024-07-15 VITALS — HEIGHT: 60 IN | BODY MASS INDEX: 33.77 KG/M2 | WEIGHT: 172 LBS

## 2024-07-15 DIAGNOSIS — Z87.891 PERSONAL HISTORY OF TOBACCO USE: ICD-10-CM

## 2024-07-15 PROBLEM — M85.852 OSTEOPENIA OF LEFT FEMORAL NECK: Status: ACTIVE | Noted: 2017-06-26

## 2024-07-15 PROCEDURE — 71271 CT THORAX LUNG CANCER SCR C-: CPT

## 2024-08-06 ENCOUNTER — HOSPITAL ENCOUNTER (OUTPATIENT)
Age: 69
Setting detail: SPECIMEN
Discharge: HOME | End: 2024-08-06

## 2024-08-06 DIAGNOSIS — N30.00 ACUTE CYSTITIS WITHOUT HEMATURIA: ICD-10-CM

## 2024-08-06 PROBLEM — G47.61 PERIODIC LIMB MOVEMENT SLEEP DISORDER: Status: ACTIVE | Noted: 2024-08-06

## 2024-08-06 PROBLEM — N39.0 FREQUENT UTI: Status: ACTIVE | Noted: 2024-08-06

## 2024-08-07 LAB
BACTERIA URNS QL MICRO: ABNORMAL
BILIRUB UR QL STRIP: NEGATIVE
CASTS #/AREA URNS LPF: ABNORMAL /LPF (ref 0–8)
CLARITY UR: ABNORMAL
COLOR UR: YELLOW
EPI CELLS #/AREA URNS HPF: ABNORMAL /HPF (ref 0–5)
GLUCOSE UR STRIP-MCNC: NEGATIVE MG/DL
HGB UR QL STRIP.AUTO: NEGATIVE
KETONES UR STRIP-MCNC: NEGATIVE MG/DL
LEUKOCYTE ESTERASE UR QL STRIP: ABNORMAL
NITRITE UR QL STRIP: NEGATIVE
PH UR STRIP: 5.5 [PH] (ref 5–8)
PROT UR STRIP-MCNC: NEGATIVE MG/DL
RBC #/AREA URNS HPF: ABNORMAL /HPF (ref 0–4)
SP GR UR STRIP: 1.01 (ref 1–1.03)
UROBILINOGEN UR STRIP-ACNC: NORMAL EU/DL (ref 0–1)
WBC #/AREA URNS HPF: ABNORMAL /HPF (ref 0–5)

## 2024-08-08 LAB
MICROORGANISM SPEC CULT: ABNORMAL
SPECIMEN DESCRIPTION: ABNORMAL

## 2024-08-23 ENCOUNTER — HOSPITAL ENCOUNTER (OUTPATIENT)
Dept: WOMENS IMAGING | Age: 69
End: 2024-08-23
Payer: MEDICARE

## 2024-08-23 ENCOUNTER — HOSPITAL ENCOUNTER (OUTPATIENT)
Age: 69
Setting detail: SPECIMEN
Discharge: HOME | End: 2024-08-23

## 2024-08-23 ENCOUNTER — HOSPITAL ENCOUNTER (OUTPATIENT)
Dept: VASCULAR LAB | Age: 69
End: 2024-08-23
Payer: MEDICARE

## 2024-08-23 DIAGNOSIS — R09.89 OTHER SPECIFIED SYMPTOMS AND SIGNS INVOLVING THE CIRCULATORY AND RESPIRATORY SYSTEMS: ICD-10-CM

## 2024-08-23 DIAGNOSIS — R30.0 DYSURIA: ICD-10-CM

## 2024-08-23 DIAGNOSIS — Z12.31 SCREENING MAMMOGRAM FOR HIGH-RISK PATIENT: ICD-10-CM

## 2024-08-23 DIAGNOSIS — G47.61 PERIODIC LIMB MOVEMENT SLEEP DISORDER: ICD-10-CM

## 2024-08-23 LAB
BACTERIA URNS QL MICRO: NORMAL
BILIRUB UR QL STRIP: NEGATIVE
CASTS #/AREA URNS LPF: NORMAL /LPF (ref 0–8)
CLARITY UR: CLEAR
COLOR UR: YELLOW
EPI CELLS #/AREA URNS HPF: NORMAL /HPF (ref 0–5)
GLUCOSE UR STRIP-MCNC: NEGATIVE MG/DL
HGB UR QL STRIP.AUTO: NEGATIVE
KETONES UR STRIP-MCNC: NEGATIVE MG/DL
LEUKOCYTE ESTERASE UR QL STRIP: ABNORMAL
NITRITE UR QL STRIP: NEGATIVE
PH UR STRIP: 6 [PH] (ref 5–8)
PROT UR STRIP-MCNC: NEGATIVE MG/DL
RBC #/AREA URNS HPF: NORMAL /HPF (ref 0–4)
SP GR UR STRIP: 1.01 (ref 1–1.03)
UROBILINOGEN UR STRIP-ACNC: NORMAL EU/DL (ref 0–1)
WBC #/AREA URNS HPF: NORMAL /HPF (ref 0–5)

## 2024-08-23 PROCEDURE — 77063 BREAST TOMOSYNTHESIS BI: CPT

## 2024-08-23 PROCEDURE — 93923 UPR/LXTR ART STDY 3+ LVLS: CPT

## 2024-08-24 LAB
VAS LEFT ABI: 1.13
VAS LEFT ARM BP: 112 MMHG
VAS LEFT CALF PRESSURE: 139 MMHG
VAS LEFT DORSALIS PEDIS BP: 133 MMHG
VAS LEFT LOW THIGH PRESSURE: 169 MMHG
VAS LEFT PTA BP: 136 MMHG
VAS LEFT TBI: 0.84
VAS LEFT TOE PRESSURE: 101 MMHG
VAS RIGHT ABI: 1.19
VAS RIGHT ARM BP: 120 MMHG
VAS RIGHT CALF PRESSURE: 143 MMHG
VAS RIGHT DORSALIS PEDIS BP: 134 MMHG
VAS RIGHT LOW THIGH PRESSURE: 157 MMHG
VAS RIGHT PTA BP: 143 MMHG
VAS RIGHT TBI: 0.88
VAS RIGHT TOE PRESSURE: 106 MMHG

## 2024-08-24 PROCEDURE — 93923 UPR/LXTR ART STDY 3+ LVLS: CPT | Performed by: STUDENT IN AN ORGANIZED HEALTH CARE EDUCATION/TRAINING PROGRAM

## 2024-10-11 ENCOUNTER — HOSPITAL ENCOUNTER (OUTPATIENT)
Age: 69
Setting detail: SPECIMEN
Discharge: HOME | End: 2024-10-11

## 2024-10-11 DIAGNOSIS — I25.10 CORONARY ARTERY DISEASE INVOLVING NATIVE CORONARY ARTERY OF NATIVE HEART WITHOUT ANGINA PECTORIS: ICD-10-CM

## 2024-10-11 DIAGNOSIS — D72.829 LEUKOCYTOSIS, UNSPECIFIED TYPE: ICD-10-CM

## 2024-10-11 DIAGNOSIS — E78.00 PURE HYPERCHOLESTEROLEMIA: ICD-10-CM

## 2024-10-11 DIAGNOSIS — E55.9 VITAMIN D DEFICIENCY: ICD-10-CM

## 2024-10-11 DIAGNOSIS — R73.9 HYPERGLYCEMIA: ICD-10-CM

## 2024-10-11 DIAGNOSIS — D50.9 IRON DEFICIENCY ANEMIA, UNSPECIFIED IRON DEFICIENCY ANEMIA TYPE: ICD-10-CM

## 2024-10-11 DIAGNOSIS — B18.2 CHRONIC HEPATITIS C WITHOUT HEPATIC COMA (HCC): ICD-10-CM

## 2024-10-11 LAB
25(OH)D3 SERPL-MCNC: 36.7 NG/ML (ref 30–100)
ALBUMIN SERPL-MCNC: 4.4 G/DL (ref 3.5–5.2)
ALBUMIN/GLOB SERPL: 2 {RATIO} (ref 1–2.5)
ALP SERPL-CCNC: 57 U/L (ref 35–104)
ALT SERPL-CCNC: 11 U/L (ref 10–35)
ANION GAP SERPL CALCULATED.3IONS-SCNC: 11 MMOL/L (ref 9–16)
AST SERPL-CCNC: 18 U/L (ref 10–35)
BASOPHILS # BLD: 0.06 K/UL (ref 0–0.2)
BASOPHILS NFR BLD: 1 % (ref 0–2)
BILIRUB SERPL-MCNC: 0.7 MG/DL (ref 0–1.2)
BUN SERPL-MCNC: 11 MG/DL (ref 8–23)
CALCIUM SERPL-MCNC: 9.5 MG/DL (ref 8.6–10.4)
CHLORIDE SERPL-SCNC: 100 MMOL/L (ref 98–107)
CHOLEST SERPL-MCNC: 167 MG/DL (ref 0–199)
CHOLESTEROL/HDL RATIO: 2
CO2 SERPL-SCNC: 28 MMOL/L (ref 20–31)
CREAT SERPL-MCNC: 0.7 MG/DL (ref 0.5–0.9)
EOSINOPHIL # BLD: 0.13 K/UL (ref 0–0.44)
EOSINOPHILS RELATIVE PERCENT: 2 % (ref 1–4)
ERYTHROCYTE [DISTWIDTH] IN BLOOD BY AUTOMATED COUNT: 14.5 % (ref 11.8–14.4)
GFR, ESTIMATED: 87 ML/MIN/1.73M2
GLUCOSE SERPL-MCNC: 98 MG/DL (ref 74–99)
HCT VFR BLD AUTO: 39.8 % (ref 36.3–47.1)
HDLC SERPL-MCNC: 78 MG/DL
HGB BLD-MCNC: 12.9 G/DL (ref 11.9–15.1)
IMM GRANULOCYTES # BLD AUTO: 0.03 K/UL (ref 0–0.3)
IMM GRANULOCYTES NFR BLD: 0 %
IMM RETICS NFR: 8.8 % (ref 2.7–18.3)
IRON SERPL-MCNC: 67 UG/DL (ref 37–145)
LDLC SERPL CALC-MCNC: 77 MG/DL (ref 0–100)
LYMPHOCYTES NFR BLD: 2.59 K/UL (ref 1.1–3.7)
LYMPHOCYTES RELATIVE PERCENT: 36 % (ref 24–43)
MAGNESIUM SERPL-MCNC: 1.9 MG/DL (ref 1.6–2.4)
MCH RBC QN AUTO: 33.2 PG (ref 25.2–33.5)
MCHC RBC AUTO-ENTMCNC: 32.4 G/DL (ref 28.4–34.8)
MCV RBC AUTO: 102.6 FL (ref 82.6–102.9)
MONOCYTES NFR BLD: 0.53 K/UL (ref 0.1–1.2)
MONOCYTES NFR BLD: 7 % (ref 3–12)
NEUTROPHILS NFR BLD: 54 % (ref 36–65)
NEUTS SEG NFR BLD: 3.89 K/UL (ref 1.5–8.1)
NRBC BLD-RTO: 0 PER 100 WBC
PLATELET # BLD AUTO: 239 K/UL (ref 138–453)
PMV BLD AUTO: 9.5 FL (ref 8.1–13.5)
POTASSIUM SERPL-SCNC: 4.3 MMOL/L (ref 3.7–5.3)
PROT SERPL-MCNC: 7.1 G/DL (ref 6.6–8.7)
RBC # BLD AUTO: 3.88 M/UL (ref 3.95–5.11)
RBC # BLD: ABNORMAL 10*6/UL
RETIC HEMOGLOBIN: 36.8 PG (ref 28.2–35.7)
RETICS # AUTO: 0.07 M/UL (ref 0.03–0.08)
RETICS/RBC NFR AUTO: 1.9 % (ref 0.5–1.9)
SODIUM SERPL-SCNC: 139 MMOL/L (ref 136–145)
TRIGL SERPL-MCNC: 65 MG/DL
VLDLC SERPL CALC-MCNC: 13 MG/DL
WBC OTHER # BLD: 7.2 K/UL (ref 3.5–11.3)

## 2024-10-12 LAB
EST. AVERAGE GLUCOSE BLD GHB EST-MCNC: 111 MG/DL
HBA1C MFR BLD: 5.5 % (ref 4–6)

## 2024-10-14 LAB — SURGICAL PATHOLOGY REPORT: NORMAL

## 2024-10-15 PROBLEM — G25.81 RESTLESS LEG SYNDROME: Status: RESOLVED | Noted: 2024-10-15 | Resolved: 2024-10-15

## 2024-10-15 PROBLEM — G25.81 RESTLESS LEG SYNDROME: Status: ACTIVE | Noted: 2024-10-15

## 2024-10-15 PROBLEM — G47.61 PERIODIC LIMB MOVEMENT SLEEP DISORDER: Status: RESOLVED | Noted: 2024-08-06 | Resolved: 2024-10-15

## 2024-10-15 LAB — PATH REV BLD -IMP: NORMAL

## 2024-12-11 ENCOUNTER — HOSPITAL ENCOUNTER (OUTPATIENT)
Dept: SLEEP CENTER | Age: 69
Discharge: HOME | End: 2024-12-13
Payer: MEDICARE

## 2024-12-11 VITALS
WEIGHT: 170 LBS | OXYGEN SATURATION: 98 % | BODY MASS INDEX: 33.38 KG/M2 | HEART RATE: 61 BPM | RESPIRATION RATE: 19 BRPM | HEIGHT: 60 IN

## 2024-12-11 PROCEDURE — 95811 POLYSOM 6/>YRS CPAP 4/> PARM: CPT

## 2024-12-11 ASSESSMENT — SLEEP AND FATIGUE QUESTIONNAIRES
ESS TOTAL SCORE: 3
HOW LIKELY ARE YOU TO NOD OFF OR FALL ASLEEP WHILE SITTING QUIETLY AFTER LUNCH WITHOUT ALCOHOL: WOULD NEVER DOZE
HOW LIKELY ARE YOU TO NOD OFF OR FALL ASLEEP WHILE SITTING AND TALKING TO SOMEONE: WOULD NEVER DOZE
HOW LIKELY ARE YOU TO NOD OFF OR FALL ASLEEP IN A CAR, WHILE STOPPED FOR A FEW MINUTES IN TRAFFIC: WOULD NEVER DOZE
HOW LIKELY ARE YOU TO NOD OFF OR FALL ASLEEP WHILE SITTING AND READING: SLIGHT CHANCE OF DOZING
HOW LIKELY ARE YOU TO NOD OFF OR FALL ASLEEP WHILE SITTING INACTIVE IN A PUBLIC PLACE: WOULD NEVER DOZE
HOW LIKELY ARE YOU TO NOD OFF OR FALL ASLEEP WHEN YOU ARE A PASSENGER IN A CAR FOR AN HOUR WITHOUT A BREAK: WOULD NEVER DOZE
HOW LIKELY ARE YOU TO NOD OFF OR FALL ASLEEP WHILE LYING DOWN TO REST IN THE AFTERNOON WHEN CIRCUMSTANCES PERMIT: SLIGHT CHANCE OF DOZING
HOW LIKELY ARE YOU TO NOD OFF OR FALL ASLEEP WHILE WATCHING TV: SLIGHT CHANCE OF DOZING

## 2024-12-17 ENCOUNTER — TRANSCRIBE ORDERS (OUTPATIENT)
Dept: ADMINISTRATIVE | Age: 69
End: 2024-12-17

## 2024-12-17 DIAGNOSIS — J44.9 CHRONIC OBSTRUCTIVE PULMONARY DISEASE, UNSPECIFIED COPD TYPE (HCC): Primary | ICD-10-CM

## 2024-12-26 LAB — STATUS: NORMAL

## 2025-02-10 ENCOUNTER — HOSPITAL ENCOUNTER (OUTPATIENT)
Age: 70
Setting detail: SPECIMEN
Discharge: HOME | End: 2025-02-10

## 2025-02-10 ENCOUNTER — OFFICE VISIT (OUTPATIENT)
Dept: ORTHOPEDIC SURGERY | Age: 70
End: 2025-02-10
Payer: MEDICARE

## 2025-02-10 VITALS — RESPIRATION RATE: 16 BRPM | BODY MASS INDEX: 32.83 KG/M2 | WEIGHT: 167.2 LBS | HEIGHT: 60 IN

## 2025-02-10 DIAGNOSIS — M25.562 LEFT KNEE PAIN, UNSPECIFIED CHRONICITY: ICD-10-CM

## 2025-02-10 DIAGNOSIS — Z96.652 HISTORY OF REVISION OF TOTAL REPLACEMENT OF LEFT KNEE JOINT: Primary | ICD-10-CM

## 2025-02-10 DIAGNOSIS — Z96.652 HISTORY OF TOTAL LEFT KNEE REPLACEMENT: ICD-10-CM

## 2025-02-10 LAB
ALBUMIN SERPL-MCNC: 4.2 G/DL (ref 3.5–5.2)
ALBUMIN/GLOB SERPL: 1.6 {RATIO} (ref 1–2.5)
ALP SERPL-CCNC: 71 U/L (ref 35–104)
ALT SERPL-CCNC: 13 U/L (ref 10–35)
ANION GAP SERPL CALCULATED.3IONS-SCNC: 12 MMOL/L (ref 9–16)
AST SERPL-CCNC: 18 U/L (ref 10–35)
BASOPHILS # BLD: 0.04 K/UL (ref 0–0.2)
BASOPHILS NFR BLD: 1 % (ref 0–2)
BILIRUB SERPL-MCNC: 0.6 MG/DL (ref 0–1.2)
BUN SERPL-MCNC: 12 MG/DL (ref 8–23)
CALCIUM SERPL-MCNC: 9.4 MG/DL (ref 8.6–10.4)
CHLORIDE SERPL-SCNC: 98 MMOL/L (ref 98–107)
CO2 SERPL-SCNC: 28 MMOL/L (ref 20–31)
CREAT SERPL-MCNC: 0.7 MG/DL (ref 0.6–0.9)
CRP SERPL HS-MCNC: <3 MG/L (ref 0–5)
EOSINOPHIL # BLD: 0.08 K/UL (ref 0–0.44)
EOSINOPHILS RELATIVE PERCENT: 1 % (ref 1–4)
ERYTHROCYTE [DISTWIDTH] IN BLOOD BY AUTOMATED COUNT: 12.9 % (ref 11.8–14.4)
GFR, ESTIMATED: >90 ML/MIN/1.73M2
GLUCOSE SERPL-MCNC: 95 MG/DL (ref 74–99)
HCT VFR BLD AUTO: 37.1 % (ref 36.3–47.1)
HGB BLD-MCNC: 12.4 G/DL (ref 11.9–15.1)
IMM GRANULOCYTES # BLD AUTO: 0.04 K/UL (ref 0–0.3)
IMM GRANULOCYTES NFR BLD: 1 %
LYMPHOCYTES NFR BLD: 2.51 K/UL (ref 1.1–3.7)
LYMPHOCYTES RELATIVE PERCENT: 33 % (ref 24–43)
MCH RBC QN AUTO: 33.4 PG (ref 25.2–33.5)
MCHC RBC AUTO-ENTMCNC: 33.4 G/DL (ref 28.4–34.8)
MCV RBC AUTO: 100 FL (ref 82.6–102.9)
MONOCYTES NFR BLD: 0.58 K/UL (ref 0.1–1.2)
MONOCYTES NFR BLD: 8 % (ref 3–12)
NEUTROPHILS NFR BLD: 56 % (ref 36–65)
NEUTS SEG NFR BLD: 4.27 K/UL (ref 1.5–8.1)
NRBC BLD-RTO: 0 PER 100 WBC
PLATELET # BLD AUTO: 243 K/UL (ref 138–453)
PMV BLD AUTO: 9.6 FL (ref 8.1–13.5)
POTASSIUM SERPL-SCNC: 3.9 MMOL/L (ref 3.7–5.3)
PROT SERPL-MCNC: 6.8 G/DL (ref 6.6–8.7)
RBC # BLD AUTO: 3.71 M/UL (ref 3.95–5.11)
SODIUM SERPL-SCNC: 138 MMOL/L (ref 136–145)
WBC OTHER # BLD: 7.5 K/UL (ref 3.5–11.3)

## 2025-02-10 PROCEDURE — 1036F TOBACCO NON-USER: CPT | Performed by: PHYSICIAN ASSISTANT

## 2025-02-10 PROCEDURE — G8399 PT W/DXA RESULTS DOCUMENT: HCPCS | Performed by: PHYSICIAN ASSISTANT

## 2025-02-10 PROCEDURE — 99214 OFFICE O/P EST MOD 30 MIN: CPT | Performed by: PHYSICIAN ASSISTANT

## 2025-02-10 PROCEDURE — 1159F MED LIST DOCD IN RCRD: CPT | Performed by: PHYSICIAN ASSISTANT

## 2025-02-10 PROCEDURE — 1090F PRES/ABSN URINE INCON ASSESS: CPT | Performed by: PHYSICIAN ASSISTANT

## 2025-02-10 PROCEDURE — G8417 CALC BMI ABV UP PARAM F/U: HCPCS | Performed by: PHYSICIAN ASSISTANT

## 2025-02-10 PROCEDURE — G8427 DOCREV CUR MEDS BY ELIG CLIN: HCPCS | Performed by: PHYSICIAN ASSISTANT

## 2025-02-10 PROCEDURE — 1125F AMNT PAIN NOTED PAIN PRSNT: CPT | Performed by: PHYSICIAN ASSISTANT

## 2025-02-10 PROCEDURE — 1123F ACP DISCUSS/DSCN MKR DOCD: CPT | Performed by: PHYSICIAN ASSISTANT

## 2025-02-10 PROCEDURE — 3017F COLORECTAL CA SCREEN DOC REV: CPT | Performed by: PHYSICIAN ASSISTANT

## 2025-02-10 ASSESSMENT — ENCOUNTER SYMPTOMS
VOMITING: 0
SHORTNESS OF BREATH: 0
COUGH: 0
COLOR CHANGE: 0

## 2025-02-10 NOTE — PROGRESS NOTES
[facility]  [address] [place] [address]  Dept: [phone]  Dept Fax: [phone]        Ambulatory Follow Up      Subjective:   Mavis Ann is a 69 y.o. year old female who presents to our office today for routine followup regarding her   1. History of revision of total replacement of left knee joint    2. Left knee pain, unspecified chronicity        Chief Complaint   Patient presents with    Knee Pain      L knee pain     Date of Surgery:  1/24/2022 - L TKA Revision    History of Present Illness  The patient is a 69-year-old female who presents today for left knee pain that has been ongoing for the last few months.    She reports persistent pain in her left knee, which intensifies during ambulation. The pain radiates throughout her entire leg, extending into the shin. She has not experienced any recent trauma, injury, or falls. She does not report any instances of her knee buckling or giving way. However, she has developed a habit of ascending stairs one step at a time, leading with her right foot, due to a lack of confidence in her left knee.     She has not engaged in any recent physical therapy to strengthen the knee. The onset of her pain is typically felt in the hip before it radiates downwards. She is able to dorsiflex and plantarflex her foot without significant discomfort.     She has been managing the pain with Tylenol, although she reports minimal relief. She is unable to take Motrin due to concurrent use of blood thinners for cardiac conditions.     She had a left total knee arthroplasty 15 or 16 years ago and a left total knee revision on 01/24/2022 with Dr. Navarro.     MEDICATIONS  Current: Tylenol, Plavix      Review of Systems   Constitutional:  Negative for activity change and fever.   HENT:  Negative for sneezing.    Respiratory:  Negative for cough and shortness of breath.    Cardiovascular:

## 2025-02-11 ENCOUNTER — TELEPHONE (OUTPATIENT)
Dept: ORTHOPEDIC SURGERY | Age: 70
End: 2025-02-11

## 2025-02-11 LAB — ERYTHROCYTE [SEDIMENTATION RATE] IN BLOOD BY PHOTOMETRIC METHOD: 4 MM/HR (ref 0–30)

## 2025-02-11 NOTE — TELEPHONE ENCOUNTER
----- Message from Shellie Samuel PA-C sent at 2/11/2025  2:30 PM EST -----  Please call the patient and make her aware that her blood work is not showing any signs of infection at this time. I recommend she gets the CT scan that was ordered and I will call her when I receive the results.

## 2025-02-17 ENCOUNTER — HOSPITAL ENCOUNTER (OUTPATIENT)
Dept: CT IMAGING | Age: 70
Discharge: HOME | End: 2025-02-19
Payer: MEDICARE

## 2025-02-17 DIAGNOSIS — M25.562 LEFT KNEE PAIN, UNSPECIFIED CHRONICITY: ICD-10-CM

## 2025-02-17 DIAGNOSIS — Z96.652 HISTORY OF REVISION OF TOTAL REPLACEMENT OF LEFT KNEE JOINT: ICD-10-CM

## 2025-02-17 PROCEDURE — 73700 CT LOWER EXTREMITY W/O DYE: CPT

## 2025-02-19 ENCOUNTER — TELEPHONE (OUTPATIENT)
Dept: ORTHOPEDIC SURGERY | Age: 70
End: 2025-02-19

## 2025-02-19 SDOH — HEALTH STABILITY: PHYSICAL HEALTH: ON AVERAGE, HOW MANY MINUTES DO YOU ENGAGE IN EXERCISE AT THIS LEVEL?: 10 MIN

## 2025-02-19 SDOH — HEALTH STABILITY: PHYSICAL HEALTH: ON AVERAGE, HOW MANY DAYS PER WEEK DO YOU ENGAGE IN MODERATE TO STRENUOUS EXERCISE (LIKE A BRISK WALK)?: 0 DAYS

## 2025-02-19 NOTE — TELEPHONE ENCOUNTER
I called the patient in regards to her left knee CT scan and blood work results.  She does not have signs of infection at this time but she does have signs of loosening of the tibial component.    I did discuss the patient's findings with Dr. Navarro and he recommended patient be evaluated for revision of the tibial component due to loosening.  He recommended the patient be evaluated by Dr. Blake.    I did reach out to Dr. Blake and he is amenable to seeing the patient.  Patient was scheduled with Dr. Owen Blake DO on 2/20/2025 at 10:30 AM at our [place] location.  She was encouraged to call our office with any questions or concerns prior to that appointment.  She noted her understanding.

## 2025-02-20 ENCOUNTER — OFFICE VISIT (OUTPATIENT)
Dept: ORTHOPEDIC SURGERY | Age: 70
End: 2025-02-20

## 2025-02-20 VITALS — WEIGHT: 167 LBS | BODY MASS INDEX: 32.79 KG/M2 | HEIGHT: 60 IN

## 2025-02-20 DIAGNOSIS — T84.033A MECHANICAL LOOSENING OF INTERNAL LEFT KNEE PROSTHETIC JOINT, INITIAL ENCOUNTER: ICD-10-CM

## 2025-02-20 DIAGNOSIS — Z96.652 HISTORY OF REVISION OF TOTAL REPLACEMENT OF LEFT KNEE JOINT: Primary | ICD-10-CM

## 2025-02-20 RX ORDER — EZETIMIBE 10 MG/1
10 TABLET ORAL DAILY
COMMUNITY

## 2025-02-20 ASSESSMENT — ENCOUNTER SYMPTOMS
EYE DISCHARGE: 0
SHORTNESS OF BREATH: 0
ROS SKIN COMMENTS: NEGATIVE FOR RASH
ABDOMINAL PAIN: 0

## 2025-02-20 NOTE — PROGRESS NOTES
surgery, bone grafting, infection, nerve injury, paralysis, numbness, blood vessel injury, excessive scaring, wound complication or breakdown, failure of symptoms to improve or actual deterioration in condition, significant acute and/or chronic pain, possible need for amputation, permanent loss of motion, and permanent loss of function.  As well as the general complications of anesthesia, which include but are not limited to: myocardial infarction and/or heart attack, stroke, multi organ system failure or even possible death, prolonged hospital stay, blood clots, pulmonary embolism, abnormal reaction to medication, visual and neurological disturbances, constipation, ischemic bowel, bowel obstruction, bowel perforation, ileus and mental status changes.  No guarantees were made.       Follow up:No follow-ups on file.    No orders of the defined types were placed in this encounter.        No orders of the defined types were placed in this encounter.      The patient (or guardian, if applicable) and other individuals in attendance with the patient were advised that Artificial Intelligence will be utilized during this visit to record, process the conversation to generate a clinical note, and support improvement of the AI technology. The patient (or guardian, if applicable) and other individuals in attendance at the appointment consented to the use of AI, including the recording.                 This note is created with the assistance of a speech recognition program.  While intending to generate a document that actually reflects the content of the visit, the document can still have some errors including those of syntax and sound a like substitutions which may escape proof reading.  In such instances, actual meaning can be extrapolated by contextual diversion      Electronically signed by Owen Blake DO, FAOAO on 2/20/2025 at 12:09 PM

## 2025-02-21 ENCOUNTER — HOSPITAL ENCOUNTER (OUTPATIENT)
Dept: PULMONOLOGY | Age: 70
Discharge: HOME | End: 2025-02-21
Attending: INTERNAL MEDICINE
Payer: MEDICARE

## 2025-02-21 DIAGNOSIS — J44.9 CHRONIC OBSTRUCTIVE PULMONARY DISEASE, UNSPECIFIED COPD TYPE (HCC): ICD-10-CM

## 2025-02-21 LAB
EXPIRATORY TIME-POST: NORMAL
EXPIRATORY TIME: NORMAL
FEF 25-75 %CHNG: NORMAL
FEF 25-75 POST %PRED: NORMAL
FEF 25-75% %PRED-PRE: NORMAL
FEF 25-75% PRED: NORMAL
FEF 25-75-POST: NORMAL
FEF 25-75-PRE: NORMAL
FEV1 %PRED-POST: NORMAL
FEV1 %PRED-PRE: NORMAL
FEV1 PRED: NORMAL
FEV1-POST: NORMAL
FEV1-PRE: NORMAL
FEV1/FVC %PRED-POST: NORMAL
FEV1/FVC %PRED-PRE: NORMAL
FEV1/FVC PRED: NORMAL
FEV1/FVC-POST: NORMAL
FEV1/FVC-PRE: NORMAL
FVC %PRED-POST: NORMAL
FVC %PRED-PRE: NORMAL
FVC PRED: NORMAL
FVC-POST: NORMAL
FVC-PRE: NORMAL
PEF %PRED-POST: NORMAL
PEF %PRED-PRE: NORMAL
PEF PRED: NORMAL
PEF%CHNG: NORMAL
PEF-POST: NORMAL
PEF-PRE: NORMAL

## 2025-02-21 PROCEDURE — 94070 EVALUATION OF WHEEZING: CPT

## 2025-02-21 PROCEDURE — 6360000002 HC RX W HCPCS: Performed by: INTERNAL MEDICINE

## 2025-02-21 PROCEDURE — 94060 EVALUATION OF WHEEZING: CPT

## 2025-02-21 RX ORDER — ALBUTEROL SULFATE 0.83 MG/ML
2.5 SOLUTION RESPIRATORY (INHALATION) ONCE
Status: COMPLETED | OUTPATIENT
Start: 2025-02-21 | End: 2025-02-21

## 2025-02-21 RX ADMIN — METHACHOLINE CHLORIDE 100 MG: 100 POWDER, FOR SOLUTION RESPIRATORY (INHALATION) at 16:01

## 2025-02-21 RX ADMIN — ALBUTEROL SULFATE 2.5 MG: 2.5 SOLUTION RESPIRATORY (INHALATION) at 16:17

## 2025-02-21 NOTE — PROGRESS NOTES
BRONCHIAL / METHACHOLINE CHALLENGE CONTRAINDICATIONS REVIEWED WITH PATIENT PRIOR TO TESTING     Pt states they have none of the following ABSOLUTE CONTRAINDICATIONS    [x]  Known hypersensitivity to methacholine or other parasympathomimetic agents   [x]  FEV 1 of less than 50% normal predicted level or less than 1L (can be determined on baseline testing)   [x]  Myocardial infarction or stroke within last 3 months  [x]  Uncontrolled Hypertension (systolic BP>200 mmHg or diastolic BP>100 mmHg)   - 95/70 : current patient BP  [x]  Known or suspected aortic aneurism  [x]  Recent Eye surgery in which raised intracranial pressure would be harmful           Pt states they have none of the following RELATIVE CONTRAINDICATIONS    [x]   Moderate airflow limitation (reduced FEV1/forced vital capacity ratio) and FEV1 of less than 60% of the predicted normal value, or less than 1.5L. (can be determined on baseline testing)   [x]   Inability to perform acceptable or repeatable spirometry at baseline   [x]   Upper or lower respiratory tract infection           VITAS PRIOR TO TEST     SpO2: 97  HR: 65  BP: 95/70  RR: 18  Work of Breathing: normal  Breathsounds: clear

## 2025-03-05 ENCOUNTER — PREP FOR PROCEDURE (OUTPATIENT)
Dept: ORTHOPEDIC SURGERY | Age: 70
End: 2025-03-05

## 2025-03-05 DIAGNOSIS — Z96.652 STATUS POST TOTAL KNEE REPLACEMENT, LEFT: ICD-10-CM

## 2025-03-05 DIAGNOSIS — Z01.818 PRE-OP TESTING: Primary | ICD-10-CM

## 2025-03-05 PROBLEM — T84.033A MECHANICAL LOOSENING OF INTERNAL LEFT KNEE PROSTHETIC JOINT: Status: ACTIVE | Noted: 2025-03-05

## 2025-03-10 ENCOUNTER — TELEPHONE (OUTPATIENT)
Dept: ORTHOPEDIC SURGERY | Age: 70
End: 2025-03-10

## 2025-03-10 DIAGNOSIS — Z96.652 STATUS POST TOTAL KNEE REPLACEMENT, LEFT: Primary | ICD-10-CM

## 2025-03-10 RX ORDER — AMOXICILLIN 500 MG/1
CAPSULE ORAL
Qty: 4 CAPSULE | Refills: 0 | Status: SHIPPED | OUTPATIENT
Start: 2025-03-10

## 2025-03-10 NOTE — TELEPHONE ENCOUNTER
L TKA 1/24/22. Patient requesting antibiotics for dental procedure.  Left knee REV  scheduled 4/1/25

## 2025-03-13 ENCOUNTER — OFFICE VISIT (OUTPATIENT)
Dept: ONCOLOGY | Age: 70
End: 2025-03-13
Payer: MEDICARE

## 2025-03-13 ENCOUNTER — TELEPHONE (OUTPATIENT)
Dept: INFUSION THERAPY | Age: 70
End: 2025-03-13

## 2025-03-13 VITALS
DIASTOLIC BLOOD PRESSURE: 83 MMHG | HEART RATE: 64 BPM | RESPIRATION RATE: 18 BRPM | TEMPERATURE: 97.9 F | WEIGHT: 168.8 LBS | SYSTOLIC BLOOD PRESSURE: 132 MMHG | OXYGEN SATURATION: 98 % | BODY MASS INDEX: 32.97 KG/M2

## 2025-03-13 DIAGNOSIS — D69.1 DELTA STORAGE POOL DISEASE (HCC): Primary | ICD-10-CM

## 2025-03-13 PROCEDURE — 99214 OFFICE O/P EST MOD 30 MIN: CPT | Performed by: INTERNAL MEDICINE

## 2025-03-13 PROCEDURE — 3017F COLORECTAL CA SCREEN DOC REV: CPT | Performed by: INTERNAL MEDICINE

## 2025-03-13 PROCEDURE — G8417 CALC BMI ABV UP PARAM F/U: HCPCS | Performed by: INTERNAL MEDICINE

## 2025-03-13 PROCEDURE — 1036F TOBACCO NON-USER: CPT | Performed by: INTERNAL MEDICINE

## 2025-03-13 PROCEDURE — G8427 DOCREV CUR MEDS BY ELIG CLIN: HCPCS | Performed by: INTERNAL MEDICINE

## 2025-03-13 PROCEDURE — 99211 OFF/OP EST MAY X REQ PHY/QHP: CPT | Performed by: INTERNAL MEDICINE

## 2025-03-13 PROCEDURE — 1125F AMNT PAIN NOTED PAIN PRSNT: CPT | Performed by: INTERNAL MEDICINE

## 2025-03-13 PROCEDURE — 1123F ACP DISCUSS/DSCN MKR DOCD: CPT | Performed by: INTERNAL MEDICINE

## 2025-03-13 PROCEDURE — G8399 PT W/DXA RESULTS DOCUMENT: HCPCS | Performed by: INTERNAL MEDICINE

## 2025-03-13 PROCEDURE — 1090F PRES/ABSN URINE INCON ASSESS: CPT | Performed by: INTERNAL MEDICINE

## 2025-03-13 PROCEDURE — 1159F MED LIST DOCD IN RCRD: CPT | Performed by: INTERNAL MEDICINE

## 2025-03-13 NOTE — PROGRESS NOTES
history (Left, 08/04/2014); pr arthroplasty glenohumrl jt hemiarthroplasty (Left, 10/10/2018); Breast biopsy; Revision total knee arthroplasty (Left, 01/24/2022); eye surgery (2022); and Hysterectomy, total abdominal (1979).     CURRENT MEDICATIONS:  has a current medication list which includes the following prescription(s): amoxicillin, ezetimibe, ranolazine, atorvastatin, escitalopram, metoprolol succinate, tiotropium, clopidogrel, furosemide, isosorbide mononitrate, loratadine, nitroglycerin, pantoprazole, potassium chloride, nitrofurantoin, and phentermine.    ALLERGIES:  is allergic to claritin [loratadine] and demerol.    FAMILY HISTORY: Negative for any hematological or oncological conditions.     SOCIAL HISTORY:  reports that she quit smoking about 19 months ago. Her smoking use included cigarettes. She started smoking about 43 years ago. She has a 20.8 pack-year smoking history. She has never used smokeless tobacco. She reports current alcohol use. She reports that she does not use drugs.    REVIEW OF SYSTEMS:  General: no fever or night sweats, Weight is stable.  Eyes: No double or blurred vision.  Ears: no tinnitus or hearing problem,    Throat: no dysphagia or sore throat   Respiratory: No chest pain, no shortness of breath, no cough or hemoptysis.  Cardiovascular: Denies chest pain, PND or orthopnea. No L E swelling or palpitations.  Gastrointestinal:    No nausea or vomiting, abdominal pain, diarrhea or constipation.   Genitourinary: Denies dysuria, hematuria, frequency, urgency or incontinence.   Neurological: Denies headaches, decreased LOC, no sensory or motor focal deficits.  Musculoskeletal:  LE pain. No swelling.   Skin: There are no rashes or bleeding.  Psychiatric:  No anxiety, no depression.   Endocrine: no diabetes or thyroid disease.   Hematologic: no bleeding , no adenopathy.     PHYSICAL EXAM: Shows a well appearing 69 y.o.-year-old female who is not in pain or distress. Vital Signs: Blood

## 2025-03-18 ENCOUNTER — HOSPITAL ENCOUNTER (OUTPATIENT)
Age: 70
Discharge: HOME | End: 2025-03-20
Payer: MEDICARE

## 2025-03-18 ENCOUNTER — HOSPITAL ENCOUNTER (OUTPATIENT)
Age: 70
Setting detail: SPECIMEN
Discharge: HOME | End: 2025-03-18

## 2025-03-18 ENCOUNTER — HOSPITAL ENCOUNTER (OUTPATIENT)
Dept: PREADMISSION TESTING | Age: 70
Discharge: HOME | End: 2025-03-22
Payer: MEDICARE

## 2025-03-18 VITALS
HEART RATE: 78 BPM | BODY MASS INDEX: 33.11 KG/M2 | TEMPERATURE: 98.2 F | RESPIRATION RATE: 14 BRPM | WEIGHT: 168.65 LBS | OXYGEN SATURATION: 99 % | DIASTOLIC BLOOD PRESSURE: 72 MMHG | SYSTOLIC BLOOD PRESSURE: 136 MMHG | HEIGHT: 60 IN

## 2025-03-18 DIAGNOSIS — Z01.818 PRE-OP TESTING: ICD-10-CM

## 2025-03-18 DIAGNOSIS — Z01.818 PREOP TESTING: Primary | ICD-10-CM

## 2025-03-18 LAB
ALBUMIN SERPL-MCNC: 4.5 G/DL (ref 3.5–5.2)
ALBUMIN/GLOB SERPL: 1.6 {RATIO} (ref 1–2.5)
ALP SERPL-CCNC: 79 U/L (ref 35–104)
ALT SERPL-CCNC: 20 U/L (ref 10–35)
ANION GAP SERPL CALCULATED.3IONS-SCNC: 11 MMOL/L (ref 9–16)
AST SERPL-CCNC: 20 U/L (ref 10–35)
BACTERIA URNS QL MICRO: NORMAL
BILIRUB SERPL-MCNC: 0.5 MG/DL (ref 0–1.2)
BILIRUB UR QL STRIP: NEGATIVE
BUN SERPL-MCNC: 12 MG/DL (ref 8–23)
CALCIUM SERPL-MCNC: 9.7 MG/DL (ref 8.6–10.4)
CASTS #/AREA URNS LPF: NORMAL /LPF (ref 0–8)
CHLORIDE SERPL-SCNC: 100 MMOL/L (ref 98–107)
CLARITY UR: CLEAR
CO2 SERPL-SCNC: 28 MMOL/L (ref 20–31)
COLOR UR: YELLOW
CREAT SERPL-MCNC: 0.9 MG/DL (ref 0.6–0.9)
EPI CELLS #/AREA URNS HPF: NORMAL /HPF (ref 0–5)
ERYTHROCYTE [DISTWIDTH] IN BLOOD BY AUTOMATED COUNT: 13.5 % (ref 11.8–14.4)
EST. AVERAGE GLUCOSE BLD GHB EST-MCNC: 97 MG/DL
GFR, ESTIMATED: 69 ML/MIN/1.73M2
GLUCOSE SERPL-MCNC: 101 MG/DL (ref 74–99)
GLUCOSE UR STRIP-MCNC: NEGATIVE MG/DL
HBA1C MFR BLD: 5 % (ref 4–6)
HCT VFR BLD AUTO: 40 % (ref 36.3–47.1)
HGB BLD-MCNC: 13.1 G/DL (ref 11.9–15.1)
HGB UR QL STRIP.AUTO: NEGATIVE
INR PPP: 1.1
KETONES UR STRIP-MCNC: NEGATIVE MG/DL
LEUKOCYTE ESTERASE UR QL STRIP: ABNORMAL
MCH RBC QN AUTO: 33.9 PG (ref 25.2–33.5)
MCHC RBC AUTO-ENTMCNC: 32.8 G/DL (ref 28.4–34.8)
MCV RBC AUTO: 103.6 FL (ref 82.6–102.9)
NITRITE UR QL STRIP: NEGATIVE
NRBC BLD-RTO: 0 PER 100 WBC
PARTIAL THROMBOPLASTIN TIME: 28.3 SEC (ref 23–36.5)
PH UR STRIP: 5.5 [PH] (ref 5–8)
PLATELET # BLD AUTO: 226 K/UL (ref 138–453)
PMV BLD AUTO: 9.4 FL (ref 8.1–13.5)
POTASSIUM SERPL-SCNC: 4.8 MMOL/L (ref 3.7–5.3)
PROT SERPL-MCNC: 7.3 G/DL (ref 6.6–8.7)
PROT UR STRIP-MCNC: NEGATIVE MG/DL
PROTHROMBIN TIME: 14.2 SEC (ref 11.7–14.9)
RBC # BLD AUTO: 3.86 M/UL (ref 3.95–5.11)
RBC #/AREA URNS HPF: NORMAL /HPF (ref 0–4)
SODIUM SERPL-SCNC: 139 MMOL/L (ref 136–145)
SP GR UR STRIP: 1.01 (ref 1–1.03)
UROBILINOGEN UR STRIP-ACNC: NORMAL EU/DL (ref 0–1)
WBC #/AREA URNS HPF: NORMAL /HPF (ref 0–5)
WBC OTHER # BLD: 8.5 K/UL (ref 3.5–11.3)

## 2025-03-18 PROCEDURE — 71046 X-RAY EXAM CHEST 2 VIEWS: CPT

## 2025-03-18 PROCEDURE — 83036 HEMOGLOBIN GLYCOSYLATED A1C: CPT

## 2025-03-18 PROCEDURE — 86901 BLOOD TYPING SEROLOGIC RH(D): CPT

## 2025-03-18 PROCEDURE — 85610 PROTHROMBIN TIME: CPT

## 2025-03-18 PROCEDURE — 81001 URINALYSIS AUTO W/SCOPE: CPT

## 2025-03-18 PROCEDURE — 86850 RBC ANTIBODY SCREEN: CPT

## 2025-03-18 PROCEDURE — 36415 COLL VENOUS BLD VENIPUNCTURE: CPT

## 2025-03-18 PROCEDURE — 85027 COMPLETE CBC AUTOMATED: CPT

## 2025-03-18 PROCEDURE — 86900 BLOOD TYPING SEROLOGIC ABO: CPT

## 2025-03-18 PROCEDURE — 85730 THROMBOPLASTIN TIME PARTIAL: CPT

## 2025-03-18 PROCEDURE — 80053 COMPREHEN METABOLIC PANEL: CPT

## 2025-03-18 PROCEDURE — 93005 ELECTROCARDIOGRAM TRACING: CPT

## 2025-03-18 NOTE — PRE-PROCEDURE INSTRUCTIONS
that you remove your dentures prior to surgery.    Do not wear any jewelry or body piercings day of surgery.  Also, NO lotion, perfume or deodorant to be used the day of surgery.  No nail polish on the operative extremity (arm/leg surgeries)    If you are staying overnight with us, please bring a small bag of necessary personal items.    Please wear loose, comfortable clothing.  If you are potentially going to have a cast or brace bring clothing that will fit over them.                                                                                                          In case of illness - If you have cold or flu like symptoms (high fever, runny nose, sore throat, cough, etc.) rash, nausea, vomiting, loose stools, and/or recent contact with someone who has a contagious disease (chicken pox, measles, etc.) Please call your doctor before coming to the hospital.         Day of Surgery/Procedure:    As a patient at [facility] you can expect quality medical and nursing care that is centered on your individual needs.  Our goal is to make your surgical experience as comfortable as possible    .  Transportation After Your Surgery/Procedure:    You will need a friend or family member to drive you home after your procedure.  Your  must be 18 years of age or older and able to sign off on your discharge instructions.  A taxi cab or any other form of public transportation is not acceptable.  Your friend or family member must stay at the hospital throughout your procedure.    Someone must remain with you for the first 24 hours after your surgery if you receive anesthesia or medication.  If you do not have someone to stay with you, your procedure may be cancelled.      If you have any other questions regarding your procedure or the day of surgery, please call [phone]      _________________________  ____________________________  Signature (Patient)              Signature (Provider) & date

## 2025-03-18 NOTE — H&P
PAT Progress Note    Pt Name: Mavis Ann  MRN: [id number]  YOB: 1955  Date of evaluation: 3/18/2025      [x] Called to PAT. I spoke to the patient, Mavis Ann, a 69 y.o. female, who is scheduled for an upcoming LEFT REVISION TOTAL KNEE ARTHROPLASTY POSSIBLE SPACER, POSSIBLE HARDWARE REMOVAL by Owen Blake,  for Mechanical loosening of internal left knee prosthetic joint on 04/01/2025 at 1200.     [x] The patient has an upcoming primary care appointment with BELKYS David CNP dated 03/20/2025 to be used for an Interval History and Physical Note the day of surgery. She recently was seen by oncology Dr. Crooks for surgical clearance as well- note in Epic.     Extensive PMH including delta storage pool disease with previous DVT 2014, depression and anxiety, osteoarthritis, uterine rupture with previous blood transfusions, Recurrent UTIs in the past, GERD, CAD with total of 2 stents September 2023, congestive heart failure, (follows with [facility] cardiology), COPD (follows with pulmonary). Patient has received cardiology, pulmonology, dental, and hematology clearance for procedure.     Functional Capacity per pt:  1) Pt is able to walk 1 city blocks on level ground without SOB.  2) Pt is not able to climb 2 flights of stairs without SOB.  3) Pt is not able to walk up a hill for 1-2 city blocks without SOB.    Activity limited currently due to knee pain/instability.    Vital signs: /72   Pulse 78   Temp 98.2 °F (36.8 °C) (Oral)   Resp 14   Ht 1.524 m (5')   Wt 76.5 kg (168 lb 10.4 oz)   SpO2 99%   BMI 32.94 kg/m²     Physical Exam:     General Appearance:  Alert, well appearing, and in no acute distress.  Mental status: Oriented to person, place, and time.  Lungs: Bilateral equal air entry, clear to auscultation, no wheezing, rales or rhonchi, and normal effort.  Cardiovascular: Normal rate, regular rhythm, no murmur, gallop, or rub.  Abdomen: Obese Soft, nontender,

## 2025-03-18 NOTE — PROGRESS NOTES
PAT Progress Note    Pt Name: Mavis Ann  MRN: [id number]  YOB: 1955  Date of evaluation: 3/18/2025      [x] Called to PAT. I spoke to the patient, Mavis Ann, a 69 y.o. female, who is scheduled for an upcoming LEFT REVISION TOTAL KNEE ARTHROPLASTY POSSIBLE SPACER, POSSIBLE HARDWARE REMOVAL by Owen Blake,  for Mechanical loosening of internal left knee prosthetic joint on 04/01/2025 at 1200.     [x] The patient has an upcoming primary care appointment with BELKYS David CNP dated 03/20/2025 to be used for an Interval History and Physical Note the day of surgery. She recently was seen by oncology Dr. Crooks for surgical clearance as well- note in Epic.     Extensive PMH including delta storage pool disease with previous DVT 2014, depression and anxiety, osteoarthritis, uterine rupture with previous blood transfusions, Recurrent UTIs in the past, GERD, CAD with total of 2 stents September 2023, congestive heart failure, (follows with [facility] cardiology), COPD (follows with pulmonary). Patient has received cardiology, pulmonology, dental, and hematology clearance for procedure.     Functional Capacity per pt:  1) Pt is able to walk 1 city blocks on level ground without SOB.  2) Pt is not able to climb 2 flights of stairs without SOB.  3) Pt is not able to walk up a hill for 1-2 city blocks without SOB.    Activity limited currently due to knee pain/instability.    Vital signs: /72   Pulse 78   Temp 98.2 °F (36.8 °C) (Oral)   Resp 14   Ht 1.524 m (5')   Wt 76.5 kg (168 lb 10.4 oz)   SpO2 99%   BMI 32.94 kg/m²     Physical Exam:     General Appearance:  Alert, well appearing, and in no acute distress.  Mental status: Oriented to person, place, and time.  Lungs: Bilateral equal air entry, clear to auscultation, no wheezing, rales or rhonchi, and normal effort.  Cardiovascular: Normal rate, regular rhythm, no murmur, gallop, or rub.  Abdomen: Obese Soft, nontender,  The patient is a 38y Male complaining of toothache.

## 2025-03-19 LAB
ABO + RH BLD: NORMAL
ARM BAND NUMBER: NORMAL
BLOOD BANK SAMPLE EXPIRATION: NORMAL
BLOOD GROUP ANTIBODIES SERPL: NEGATIVE
EKG ATRIAL RATE: 62 BPM
EKG P AXIS: 30 DEGREES
EKG P-R INTERVAL: 146 MS
EKG Q-T INTERVAL: 446 MS
EKG QRS DURATION: 78 MS
EKG QTC CALCULATION (BAZETT): 452 MS
EKG R AXIS: 57 DEGREES
EKG T AXIS: 69 DEGREES
EKG VENTRICULAR RATE: 62 BPM

## 2025-03-20 LAB
MRSA, DNA, NASAL: NEGATIVE
SPECIMEN DESCRIPTION: NORMAL

## 2025-03-27 ENCOUNTER — CASE MANAGEMENT (OUTPATIENT)
Dept: CASE MANAGEMENT | Age: 70
End: 2025-03-27

## 2025-03-27 NOTE — PROGRESS NOTES
Pre-op phone call    Spoke with patient preop concerning:     Procedure left  Knee arthroplasty REVISION WITH POSSIBLE ATB SPACER with Dr. CRAIG  on  04/01/2025 .    DME: Patient has shower chair and walker.    Therapy after surgery: [facility] WITH [facility] FOR SN/PT/OT, PT USED THEIR SERVICE WITH LTKA REVISION WITH DR ELLIS 01/24/2022 AND SOUL LIKE TO RESUME CARE.    Other concerns: PT QUESTIONING IF SHE'S STAYING OVERNIGHT OR GOING HOME SAME DAY AND INSTRUCTED PT IT DEPENDS ON THE SURGERY OUTCOME.

## 2025-03-31 ENCOUNTER — ANESTHESIA EVENT (OUTPATIENT)
Dept: OPERATING ROOM | Age: 70
DRG: 468 | End: 2025-03-31
Payer: MEDICARE

## 2025-04-01 ENCOUNTER — APPOINTMENT (OUTPATIENT)
Dept: GENERAL RADIOLOGY | Age: 70
DRG: 468 | End: 2025-04-01
Attending: ORTHOPAEDIC SURGERY
Payer: MEDICARE

## 2025-04-01 ENCOUNTER — ANESTHESIA (OUTPATIENT)
Dept: OPERATING ROOM | Age: 70
DRG: 468 | End: 2025-04-01
Payer: MEDICARE

## 2025-04-01 ENCOUNTER — HOSPITAL ENCOUNTER (INPATIENT)
Age: 70
LOS: 1 days | Discharge: HOME HEALTH | DRG: 468 | End: 2025-04-01
Attending: ORTHOPAEDIC SURGERY | Admitting: ORTHOPAEDIC SURGERY
Payer: MEDICARE

## 2025-04-01 VITALS
RESPIRATION RATE: 18 BRPM | DIASTOLIC BLOOD PRESSURE: 68 MMHG | HEIGHT: 60 IN | OXYGEN SATURATION: 96 % | BODY MASS INDEX: 32.98 KG/M2 | WEIGHT: 168 LBS | SYSTOLIC BLOOD PRESSURE: 100 MMHG | HEART RATE: 64 BPM | TEMPERATURE: 97.9 F

## 2025-04-01 DIAGNOSIS — T84.033A: ICD-10-CM

## 2025-04-01 DIAGNOSIS — Z96.652 STATUS POST TOTAL KNEE REPLACEMENT, LEFT: ICD-10-CM

## 2025-04-01 DIAGNOSIS — Z96.652 STATUS POST TOTAL LEFT KNEE REPLACEMENT: Primary | ICD-10-CM

## 2025-04-01 PROCEDURE — 2500000003 HC RX 250 WO HCPCS: Performed by: ORTHOPAEDIC SURGERY

## 2025-04-01 PROCEDURE — 97110 THERAPEUTIC EXERCISES: CPT

## 2025-04-01 PROCEDURE — 3600000015 HC SURGERY LEVEL 5 ADDTL 15MIN: Performed by: ORTHOPAEDIC SURGERY

## 2025-04-01 PROCEDURE — 87205 SMEAR GRAM STAIN: CPT

## 2025-04-01 PROCEDURE — 73560 X-RAY EXAM OF KNEE 1 OR 2: CPT

## 2025-04-01 PROCEDURE — 2709999900 HC NON-CHARGEABLE SUPPLY: Performed by: ORTHOPAEDIC SURGERY

## 2025-04-01 PROCEDURE — 6360000002 HC RX W HCPCS: Performed by: ANESTHESIOLOGY

## 2025-04-01 PROCEDURE — 2720000010 HC SURG SUPPLY STERILE: Performed by: ORTHOPAEDIC SURGERY

## 2025-04-01 PROCEDURE — 1200000000 HC SEMI PRIVATE

## 2025-04-01 PROCEDURE — 6370000000 HC RX 637 (ALT 250 FOR IP): Performed by: ANESTHESIOLOGY

## 2025-04-01 PROCEDURE — 97166 OT EVAL MOD COMPLEX 45 MIN: CPT

## 2025-04-01 PROCEDURE — 3700000001 HC ADD 15 MINUTES (ANESTHESIA): Performed by: ORTHOPAEDIC SURGERY

## 2025-04-01 PROCEDURE — 2580000003 HC RX 258: Performed by: ORTHOPAEDIC SURGERY

## 2025-04-01 PROCEDURE — 6360000002 HC RX W HCPCS: Performed by: ORTHOPAEDIC SURGERY

## 2025-04-01 PROCEDURE — 87176 TISSUE HOMOGENIZATION CULTR: CPT

## 2025-04-01 PROCEDURE — 7100000001 HC PACU RECOVERY - ADDTL 15 MIN: Performed by: ORTHOPAEDIC SURGERY

## 2025-04-01 PROCEDURE — 2580000003 HC RX 258: Performed by: ANESTHESIOLOGY

## 2025-04-01 PROCEDURE — 6370000000 HC RX 637 (ALT 250 FOR IP)

## 2025-04-01 PROCEDURE — 73562 X-RAY EXAM OF KNEE 3: CPT

## 2025-04-01 PROCEDURE — 0SRD0J9 REPLACEMENT OF LEFT KNEE JOINT WITH SYNTHETIC SUBSTITUTE, CEMENTED, OPEN APPROACH: ICD-10-PCS | Performed by: ORTHOPAEDIC SURGERY

## 2025-04-01 PROCEDURE — 7100000010 HC PHASE II RECOVERY - FIRST 15 MIN: Performed by: ORTHOPAEDIC SURGERY

## 2025-04-01 PROCEDURE — 87075 CULTR BACTERIA EXCEPT BLOOD: CPT

## 2025-04-01 PROCEDURE — 97530 THERAPEUTIC ACTIVITIES: CPT

## 2025-04-01 PROCEDURE — 2500000003 HC RX 250 WO HCPCS: Performed by: NURSE ANESTHETIST, CERTIFIED REGISTERED

## 2025-04-01 PROCEDURE — C1776 JOINT DEVICE (IMPLANTABLE): HCPCS | Performed by: ORTHOPAEDIC SURGERY

## 2025-04-01 PROCEDURE — 6360000002 HC RX W HCPCS

## 2025-04-01 PROCEDURE — 97535 SELF CARE MNGMENT TRAINING: CPT

## 2025-04-01 PROCEDURE — 87070 CULTURE OTHR SPECIMN AEROBIC: CPT

## 2025-04-01 PROCEDURE — 97162 PT EVAL MOD COMPLEX 30 MIN: CPT

## 2025-04-01 PROCEDURE — 0SPD0JZ REMOVAL OF SYNTHETIC SUBSTITUTE FROM LEFT KNEE JOINT, OPEN APPROACH: ICD-10-PCS | Performed by: ORTHOPAEDIC SURGERY

## 2025-04-01 PROCEDURE — 6360000002 HC RX W HCPCS: Performed by: NURSE ANESTHETIST, CERTIFIED REGISTERED

## 2025-04-01 PROCEDURE — 3700000000 HC ANESTHESIA ATTENDED CARE: Performed by: ORTHOPAEDIC SURGERY

## 2025-04-01 PROCEDURE — C1713 ANCHOR/SCREW BN/BN,TIS/BN: HCPCS | Performed by: ORTHOPAEDIC SURGERY

## 2025-04-01 PROCEDURE — 2580000003 HC RX 258: Performed by: NURSE ANESTHETIST, CERTIFIED REGISTERED

## 2025-04-01 PROCEDURE — 7100000000 HC PACU RECOVERY - FIRST 15 MIN: Performed by: ORTHOPAEDIC SURGERY

## 2025-04-01 PROCEDURE — 3600000005 HC SURGERY LEVEL 5 BASE: Performed by: ORTHOPAEDIC SURGERY

## 2025-04-01 PROCEDURE — 7100000011 HC PHASE II RECOVERY - ADDTL 15 MIN: Performed by: ORTHOPAEDIC SURGERY

## 2025-04-01 DEVICE — IMPLANTABLE DEVICE
Type: IMPLANTABLE DEVICE | Site: KNEE | Status: FUNCTIONAL
Brand: PERSONA®

## 2025-04-01 DEVICE — IMPLANTABLE DEVICE
Type: IMPLANTABLE DEVICE | Site: KNEE | Status: FUNCTIONAL
Brand: PERSONA® TRABECULAR METAL®

## 2025-04-01 DEVICE — IMPLANTABLE DEVICE
Type: IMPLANTABLE DEVICE | Site: KNEE | Status: FUNCTIONAL
Brand: REFOBACIN® BONE CEMENT R

## 2025-04-01 DEVICE — SURFACE ARTC H10MM LT KNEE VIVACIT-E FIX BEAR CONSTRN POST: Type: IMPLANTABLE DEVICE | Site: KNEE | Status: FUNCTIONAL

## 2025-04-01 DEVICE — HEADLESS TROCHAR PIN 75MM
Type: IMPLANTABLE DEVICE | Site: KNEE | Status: FUNCTIONAL
Brand: ZUK

## 2025-04-01 RX ORDER — CEFAZOLIN SODIUM/WATER 2 G/20 ML
2000 SYRINGE (ML) INTRAVENOUS EVERY 8 HOURS
Status: DISCONTINUED | OUTPATIENT
Start: 2025-04-01 | End: 2025-04-01 | Stop reason: HOSPADM

## 2025-04-01 RX ORDER — ROCURONIUM BROMIDE 10 MG/ML
INJECTION, SOLUTION INTRAVENOUS
Status: DISCONTINUED | OUTPATIENT
Start: 2025-04-01 | End: 2025-04-01 | Stop reason: SDUPTHER

## 2025-04-01 RX ORDER — LIDOCAINE HYDROCHLORIDE 20 MG/ML
INJECTION, SOLUTION EPIDURAL; INFILTRATION; INTRACAUDAL; PERINEURAL
Status: DISCONTINUED | OUTPATIENT
Start: 2025-04-01 | End: 2025-04-01 | Stop reason: SDUPTHER

## 2025-04-01 RX ORDER — PROCHLORPERAZINE EDISYLATE 5 MG/ML
10 INJECTION INTRAMUSCULAR; INTRAVENOUS
Status: DISCONTINUED | OUTPATIENT
Start: 2025-04-01 | End: 2025-04-01 | Stop reason: HOSPADM

## 2025-04-01 RX ORDER — FENTANYL CITRATE 50 UG/ML
INJECTION, SOLUTION INTRAMUSCULAR; INTRAVENOUS
Status: DISCONTINUED | OUTPATIENT
Start: 2025-04-01 | End: 2025-04-01 | Stop reason: SDUPTHER

## 2025-04-01 RX ORDER — ACETAMINOPHEN 500 MG
1000 TABLET ORAL ONCE
Status: COMPLETED | OUTPATIENT
Start: 2025-04-01 | End: 2025-04-01

## 2025-04-01 RX ORDER — ONDANSETRON 4 MG/1
4 TABLET, ORALLY DISINTEGRATING ORAL EVERY 8 HOURS PRN
Status: DISCONTINUED | OUTPATIENT
Start: 2025-04-01 | End: 2025-04-01 | Stop reason: HOSPADM

## 2025-04-01 RX ORDER — METOPROLOL SUCCINATE 50 MG/1
50 TABLET, EXTENDED RELEASE ORAL DAILY
Status: DISCONTINUED | OUTPATIENT
Start: 2025-04-01 | End: 2025-04-01 | Stop reason: HOSPADM

## 2025-04-01 RX ORDER — CLOPIDOGREL BISULFATE 75 MG/1
75 TABLET ORAL DAILY
Status: DISCONTINUED | OUTPATIENT
Start: 2025-04-01 | End: 2025-04-01 | Stop reason: HOSPADM

## 2025-04-01 RX ORDER — CYCLOBENZAPRINE HCL 10 MG
10 TABLET ORAL EVERY 12 HOURS PRN
Status: DISCONTINUED | OUTPATIENT
Start: 2025-04-01 | End: 2025-04-01 | Stop reason: HOSPADM

## 2025-04-01 RX ORDER — SODIUM CHLORIDE 0.9 % (FLUSH) 0.9 %
5-40 SYRINGE (ML) INJECTION PRN
Status: DISCONTINUED | OUTPATIENT
Start: 2025-04-01 | End: 2025-04-01 | Stop reason: HOSPADM

## 2025-04-01 RX ORDER — GABAPENTIN 300 MG/1
300 CAPSULE ORAL ONCE
Status: COMPLETED | OUTPATIENT
Start: 2025-04-01 | End: 2025-04-01

## 2025-04-01 RX ORDER — SODIUM CHLORIDE 9 MG/ML
INJECTION, SOLUTION INTRAVENOUS CONTINUOUS
Status: DISCONTINUED | OUTPATIENT
Start: 2025-04-01 | End: 2025-04-01 | Stop reason: HOSPADM

## 2025-04-01 RX ORDER — PHENYLEPHRINE HCL IN 0.9% NACL 1 MG/10 ML
SYRINGE (ML) INTRAVENOUS
Status: DISCONTINUED | OUTPATIENT
Start: 2025-04-01 | End: 2025-04-01 | Stop reason: SDUPTHER

## 2025-04-01 RX ORDER — DOCUSATE SODIUM 100 MG/1
100 CAPSULE, LIQUID FILLED ORAL 2 TIMES DAILY
Qty: 30 CAPSULE | Refills: 0 | Status: SHIPPED | OUTPATIENT
Start: 2025-04-01

## 2025-04-01 RX ORDER — RANOLAZINE 500 MG/1
500 TABLET, EXTENDED RELEASE ORAL 2 TIMES DAILY
Status: DISCONTINUED | OUTPATIENT
Start: 2025-04-01 | End: 2025-04-01 | Stop reason: HOSPADM

## 2025-04-01 RX ORDER — DEXAMETHASONE SODIUM PHOSPHATE 10 MG/ML
INJECTION, SOLUTION INTRAMUSCULAR; INTRAVENOUS
Status: DISCONTINUED | OUTPATIENT
Start: 2025-04-01 | End: 2025-04-01 | Stop reason: SDUPTHER

## 2025-04-01 RX ORDER — OXYMETAZOLINE HYDROCHLORIDE 0.05 G/100ML
2 SPRAY NASAL 2 TIMES DAILY PRN
Status: DISCONTINUED | OUTPATIENT
Start: 2025-04-01 | End: 2025-04-01 | Stop reason: HOSPADM

## 2025-04-01 RX ORDER — METOCLOPRAMIDE HYDROCHLORIDE 5 MG/ML
10 INJECTION INTRAMUSCULAR; INTRAVENOUS
Status: DISCONTINUED | OUTPATIENT
Start: 2025-04-01 | End: 2025-04-01 | Stop reason: HOSPADM

## 2025-04-01 RX ORDER — EPHEDRINE SULFATE/0.9% NACL/PF 25 MG/5 ML
SYRINGE (ML) INTRAVENOUS
Status: DISCONTINUED | OUTPATIENT
Start: 2025-04-01 | End: 2025-04-01 | Stop reason: SDUPTHER

## 2025-04-01 RX ORDER — ATORVASTATIN CALCIUM 40 MG/1
40 TABLET, FILM COATED ORAL NIGHTLY
Status: DISCONTINUED | OUTPATIENT
Start: 2025-04-01 | End: 2025-04-01 | Stop reason: HOSPADM

## 2025-04-01 RX ORDER — HYDROMORPHONE HYDROCHLORIDE 1 MG/ML
0.5 INJECTION, SOLUTION INTRAMUSCULAR; INTRAVENOUS; SUBCUTANEOUS EVERY 5 MIN PRN
Status: DISCONTINUED | OUTPATIENT
Start: 2025-04-01 | End: 2025-04-01 | Stop reason: HOSPADM

## 2025-04-01 RX ORDER — CYCLOBENZAPRINE HCL 10 MG
10 TABLET ORAL 3 TIMES DAILY PRN
Qty: 42 TABLET | Refills: 0 | Status: SHIPPED | OUTPATIENT
Start: 2025-04-01 | End: 2025-04-15

## 2025-04-01 RX ORDER — BENZONATATE 100 MG/1
100 CAPSULE ORAL 3 TIMES DAILY PRN
Status: DISCONTINUED | OUTPATIENT
Start: 2025-04-01 | End: 2025-04-01 | Stop reason: HOSPADM

## 2025-04-01 RX ORDER — ISOSORBIDE MONONITRATE 60 MG/1
60 TABLET, EXTENDED RELEASE ORAL DAILY
Status: DISCONTINUED | OUTPATIENT
Start: 2025-04-02 | End: 2025-04-01 | Stop reason: HOSPADM

## 2025-04-01 RX ORDER — OXYCODONE AND ACETAMINOPHEN 5; 325 MG/1; MG/1
1-2 TABLET ORAL EVERY 6 HOURS PRN
Qty: 56 TABLET | Refills: 0 | Status: SHIPPED | OUTPATIENT
Start: 2025-04-01 | End: 2025-04-15

## 2025-04-01 RX ORDER — SODIUM CHLORIDE, SODIUM LACTATE, POTASSIUM CHLORIDE, CALCIUM CHLORIDE 600; 310; 30; 20 MG/100ML; MG/100ML; MG/100ML; MG/100ML
INJECTION, SOLUTION INTRAVENOUS CONTINUOUS
Status: DISCONTINUED | OUTPATIENT
Start: 2025-04-01 | End: 2025-04-01 | Stop reason: HOSPADM

## 2025-04-01 RX ORDER — ACETAMINOPHEN 325 MG/1
650 TABLET ORAL EVERY 6 HOURS
Status: DISCONTINUED | OUTPATIENT
Start: 2025-04-01 | End: 2025-04-01 | Stop reason: HOSPADM

## 2025-04-01 RX ORDER — OXYCODONE HYDROCHLORIDE 5 MG/1
10 TABLET ORAL EVERY 4 HOURS PRN
Status: DISCONTINUED | OUTPATIENT
Start: 2025-04-01 | End: 2025-04-01 | Stop reason: HOSPADM

## 2025-04-01 RX ORDER — LIDOCAINE HYDROCHLORIDE 10 MG/ML
1 INJECTION, SOLUTION EPIDURAL; INFILTRATION; INTRACAUDAL; PERINEURAL
Status: DISCONTINUED | OUTPATIENT
Start: 2025-04-02 | End: 2025-04-01 | Stop reason: HOSPADM

## 2025-04-01 RX ORDER — NALOXONE HYDROCHLORIDE 0.4 MG/ML
INJECTION, SOLUTION INTRAMUSCULAR; INTRAVENOUS; SUBCUTANEOUS PRN
Status: DISCONTINUED | OUTPATIENT
Start: 2025-04-01 | End: 2025-04-01 | Stop reason: HOSPADM

## 2025-04-01 RX ORDER — POTASSIUM CHLORIDE 1500 MG/1
20 TABLET, EXTENDED RELEASE ORAL DAILY
Status: DISCONTINUED | OUTPATIENT
Start: 2025-04-01 | End: 2025-04-01 | Stop reason: HOSPADM

## 2025-04-01 RX ORDER — SODIUM CHLORIDE 0.9 % (FLUSH) 0.9 %
5-40 SYRINGE (ML) INJECTION EVERY 12 HOURS SCHEDULED
Status: DISCONTINUED | OUTPATIENT
Start: 2025-04-01 | End: 2025-04-01 | Stop reason: HOSPADM

## 2025-04-01 RX ORDER — EZETIMIBE 10 MG/1
10 TABLET ORAL DAILY
Status: DISCONTINUED | OUTPATIENT
Start: 2025-04-01 | End: 2025-04-01 | Stop reason: HOSPADM

## 2025-04-01 RX ORDER — BISACODYL 5 MG/1
5 TABLET, DELAYED RELEASE ORAL DAILY PRN
Status: DISCONTINUED | OUTPATIENT
Start: 2025-04-01 | End: 2025-04-01 | Stop reason: HOSPADM

## 2025-04-01 RX ORDER — SODIUM CHLORIDE 9 MG/ML
INJECTION, SOLUTION INTRAVENOUS PRN
Status: DISCONTINUED | OUTPATIENT
Start: 2025-04-01 | End: 2025-04-01 | Stop reason: HOSPADM

## 2025-04-01 RX ORDER — DIPHENHYDRAMINE HYDROCHLORIDE 50 MG/ML
12.5 INJECTION, SOLUTION INTRAMUSCULAR; INTRAVENOUS
Status: DISCONTINUED | OUTPATIENT
Start: 2025-04-01 | End: 2025-04-01 | Stop reason: HOSPADM

## 2025-04-01 RX ORDER — OXYCODONE HYDROCHLORIDE 5 MG/1
5 TABLET ORAL
Status: COMPLETED | OUTPATIENT
Start: 2025-04-01 | End: 2025-04-01

## 2025-04-01 RX ORDER — VANCOMYCIN HYDROCHLORIDE 1 G/20ML
INJECTION, POWDER, LYOPHILIZED, FOR SOLUTION INTRAVENOUS PRN
Status: DISCONTINUED | OUTPATIENT
Start: 2025-04-01 | End: 2025-04-01 | Stop reason: ALTCHOICE

## 2025-04-01 RX ORDER — PROPOFOL 10 MG/ML
INJECTION, EMULSION INTRAVENOUS
Status: DISCONTINUED | OUTPATIENT
Start: 2025-04-01 | End: 2025-04-01 | Stop reason: SDUPTHER

## 2025-04-01 RX ORDER — VANCOMYCIN HYDROCHLORIDE 1 G/20ML
INJECTION, POWDER, LYOPHILIZED, FOR SOLUTION INTRAVENOUS
Status: DISCONTINUED
Start: 2025-04-01 | End: 2025-04-01 | Stop reason: SDUPTHER

## 2025-04-01 RX ORDER — OXYCODONE HYDROCHLORIDE 5 MG/1
5 TABLET ORAL EVERY 4 HOURS PRN
Status: DISCONTINUED | OUTPATIENT
Start: 2025-04-01 | End: 2025-04-01 | Stop reason: HOSPADM

## 2025-04-01 RX ORDER — CALCIUM CARBONATE 500 MG/1
500 TABLET, CHEWABLE ORAL 3 TIMES DAILY PRN
Status: DISCONTINUED | OUTPATIENT
Start: 2025-04-01 | End: 2025-04-01 | Stop reason: HOSPADM

## 2025-04-01 RX ORDER — ONDANSETRON 2 MG/ML
INJECTION INTRAMUSCULAR; INTRAVENOUS
Status: DISCONTINUED | OUTPATIENT
Start: 2025-04-01 | End: 2025-04-01 | Stop reason: SDUPTHER

## 2025-04-01 RX ORDER — FUROSEMIDE 20 MG/1
20 TABLET ORAL DAILY
Status: DISCONTINUED | OUTPATIENT
Start: 2025-04-01 | End: 2025-04-01 | Stop reason: HOSPADM

## 2025-04-01 RX ORDER — ONDANSETRON 2 MG/ML
4 INJECTION INTRAMUSCULAR; INTRAVENOUS EVERY 6 HOURS PRN
Status: DISCONTINUED | OUTPATIENT
Start: 2025-04-01 | End: 2025-04-01 | Stop reason: HOSPADM

## 2025-04-01 RX ORDER — ESCITALOPRAM OXALATE 10 MG/1
20 TABLET ORAL DAILY
Status: DISCONTINUED | OUTPATIENT
Start: 2025-04-02 | End: 2025-04-01 | Stop reason: HOSPADM

## 2025-04-01 RX ORDER — MEPERIDINE HYDROCHLORIDE 50 MG/ML
12.5 INJECTION INTRAMUSCULAR; INTRAVENOUS; SUBCUTANEOUS EVERY 5 MIN PRN
Status: DISCONTINUED | OUTPATIENT
Start: 2025-04-01 | End: 2025-04-01

## 2025-04-01 RX ORDER — TRANEXAMIC ACID 100 MG/ML
INJECTION, SOLUTION INTRAVENOUS
Status: DISCONTINUED
Start: 2025-04-01 | End: 2025-04-01 | Stop reason: HOSPADM

## 2025-04-01 RX ORDER — PHENTERMINE HYDROCHLORIDE 37.5 MG/1
37.5 TABLET ORAL
Status: DISCONTINUED | OUTPATIENT
Start: 2025-04-02 | End: 2025-04-01 | Stop reason: HOSPADM

## 2025-04-01 RX ORDER — SODIUM CHLORIDE 9 MG/ML
INJECTION, SOLUTION INTRAVENOUS CONTINUOUS
Status: DISCONTINUED | OUTPATIENT
Start: 2025-04-01 | End: 2025-04-01

## 2025-04-01 RX ORDER — CELECOXIB 200 MG/1
200 CAPSULE ORAL ONCE
Status: COMPLETED | OUTPATIENT
Start: 2025-04-01 | End: 2025-04-01

## 2025-04-01 RX ORDER — GLYCOPYRROLATE 0.2 MG/ML
INJECTION INTRAMUSCULAR; INTRAVENOUS
Status: DISCONTINUED | OUTPATIENT
Start: 2025-04-01 | End: 2025-04-01 | Stop reason: SDUPTHER

## 2025-04-01 RX ORDER — MAGNESIUM HYDROXIDE 1200 MG/15ML
LIQUID ORAL CONTINUOUS PRN
Status: COMPLETED | OUTPATIENT
Start: 2025-04-01 | End: 2025-04-01

## 2025-04-01 RX ORDER — FENTANYL CITRATE 50 UG/ML
25 INJECTION, SOLUTION INTRAMUSCULAR; INTRAVENOUS EVERY 5 MIN PRN
Status: DISCONTINUED | OUTPATIENT
Start: 2025-04-01 | End: 2025-04-01 | Stop reason: HOSPADM

## 2025-04-01 RX ADMIN — Medication 100 MCG: at 09:17

## 2025-04-01 RX ADMIN — Medication 100 MCG: at 11:09

## 2025-04-01 RX ADMIN — Medication 100 MCG: at 09:58

## 2025-04-01 RX ADMIN — TRANEXAMIC ACID 1 G: 100 INJECTION, SOLUTION INTRAVENOUS at 11:02

## 2025-04-01 RX ADMIN — ACETAMINOPHEN 1000 MG: 500 TABLET ORAL at 08:00

## 2025-04-01 RX ADMIN — ONDANSETRON 4 MG: 2 INJECTION, SOLUTION INTRAMUSCULAR; INTRAVENOUS at 10:50

## 2025-04-01 RX ADMIN — EPHEDRINE SULFATE 10 MG: 5 INJECTION INTRAVENOUS at 09:21

## 2025-04-01 RX ADMIN — GLYCOPYRROLATE 0.2 MG: 0.2 INJECTION INTRAMUSCULAR; INTRAVENOUS at 10:26

## 2025-04-01 RX ADMIN — DEXAMETHASONE SODIUM PHOSPHATE 10 MG: 10 INJECTION, SOLUTION INTRAMUSCULAR; INTRAVENOUS at 09:05

## 2025-04-01 RX ADMIN — Medication 100 MCG: at 11:07

## 2025-04-01 RX ADMIN — LIDOCAINE HYDROCHLORIDE 80 MG: 20 INJECTION, SOLUTION EPIDURAL; INFILTRATION; INTRACAUDAL; PERINEURAL at 09:00

## 2025-04-01 RX ADMIN — ACETAMINOPHEN 650 MG: 325 TABLET, FILM COATED ORAL at 15:56

## 2025-04-01 RX ADMIN — FENTANYL CITRATE 25 MCG: 50 INJECTION INTRAMUSCULAR; INTRAVENOUS at 10:20

## 2025-04-01 RX ADMIN — CELECOXIB 200 MG: 200 CAPSULE ORAL at 08:00

## 2025-04-01 RX ADMIN — Medication 100 MCG: at 11:29

## 2025-04-01 RX ADMIN — EPHEDRINE SULFATE 5 MG: 5 INJECTION INTRAVENOUS at 10:08

## 2025-04-01 RX ADMIN — Medication 2000 MG: at 15:57

## 2025-04-01 RX ADMIN — Medication 100 MCG: at 10:39

## 2025-04-01 RX ADMIN — ROCURONIUM BROMIDE 50 MG: 10 INJECTION, SOLUTION INTRAVENOUS at 09:00

## 2025-04-01 RX ADMIN — TRANEXAMIC ACID 1 G: 100 INJECTION, SOLUTION INTRAVENOUS at 09:28

## 2025-04-01 RX ADMIN — EPHEDRINE SULFATE 5 MG: 5 INJECTION INTRAVENOUS at 09:39

## 2025-04-01 RX ADMIN — SODIUM CHLORIDE, POTASSIUM CHLORIDE, SODIUM LACTATE AND CALCIUM CHLORIDE: 600; 310; 30; 20 INJECTION, SOLUTION INTRAVENOUS at 10:33

## 2025-04-01 RX ADMIN — EPHEDRINE SULFATE 5 MG: 5 INJECTION INTRAVENOUS at 09:27

## 2025-04-01 RX ADMIN — FENTANYL CITRATE 50 MCG: 50 INJECTION INTRAMUSCULAR; INTRAVENOUS at 09:00

## 2025-04-01 RX ADMIN — SODIUM CHLORIDE, POTASSIUM CHLORIDE, SODIUM LACTATE AND CALCIUM CHLORIDE: 600; 310; 30; 20 INJECTION, SOLUTION INTRAVENOUS at 08:40

## 2025-04-01 RX ADMIN — VANCOMYCIN HYDROCHLORIDE 1000 MG: 1 INJECTION, POWDER, LYOPHILIZED, FOR SOLUTION INTRAVENOUS at 08:40

## 2025-04-01 RX ADMIN — SUGAMMADEX 152 MG: 100 INJECTION, SOLUTION INTRAVENOUS at 11:14

## 2025-04-01 RX ADMIN — FENTANYL CITRATE 25 MCG: 50 INJECTION INTRAMUSCULAR; INTRAVENOUS at 10:50

## 2025-04-01 RX ADMIN — Medication 100 MCG: at 10:23

## 2025-04-01 RX ADMIN — Medication 100 MCG: at 09:06

## 2025-04-01 RX ADMIN — Medication 100 MCG: at 10:57

## 2025-04-01 RX ADMIN — Medication 100 MCG: at 10:34

## 2025-04-01 RX ADMIN — HYDROMORPHONE HYDROCHLORIDE 0.5 MG: 1 INJECTION, SOLUTION INTRAMUSCULAR; INTRAVENOUS; SUBCUTANEOUS at 12:10

## 2025-04-01 RX ADMIN — PROPOFOL 150 MG: 10 INJECTION, EMULSION INTRAVENOUS at 09:00

## 2025-04-01 RX ADMIN — Medication 100 MCG: at 10:31

## 2025-04-01 RX ADMIN — GABAPENTIN 300 MG: 300 CAPSULE ORAL at 08:00

## 2025-04-01 RX ADMIN — Medication 100 MCG: at 10:26

## 2025-04-01 RX ADMIN — OXYCODONE HYDROCHLORIDE 5 MG: 5 TABLET ORAL at 12:13

## 2025-04-01 ASSESSMENT — PAIN SCALES - GENERAL
PAINLEVEL_OUTOF10: 9
PAINLEVEL_OUTOF10: 6
PAINLEVEL_OUTOF10: 2

## 2025-04-01 ASSESSMENT — PAIN DESCRIPTION - LOCATION
LOCATION: KNEE

## 2025-04-01 ASSESSMENT — PAIN DESCRIPTION - ORIENTATION
ORIENTATION: LEFT

## 2025-04-01 ASSESSMENT — PAIN DESCRIPTION - DESCRIPTORS
DESCRIPTORS: ACHING
DESCRIPTORS: THROBBING
DESCRIPTORS: THROBBING
DESCRIPTORS: ACHING

## 2025-04-01 ASSESSMENT — PAIN DESCRIPTION - FREQUENCY
FREQUENCY: CONTINUOUS
FREQUENCY: CONTINUOUS

## 2025-04-01 ASSESSMENT — PAIN DESCRIPTION - PAIN TYPE
TYPE: SURGICAL PAIN
TYPE: SURGICAL PAIN

## 2025-04-01 ASSESSMENT — PAIN - FUNCTIONAL ASSESSMENT
PAIN_FUNCTIONAL_ASSESSMENT: PREVENTS OR INTERFERES SOME ACTIVE ACTIVITIES AND ADLS
PAIN_FUNCTIONAL_ASSESSMENT: 0-10

## 2025-04-01 NOTE — ANESTHESIA PRE PROCEDURE
Department of Anesthesiology  Preprocedure Note       Name:  Mavis Ann   Age:  70 y.o.  :  1955                                          MRN:  [id number]         Date:  2025      Surgeon: Surgeon(s):  Owen Blake DO    Procedure: Procedure(s):  LEFT REVISION TOTAL KNEE ARTHROPLASTY POSSIBLE SPACER, POSSIBLE HARDWARE REMOVAL    Medications prior to admission:   Prior to Admission medications    Medication Sig Start Date End Date Taking? Authorizing Provider   phentermine (ADIPEX-P) 37.5 MG tablet Take 1 tablet by mouth every morning (before breakfast) for 30 days. 3/20/25 4/19/25 Yes Lianet Agarwal APRN - CNP   amoxicillin (AMOXIL) 500 MG capsule Take 4 tablets 1 hour prior to a dental procedure 3/10/25  Yes Owen Blake DO   ezetimibe (ZETIA) 10 MG tablet Take 1 tablet by mouth daily   Yes Provider, MD Dexter   ranolazine (RANEXA) 500 MG extended release tablet Take 1 tablet by mouth 2 times daily 7/15/24  Yes Lianet Agarwal APRN - CNP   atorvastatin (LIPITOR) 40 MG tablet Take 1 tablet by mouth nightly 6/14/24 3/13/26 Yes Lianet Agarwal APRN - CNP   escitalopram (LEXAPRO) 20 MG tablet Take 1 tablet by mouth daily 4/15/24 3/13/26 Yes Lianet Agarwal APRN - CNP   metoprolol succinate (TOPROL XL) 50 MG extended release tablet Take 1 tablet by mouth 23  Yes Provider, MD Dexter   tiotropium (SPIRIVA HANDIHALER) 18 MCG inhalation capsule Inhale 1 capsule into the lungs daily 1/5/24 3/13/26 Yes Lianet Agarwal APRN - CNP   furosemide (LASIX) 20 MG tablet Take 1 tablet by mouth daily 23  Yes Provider, Historical, MD   isosorbide mononitrate (IMDUR) 60 MG extended release tablet Take 1 tablet by mouth daily 23  Yes Provider, Historical, MD   loratadine (CLARITIN REDITABS) 10 MG dissolvable tablet Take 1 tablet by mouth daily   Yes Provider, Historical, MD   nitroGLYCERIN (NITROSTAT) 0.4 MG SL tablet Place 1 tablet under the tongue every 5 minutes as needed 23

## 2025-04-01 NOTE — BRIEF OP NOTE
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]     * Status post total knee replacement, left [Z96.652]    Post-Op Diagnosis: Mechanical loosening of internal left knee prosthetic joint, aseptic       Procedure(s):  LEFT REVISION TOTAL KNEE ARTHROPLASTY    Surgeon(s):  Owen Blake DO    Assistant:  First Assistant: Magda Dunaway  Resident: Ibrahima Clark DO    Anesthesia: General    Estimated Blood Loss (mL): 100    Complications: None    Specimens:   ID Type Source Tests Collected by Time Destination   1 : LEFT KNEE #1 Tissue Joint, Knee CULTURE, TISSUE (WITH GRAM STAIN) Owen Blake DO 4/1/2025 0933    2 : LEFT KNEE #2 Tissue Joint, Knee CULTURE, TISSUE (WITH GRAM STAIN) Owen Blake DO 4/1/2025 0933        Implants:  Implant Name Type Inv. Item Serial No.  Lot No. LRB No. Used Action   CEMENT BNE 40GM W/ GENT HI VISC RADPQ FOR REV SURG - [id number]  CEMENT BNE 40GM W/ GENT HI VISC RADPQ FOR REV SURG  [employer]- [id number] Left 2 Implanted   COMPONENT FEM AUG 7 5 MM DSTL PERSONA - [id number]  COMPONENT FEM AUG 7 5 MM DSTL PERSONA  [employer]- [id number] Left 1 Implanted   PSN REV TIB FIXED KEEL CMT SZ C L - [id number]  PSN REV TIB FIXED KEEL CMT SZ C L  [employer]- [id number] Left 1 Implanted   PSN REV TM TIB CENTRAL CONE FIX TIB - [id number]  PSN REV TM TIB CENTRAL CONE FIX TIB  [employer]- [id number] Left 1 Implanted   PSN REV 3MM OFFSET STEM EXT [id number] - [id number]  PSN REV 3MM OFFSET STEM EXT [id number]  [employer]- [id number] Left 1 Implanted   COMPONENT TIB AUG REV CD 10 MM KNEE TI PERSONA - [id number]  COMPONENT TIB AUG REV CD 10 MM KNEE TI PERSONA  [employer]- [id number] Left 1

## 2025-04-01 NOTE — PROGRESS NOTES
Physical Therapy  Facility/Department: [facility]  Physical Therapy Initial Assessment    Name: Mavis Ann  : 1955  MRN: [id number]  Date of Service: 2025    Per H&P:70 y.o. female.There are no changes to the patient who is scheduled for LEFT REVISION TOTAL KNEE ARTHROPLASTY POSSIBLE SPACER, POSSIBLE HARDWARE REMOVAL by Owen Blake DO for Mechanical loosening of internal left knee prosthetic joint; Status post t*. The patient denies new health changes, fever, chills, wheezing, cough, increased SOB, chest pain, open sores or wounds. Denies hx of diabetes. Last plavix dose 2025.     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    Chart reviewed prior to treatment and patient is agreeable for therapy.  All lines intact and patient positioned comfortably at end of treatment.  All patient needs addressed prior to ending therapy session.     Discharge Recommendations:  Patient would benefit from continued therapy after discharge   Pt presenting with new musculoskeletal dysfunction and would benefit from additional therapy at time of discharge.  Please refer to the AM-PAC score for current functional status.           Patient Diagnosis(es): The primary encounter diagnosis was Status post total left knee replacement. Diagnoses of Mechanical loosening of internal left knee prosthetic joint and Status post total knee replacement, left were also pertinent to this

## 2025-04-01 NOTE — DISCHARGE INSTR - COC
Continuity of Care Form    Patient Name: Mavis Ann   :  1955  MRN:  [id number]    Admit date:  2025  Discharge date:  2025    Code Status Order: Prior   Advance Directives:    Date/Time Healthcare Directive Type of Healthcare Directive Copy in Chart Healthcare Agent Appointed Healthcare Agent's Name Healthcare Agent's Phone Number    [phone] Yes, patient has an advance directive for healthcare treatment  Living will  No, copy requested from family  Spouse  Ed Seth,   [phone]             Admitting Physician:  Owen Blake DO  PCP: Lianet Agarwal, APRN - CNP    Discharging Nurse: Norma  Discharging Hospital Unit/Room#: [facility] OR Pool/NONE  Discharging Unit Phone Number: [phone]    Emergency Contact:   Extended Emergency Contact Information  Primary Emergency Contact: Ritesh Ann  Address: [address] [place] [address]  Home Phone: [phone]  Work Phone: [phone]  Mobile Phone: [phone]  Relation: Spouse   needed? No  Secondary Emergency Contact: Lakeshia Ambrocio  Address: [address]  Home Phone: [phone]  Work Phone: [phone]  Mobile Phone: [phone]  Relation: Child   needed? No    Past Surgical History:  Past Surgical History:   Procedure Laterality Date    BLADDER SUSPENSION      BREAST BIOPSY      CARDIAC CATHETERIZATION  2023    x2 stents     SECTION  07/15/1979    along with a hyst, removal of 1 ovary    COLONOSCOPY      DILATION AND CURETTAGE OF UTERUS      x 2    EYE SURGERY      Cataracts    HYSTERECTOMY (CERVIX STATUS UNKNOWN)  07/15/1979    uterine rupture due to placenta accreta    HYSTERECTOMY, TOTAL ABDOMINAL (CERVIX REMOVED)      JOINT REPLACEMENT      see knee arthroplasty    LIVER BIOPSY      OTHER SURGICAL HISTORY Left 2014    tarsal tunnel release, micro debridement tendon, gps, onestep

## 2025-04-01 NOTE — H&P
Interval H&P Note    Pt Name: Mavis Ann  MRN: [id number]  YOB: 1955  Date of evaluation: 4/1/2025      [x] I have reviewed in EPIC the progress note by OLAYINKA David dated 03/20/2025 for an Interval History and Physical note.     [x] I have examined  Mavis Ann, a 70 y.o. female.There are no changes to the patient who is scheduled for LEFT REVISION TOTAL KNEE ARTHROPLASTY POSSIBLE SPACER, POSSIBLE HARDWARE REMOVAL by Owen Blake DO for Mechanical loosening of internal left knee prosthetic joint; Status post t*. The patient denies new health changes, fever, chills, wheezing, cough, increased SOB, chest pain, open sores or wounds. Denies hx of diabetes. Last plavix dose 03/27/2025.    Patient received cardiac, medical, pulmonology, and hematology clearance, and all can be found within the media tab of EPIC.    Vital signs: /60   Pulse 92   Temp 97.3 °F (36.3 °C)   Resp 18   Ht 1.524 m (5')   Wt 76.2 kg (168 lb)   SpO2 93%   BMI 32.81 kg/m²     Allergies:  Claritin [loratadine] and Demerol    Medications:    Prior to Admission medications    Medication Sig Start Date End Date Taking? Authorizing Provider   phentermine (ADIPEX-P) 37.5 MG tablet Take 1 tablet by mouth every morning (before breakfast) for 30 days. 3/20/25 4/19/25 Yes Lianet Agarwal APRN - CNP   amoxicillin (AMOXIL) 500 MG capsule Take 4 tablets 1 hour prior to a dental procedure 3/10/25  Yes Owen Blake DO   ezetimibe (ZETIA) 10 MG tablet Take 1 tablet by mouth daily   Yes Provider, MD Dexter   ranolazine (RANEXA) 500 MG extended release tablet Take 1 tablet by mouth 2 times daily 7/15/24  Yes Lianet Agarwal APRN - CNP   atorvastatin (LIPITOR) 40 MG tablet Take 1 tablet by mouth nightly 6/14/24 3/13/26 Yes Lianet Agarwal APRN - CNP   escitalopram (LEXAPRO) 20 MG tablet Take 1 tablet by mouth daily 4/15/24 3/13/26 Yes Lianet Agarwal, APRN - CNP   metoprolol succinate (TOPROL XL) 50 MG

## 2025-04-01 NOTE — PROGRESS NOTES
CLINICAL PHARMACY NOTE: MEDS TO BEDS    Total # of Prescriptions Filled: 4   The following medications were delivered to the patient:  Percocet 5-325  Eliquis 2.54mg  Cyclobenzaprine 10mg  Stool softener 100mg    Additional Documentation:

## 2025-04-01 NOTE — ANESTHESIA POSTPROCEDURE EVALUATION
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    Vanessa Phase I: Vanessa Score: 9    Vanessa Phase II:      Anesthesia Post Evaluation    Patient location during evaluation: PACU  Patient participation: complete - patient participated  Level of consciousness: awake and alert  Airway patency: patent  Nausea & Vomiting: no nausea and no vomiting  Cardiovascular status: hemodynamically stable  Respiratory status: acceptable  Hydration status: stable  Pain management: adequate    No notable events documented.

## 2025-04-01 NOTE — PROGRESS NOTES
Occupational Therapy Initial Evaluation  Facility/Department: [facility] MED SURG [place]   Patient Name: Mavis Ann        MRN: [id number]    : 1955    Date of Service: 2025    CORIN Green reports patient is medically stable for therapy treatment this date.   Chart reviewed prior to treatment and patient is agreeable for therapy.  All lines intact and patient positioned comfortably at end of treatment.  All patient needs addressed prior to ending therapy session.       Discharge Recommendations  Discharge Recommendations: Home with assist PRN  OT Equipment Recommendations  Equipment Needed: No (Pt owns equipment)    No chief complaint on file.      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), Depression, DJD (degenerative joint disease), DVT (deep venous thrombosis) ([facility]), GERD (gastroesophageal reflux disease), Hemorrhage, Hepatitis C, History of blood transfusion, Hx of blood clots, Hyperlipidemia, Hypertension, Osteoarthritis, Ovarian cyst rupture, Placenta accreta, PONV (postoperative nausea and vomiting), Prolapsed bladder, Prolonged emergence from general anesthesia, Uterine rupture, and UTI (lower urinary tract infection).  Past Surgical History:  has a past surgical history that includes Tonsillectomy; Dilation and curettage of uterus; liver biopsy; bladder suspension; Total knee arthroplasty (Bilateral); joint replacement;  section (07/15/1979); Hysterectomy (07/15/1979); Colonoscopy; other surgical history (Left,

## 2025-04-01 NOTE — PROGRESS NOTES
Orthopedic Coordinator Note    Patient s/p left total Knee replacement REVISION on 04/01/2025 WITH DR. CRAIG.    The following appointments are currently scheduled:    Post-op with surgeon 04/15/2025 AT 1330 IN [place] WITH RADHA PARDO.    Physical Therapy [facility] WITH [facility] IS ABLE TO ACCEPT.    PT HAS A FWW.    DVT Prophylaxis: 2.5MG ELIQUIS BID X 14 DASY POSTOP. PT TO START ELIQUIS POD1 ON 04/02/2025.    PT HAD PREVIOUS REVISION 01/24/2022 WITH DR. ELLIS.      Any questions please contact Christa Rockwell RN, MSN  [phone]      Electronically signed by: CHRISTA ROCKWELL RN on 4/1/2025 at 8:34 AM

## 2025-04-01 NOTE — PROGRESS NOTES
Pt discharged to home in stable condition with belongings  Discharge instructions given  \"Meds To Beds\" medication at bedside  Pt denies having any further questions at this time  Locked up home medication(s)/personal items given to patient at discharge  Patient/family state they have everything they were admitted with.  Dressing change supplies and hibiclens sent home with patient

## 2025-04-01 NOTE — PLAN OF CARE
Problem: Discharge Planning  Goal: Discharge to home or other facility with appropriate resources  Outcome: Adequate for Discharge  Flowsheets (Taken 4/1/2025 3729)  Discharge to home or other facility with appropriate resources:   Identify barriers to discharge with patient and caregiver   Arrange for needed discharge resources and transportation as appropriate   Identify discharge learning needs (meds, wound care, etc)   Refer to discharge planning if patient needs post-hospital services based on physician order or complex needs related to functional status, cognitive ability or social support system     Problem: Pain  Goal: Verbalizes/displays adequate comfort level or baseline comfort level  Outcome: Adequate for Discharge     Problem: Safety - Adult  Goal: Free from fall injury  Outcome: Adequate for Discharge     Problem: Skin/Tissue Integrity  Goal: Skin integrity remains intact  Description: 1.  Monitor for areas of redness and/or skin breakdown  2.  Assess vascular access sites hourly  3.  Every 4-6 hours minimum:  Change oxygen saturation probe site  4.  Every 4-6 hours:  If on nasal continuous positive airway pressure, respiratory therapy assess nares and determine need for appliance change or resting period  Outcome: Adequate for Discharge  Flowsheets (Taken 4/1/2025 2941)  Skin Integrity Remains Intact: Monitor for areas of redness and/or skin breakdown     Problem: ABCDS Injury Assessment  Goal: Absence of physical injury  Outcome: Adequate for Discharge     
 joints.  (If allergic to aspirin, give eliquis 2.5mg BID X 14 days (knees) or 35 days  (hips) starting first day postop at 0600).      [x]  DVT Prophylaxis:  Class BX ([place] choice)    [x]Eliquis 2.5mg BID x 14 days (knees) or 35 days (hips) starting first day postop      at 0600      [] Lovenox 40 mg subcu daily starting first day postop at 0600 x 14 days                             (knees) or 35 days (hips)    Ecotrin 81 mg PO BID-start when Lovenox is finished.    (Teach injection prior to discharge.)       [] Xarelto 10 mg by mouth daily starting first day postop at 0600     14 days (knees) or 35 days (hips).     If creatinine clearance less than 30 mL/min, give Lovenox 30 mg subcu   Daily starting first day postop at 0600.  Ecotrin 81 mg PO BID-starting   when Lovenox is finished.  (Teach injection prior to      discharge.)  (For discharge fill throughout the 10 mg daily with no    refills.)      []  DVT Prophylaxis:  Class BY ([place] choice)               []  Eliquis 2.5mg BID x 14 days (knees) or 35 days (hips) starting first day                              postop at 0600        []  Ecotrin 81 mg PO BID x 6weeks (all joints)      []  Lovenox 40mg SQ daily to start at 0600 first day post-Op day.    14 days for knees, 35 days for hips    Ecotrin 81 mg PO BID - start when Lovenox is finished.    (Teach injection prior to discharge.)       [] Xarelto 10 mg PO daily starting first day Post-Op at 0600    14 days (knees) or 35 days (hips)    If Creatinine Clearance less than 30ml/min, give Lovenox 30 mg    SQ daily starting first day Post-Op at 0600 x 14 days (knees) or 35   days (hips).  Ecotrin 81 mg PO BID - start when Lovenox is finished.    (Teach injection prior to discharge.)  (For discharge fill throughout the   10 mg daily #32 with no refills.)      Electronically signed by Shellie Montero DO on 3/27/2025 at 8:52 AM

## 2025-04-01 NOTE — OP NOTE
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]     * Status post total knee replacement, left [Z96.652]    Post-Op Diagnosis: Mechanical loosening of internal left knee prosthetic joint, aseptic       Procedure(s):  LEFT REVISION TOTAL KNEE ARTHROPLASTY    Surgeon(s):  Owen Blake DO    Assistant:   First Assistant: Magda Dunaway  Resident: Ibrahima Clark DO    Anesthesia: General    Estimated Blood Loss (mL): 100    Complications: None    Specimens:   ID Type Source Tests Collected by Time Destination   1 : LEFT KNEE #1 Tissue Joint, Knee CULTURE, TISSUE (WITH GRAM STAIN) Owen Blake DO 4/1/2025 0933    2 : LEFT KNEE #2 Tissue Joint, Knee CULTURE, TISSUE (WITH GRAM STAIN) Owen Blake DO 4/1/2025 0933        Implants:  Implant Name Type Inv. Item Serial No.  Lot No. LRB No. Used Action   CEMENT BNE 40GM W/ GENT HI VISC RADPQ FOR REV SURG - [id number]  CEMENT BNE 40GM W/ GENT HI VISC RADPQ FOR REV SURG  [employer]- [id number] Left 2 Implanted   COMPONENT FEM AUG 7 5 MM DSTL PERSONA - [id number]  COMPONENT FEM AUG 7 5 MM DSTL PERSONA  [employer]- [id number] Left 1 Implanted   PSN REV TIB FIXED KEEL CMT SZ C L - [id number]  PSN REV TIB FIXED KEEL CMT SZ C L  [employer]- [id number] Left 1 Implanted   PSN REV TM TIB CENTRAL CONE FIX TIB - [id number]  PSN REV TM TIB CENTRAL CONE FIX TIB  [employer]- [id number] Left 1 Implanted   PSN REV 3MM OFFSET STEM EXT [id number] - [id number]  PSN REV 3MM OFFSET STEM EXT [id number]  [employer]- [id number] Left 1 Implanted   COMPONENT TIB AUG REV CD 10 MM KNEE TI PERSONA - [id number]  COMPONENT TIB AUG REV CD 10 MM KNEE TI PERSONA  [employer]- [id number] Left 1 Implanted

## 2025-04-01 NOTE — DISCHARGE INSTRUCTIONS
ANY ORTHOPEDIC QUESTIONS OR ANY OTHER CONCERNS YOU MAY CALL THE ORTHOPEDIC COORDINATOR:  LOLA TOMAS RN, MSN  [phone]  [email or web address]    [employer] DRESSING REMAINS IN PLACE UN TIL 2 WEEK FOLLOW APPT WITH RADHA PARDO.    DISCHARGE INSTRUCTIONS  Caring for yourself after joint replacement surgery (Total Hip and Total Knee Replacement)    Activity and Therapy  Receive physical therapy three times per week. (Pain medication one hour prior to therapy)   Perform PT exercises on own when not receiving home or outpatient PT.  Ideally exercises should be at least two times a day.  Increase level of activity and ambulation each day.  Perform deep breathing exercises daily.  Patient provides self-care when possible.  Work on Range of motion for Total knee patients.   No pillow under the knee for Total knee patients.  Elevate the surgical leg when seated.  No driving until cleared by surgeon      Diet:  Increase oral intake of fruits, fiber and water to prevent constipation.  Drink fluids frequently and take stool softeners to aid in bowel motility.  Increase protein intake/reduce high-sugar intake to help promote healing and prevent infection.    Incision Care:  Keep Primaseal or other dressing intact and remove as directed by surgeon, unless saturated, in which case, call surgeon and request instructions.  If dressing falls off, call surgeon.  Jean-Claude Hose on in the am and off in the pm to reduce swelling.  Ice affected area four times a day, for twenty minutes.    Pain Medications and Anticoagulant  You have been place on an anticoagulant to prevent blood clots.  Take this medication exactly as prescribed.  Be alert for signs of bleeding.  Take care not to injure yourself.  You have been provided pain medicine to control your pain.  Do not take more narcotics than prescribed.  You may begin weaning from narcotics as your pain level improves by decreasing the amount or frequency of the narcotics.  You may also take

## 2025-04-02 ENCOUNTER — CARE COORDINATION (OUTPATIENT)
Dept: CARE COORDINATION | Age: 70
End: 2025-04-02

## 2025-04-02 ENCOUNTER — TELEPHONE (OUTPATIENT)
Dept: ORTHOPEDIC SURGERY | Age: 70
End: 2025-04-02

## 2025-04-02 DIAGNOSIS — Z96.652 S/P REVISION OF TOTAL KNEE, LEFT: Primary | ICD-10-CM

## 2025-04-02 PROCEDURE — 1111F DSCHRG MED/CURRENT MED MERGE: CPT | Performed by: NURSE PRACTITIONER

## 2025-04-02 NOTE — CARE COORDINATION
Care Transitions Note    Initial Call - Call within 2 business days of discharge: Yes    Patient Current Location:  Home: [address] [facility] [address]    Care Transition Nurse contacted the patient by telephone to perform post hospital discharge assessment, verified name and  as identifiers.  Provided introduction to self, and explanation of the Care Transition Nurse role.    Patient: Mavis Ann    Patient : 1955   MRN: [id number]    Reason for Admission: Status post total left knee replacement   Discharge Date: 25  RURS: Readmission Risk Score: 9.4      Last Discharge Facility       Date Complaint Diagnosis Description Type Department Provider    25  Status post total left knee replacement ... Admission (Discharged) Owen Begum MS, DO            Was this an external facility discharge? No    Additional needs identified to be addressed with provider   No needs identified             Method of communication with provider: none.    Patients top risk factors for readmission: functional physical ability, lack of knowledge about disease, and medical condition-Lt knee    Interventions to address risk factors:   Review of patient management of conditions/medications: discharge  Home Health: [facility] when SOC     Care Summary Note: Was able to contact Mavis for transitional outreach.  She went to the hospital for a scheduled knee revision.  She said that she was doing \"fantastic\".  She said that her pain was manageable, does have some swelling, no drainage on the dressing and no s/s of compromised circulation to Lt lower leg.  She said that she is using ice to the area. She said that she is urinating but has not had a BM yet.  She said that she is passing flatus and has medicine if constipation becomes a problem.  She stated that she had all her medications and is holding her plavix until she is done taking the Eliquis.  She has post op visit scheduled.   Her PCP told her

## 2025-04-02 NOTE — TELEPHONE ENCOUNTER
L Revision TKA dos 4/1/25. Ty, a Home Care Physical Therapist at [facility] called to report that he saw the patient today for her PT evaluation and said she is doing well after her surgery yesterday. He is looking for verbal approval to continue physical therapy 2 times/week for the first week and 3 times/week thereafter, please. His call back number is [phone]. Thank you.

## 2025-04-03 ENCOUNTER — TELEPHONE (OUTPATIENT)
Dept: ORTHOPEDIC SURGERY | Age: 70
End: 2025-04-03

## 2025-04-03 LAB
BLOOD BANK DISPENSE STATUS: NORMAL
BPU ID: NORMAL
COMPONENT: NORMAL
TRANSFUSION STATUS: NORMAL
UNIT DIVISION: 0

## 2025-04-03 NOTE — TELEPHONE ENCOUNTER
[facility] is requesting verbal approval to continue PT  twice a week for one week and then   3x for 2 wks.     left knee - patient is doing well     Please call   Ty [phone] to give verbal approval

## 2025-04-03 NOTE — TELEPHONE ENCOUNTER
L Revision TKA dos 4/1/25  [facility] for first week then 3x after.

## 2025-04-03 NOTE — TELEPHONE ENCOUNTER
Ty called and I gave him the verbal order via Nanci arauz PA-C.     She stated that he is fine to see the patient one more time this week and then next week they will go in the full 3 times a week for 2 weeks.

## 2025-04-06 LAB
MICROORGANISM SPEC CULT: NORMAL
MICROORGANISM SPEC CULT: NORMAL
MICROORGANISM/AGENT SPEC: NORMAL
SERVICE CMNT-IMP: NORMAL
SERVICE CMNT-IMP: NORMAL
SPECIMEN DESCRIPTION: NORMAL
SPECIMEN DESCRIPTION: NORMAL

## 2025-04-07 ENCOUNTER — TELEPHONE (OUTPATIENT)
Dept: ORTHOPEDIC SURGERY | Age: 70
End: 2025-04-07

## 2025-04-07 NOTE — TELEPHONE ENCOUNTER
Dale from [facility] home care called in regarding f/u appointment date. Informed her f/u is 4/15/25

## 2025-04-09 ENCOUNTER — CARE COORDINATION (OUTPATIENT)
Dept: CARE COORDINATION | Age: 70
End: 2025-04-09

## 2025-04-09 NOTE — CARE COORDINATION
Care Transitions Note    Follow Up Call     Patient Current Location:  Home: [address]  [place] [facility] [address]    Care Transition Nurse contacted the patient by telephone. Verified name and  as identifiers.    Additional needs identified to be addressed with provider   No needs identified                 Method of communication with provider: none.    Care Summary Note: Was able to contact Mavis for initial transitional outreach.   She said that she was doing \"pretty good\".  She denied any s/s of compromised circulation, dressing is intact and she is having bowel movements after starting Senokot.  She did say that her Lt leg is really swollen and she is unable to wear shoes.  She said that this happened during her last knee surgery and it will eventually resolve.  She is only wearing the compression stocking for 5-6 hr due to pain from the stocking.  She is working with home therapy.  She does have follow up with ortho next week.  She has no further questions/concerns.     Plan of care updates since last contact:  Review of patient management of conditions/medications:         Advance Care Planning:   Does patient have an Advance Directive: reviewed during previous call, see note. .    Medication Review:  No changes since last call.     Remote Patient Monitoring:  Offered patient enrollment in the Remote Patient Monitoring (RPM) program for in-home monitoring: Patient is not eligible for RPM program because: patient does not have qualifying diagnosis.    Assessments:  Care Transitions Subsequent and Final Call    Subsequent and Final Calls  Do you have any ongoing symptoms?: No  Have your medications changed?: No  Do you have any questions related to your medications?: No  Do you currently have any active services?: Yes  Are you currently active with any services?: Home Health  Do you have any needs or concerns that I can assist you with?: No  Care Transitions Interventions  Other Interventions:

## 2025-04-11 DIAGNOSIS — Z96.652 STATUS POST TOTAL KNEE REPLACEMENT, LEFT: Primary | ICD-10-CM

## 2025-04-15 ENCOUNTER — OFFICE VISIT (OUTPATIENT)
Dept: ORTHOPEDIC SURGERY | Age: 70
End: 2025-04-15

## 2025-04-15 VITALS — BODY MASS INDEX: 33.57 KG/M2 | WEIGHT: 171 LBS | RESPIRATION RATE: 17 BRPM | HEIGHT: 60 IN | OXYGEN SATURATION: 98 %

## 2025-04-15 DIAGNOSIS — Z96.652 STATUS POST TOTAL LEFT KNEE REPLACEMENT: Primary | ICD-10-CM

## 2025-04-15 PROCEDURE — 99024 POSTOP FOLLOW-UP VISIT: CPT | Performed by: PHYSICIAN ASSISTANT

## 2025-04-15 RX ORDER — OXYCODONE AND ACETAMINOPHEN 5; 325 MG/1; MG/1
1 TABLET ORAL EVERY 6 HOURS PRN
Qty: 28 TABLET | Refills: 0 | Status: SHIPPED | OUTPATIENT
Start: 2025-04-15 | End: 2025-04-22

## 2025-04-15 ASSESSMENT — ENCOUNTER SYMPTOMS
RESPIRATORY NEGATIVE: 1
SHORTNESS OF BREATH: 0
ABDOMINAL PAIN: 0
APNEA: 0
ABDOMINAL DISTENTION: 0
CONSTIPATION: 0
CHEST TIGHTNESS: 0
COLOR CHANGE: 0
GASTROINTESTINAL NEGATIVE: 1
NAUSEA: 0
DIARRHEA: 0
VOMITING: 0
COUGH: 0

## 2025-04-15 NOTE — PATIENT INSTRUCTIONS
PATIENTIQ:  PatientIQ helps [facility] stay in touch with you to know how you're feeling, and provides education and care instructions to you at various time points.   Your answers help your care team track your progress to provide the best care possible. PatientIQ will contact you pre-op and post-op via email or text with:  Educational Videos and Care Instructions  Questionnaires About How You're Feeling    Your participation provides you valuable education and helps [facility] continue to provide quality care to all patients. Thank you

## 2025-04-15 NOTE — PROGRESS NOTES
[facility] PHYSICIANS [facility] ORTHOPEDICS AND SPORTS MEDICINE  [address]  [place] [facility] [place] [address]  Dept: [phone]  Dept Fax: [phone]        Post Operative Follow Up    Subjective:     Chief Complaint   Patient presents with    Knee Pain     Left knee pain- TKA revision DOS 4/1/2025     Post Op Surgery:     History of Present Illness  Mavis is a 70-year-old female who is here for a postop appointment for a left revision total knee arthroplasty.  Date of surgery was 4/1/2025 by Dr. Owen Blake DO.  Therefore she is 2 weeks postop.      The patient presents for evaluation status post knee surgery.    The chief complaint is pain management following knee surgery. She reports a satisfactory recovery, with pain management being more effective than in her previous two surgeries. Pain is managed with Percocet, taking one tablet an hour prior to physical therapy sessions, and occasionally up to two tablets daily. Home-based physical therapy has been ongoing for the past two weeks, with anticipated discharge on 04/18/2025. Compression socks were not utilized today due to the scheduled examination. Flexeril has not been used as muscle spasms are not experienced. No nausea, vomiting, shortness of breath, or chest pain is reported.     No calf pain is noted, and Eliquis has been taken for two weeks. Plavix is planned to be resumed tomorrow. Approximately 10 tablets of current pain medication remain. Blisters from stockings were developed during the last surgery, which were not beneficial. Latex allergy is denied, but a known bleeding disorder is present. Blood accumulation under the skin post-surgery caused it to turn purple. No numbness on the lateral side is reported, but pain is experienced. Redness in the area is noted today, which has not been scratched.    SOCIAL HISTORY  Exercise: Walking      Review of Systems   Constitutional:  Positive for

## 2025-04-16 ENCOUNTER — CARE COORDINATION (OUTPATIENT)
Dept: CARE COORDINATION | Age: 70
End: 2025-04-16

## 2025-04-16 NOTE — CARE COORDINATION
Care Transitions Note    Follow Up Call     Attempted to reach patient for transitions of care follow up.  Unable to reach  Was able to reach pt .      Outreach Attempts:   Was able to reach pt, but she was at an appointment and requested a call back this afternoon    Care Summary Note: 1st attempt    Follow Up Appointment:   Future Appointments         Provider Specialty Dept Phone    2025 10:20 AM SCHEDULE, [facility] Orthopedic Surgery [phone]    2025 10:15 AM Lianet Agarwal, APRN - CNP [facility] [phone]            Plan for follow-up on next business day.  based on severity of symptoms and risk factors. Plan for next call:  recovery from Lt knee revision  post op visit    CAITLYN STEINBERG RN    Care Transitions Note    Follow Up Call     Patient Current Location:  Home: [address] [place] [address]    Care Transition Nurse contacted the patient by telephone. Verified name and  as identifiers.    Additional needs identified to be addressed with provider   No needs identified                 Method of communication with provider: none.    Care Summary Note: Was able to contact Mavis for transitional outreach.  She said that she was doing \"good\".  She said that her pain was tolerable, swelling was improving, and incision was healing with no s/s of infection.  She said that home therapy will be discharging her tomorrow and she will be going to out patient therapy next week.  She did go to her post op visit and she was told that everything was fine.  She had no further questions/concerns.  Informed of final outreach.     Plan of care updates since last contact:  Ortho appointment       Advance Care Planning:   Does patient have an Advance Directive: reviewed during previous call, see note. .    Medication Review:  No changes since last call.     Remote Patient Monitoring:  Offered patient enrollment in the Remote Patient Monitoring (RPM) program for in-home monitoring: Patient

## 2025-04-22 ENCOUNTER — HOSPITAL ENCOUNTER (OUTPATIENT)
Dept: PHYSICAL THERAPY | Age: 70
Setting detail: THERAPIES SERIES
Discharge: HOME | End: 2025-04-22
Payer: MEDICARE

## 2025-04-22 PROCEDURE — 97161 PT EVAL LOW COMPLEX 20 MIN: CPT

## 2025-04-22 NOTE — CONSULTS
breaks when she tried   Lifting/Carrying [x] Independent  [] Deficit [] Independent  [x] Deficit Avoid lifting right now but then has tried laundry baskets with noting it has been okay   Bending/Reaching [x] Independent  [] Deficit [] Independent  [x] Deficit Some difficult    Stair climbing [x] Independent  [] Deficit [] Independent  [x] Deficit Slow      Patient Goals/Rehab Expectations: \"be able to go back outside and plant flowers\"      Objective:      Observations:   OBSERVATION No Deficit Deficit Comments   Posture          Forward head  x    Rounded shoulders  x    Slumped sitting   x    kyphosis x     Lordosis x     STANDING ALIGNMENT       Sensation   x Baseline at L foot   Palpation  x Pain noted at L LCL, adductor tubercle, distal quad, L tibial shaft, peroneal muscles   Joint Mobility   x Empty end feel from pain   Gait   x 2WW, wide AZEB, decrease L knee flexion, decrease stance LLE   Fall risk    x Tinetti Score       Lumbar Clearing:  [x] Not tested            [] No Deficit              [] Deficit:             Range of Motion  Left Range of Motion  Right Strength  Left Strength  Right   Hip Flexion   4 4   Hip Abduction   4+ 5   Hip Adduction   4+ 5   Hip Extension   4- 4   Knee Flexion 89 AROM  95 PROM 125 AROM 4 4+   Knee Extension -3 AROM 0 AROM 4+  pain 5   Dorsiflexion   4+ 5   Plantar flexion   4+ 5   Inversion   4  pain 5   Eversion   4  pain 5       MUSCLE LENGTH TESTING Normal Left Tight Right Tight   Glutes []  []  []    Piriformis []  []  []    ITB/TFL []  []  []    Hip Flexors []  []  []    Quads []  []  []    Hamstrings []  [x]  []    Gastrocnemius []  [x]  []    Soleus []  []  []     []  []  []     []  []  []          Functional Testing:      Evaluation from 4/22/2025 in [facility] PT    4/22/2025    1016   Tinetti     Sitting Balance Steady, safe   Arises Able, uses arms to help   Attempts to Arise Able to arise, one attempt   Immediate Standing Balance (First 5 Seconds) Steady

## 2025-04-28 ENCOUNTER — HOSPITAL ENCOUNTER (OUTPATIENT)
Dept: PHYSICAL THERAPY | Age: 70
Setting detail: THERAPIES SERIES
Discharge: HOME | End: 2025-04-28
Payer: MEDICARE

## 2025-04-28 PROCEDURE — 97110 THERAPEUTIC EXERCISES: CPT

## 2025-04-28 NOTE — FLOWSHEET NOTE
[facility] Outpatient Physical Therapy   [address]   Phone: [phone]   Fax: [phone]    Physical Therapy Daily Treatment Note      Date:  2025  Patient Name:  Mavis Ann    :  1955  MRN: [id number]  Physician: Nanci Miller PA-C                                  Insurance: [facility] Medicare with visits BMN, AUTH AFTER KAILYN  VAILD  -  FOR 12 VISITS  Medical Diagnosis: Z96.652 (ICD-10-CM) - Status post total left knee replacement           Rehab Codes: M62.81 , M25.562 , M79.662 , M25.662 , R26.89   Onset Date: 2025 DOS                Next 's appt: 2025  Visit# / total visits:   ( auth)  Cancels/No Shows: 0/0    Precautions:  L TKA, fall risk, DVT hx LLE ()     Per Referral: Evaluate and treat. 2-3 times a week for 4 to 6 weeks. Range of motion and strengthening. TKA protoco     Subjective:  Patient comes in without an AD. First day driving and notes that the hardest part was getting out of the car since it is an SUV.  Reports that she does seem to be doing better. More of the pain is still at her L shin. Notes stiffness at the L knee, especially in the morning. Has been doing her HEP with no issues to report.      Pain:  [x] Yes  [] No Location: L Hansen Pain Rating: (0-10 scale) 4-5/10  Pain altered Tx:  [x] No  [] Yes  Action:  Comments:    Objective:  INTERVENTIONS  INTERVENTIONS  Reps/ Time Weight/ Level Completed  Today Comments          MODALITIES        Vasocompression L Knee               MANUAL                      EXERCISES        NuStep 5 min Level 2 x           Standing:       Mini Squats 10 x 2  x    Hip 3 Way       Knee Flexion / Extension Step Stretch LLE 10 x 5\" 10 in x    Step Ups FWD / Lateral LLE 10 x 2 eac 6 in x    Slant Board Stretch 3 x 30\"  x           Seated:       LAQ                     Mat Level:       Heel Slides w/ Strap L Knee 10 x 5\"  x    Quad Set w/ Overpressure L Knee       Bridges

## 2025-05-01 ENCOUNTER — HOSPITAL ENCOUNTER (OUTPATIENT)
Dept: PHYSICAL THERAPY | Age: 70
Setting detail: THERAPIES SERIES
Discharge: HOME | End: 2025-05-01
Payer: MEDICARE

## 2025-05-01 PROCEDURE — 97110 THERAPEUTIC EXERCISES: CPT

## 2025-05-01 NOTE — FLOWSHEET NOTE
[facility] Outpatient Physical Therapy   [address]   Phone: [phone]   Fax: [phone]    Physical Therapy Daily Treatment Note      Date:  2025  Patient Name:  Mavis Ann    :  1955  MRN: [id number]  Physician: Nanci Miller PA-C                                  Insurance: [facility] Medicare with visits BMN, AUTH AFTER KAILYN DIASILD  -  FOR 12 VISITS  Medical Diagnosis: Z96.652 (ICD-10-CM) - Status post total left knee replacement           Rehab Codes: M62.81 , M25.562 , M79.662 , M25.662 , R26.89   Onset Date: 2025 DOS                Next 's appt: 2025  Visit# / total visits: 3/12  ( auth)  Cancels/No Shows: 0/0    Precautions:  L TKA, fall risk, DVT hx LLE ()     Per Referral: Evaluate and treat. 2-3 times a week for 4 to 6 weeks. Range of motion and strengthening. TKA protoco     Subjective:  Patient reporting to therapy with no new concerns or complaints.  Patient continues to note stiffness in the knee.       Pain:  [x] Yes  [] No Location: L Hansen Pain Rating: (0-10 scale) 4-5/10  Pain altered Tx:  [x] No  [] Yes  Action:  Comments:    Objective:  INTERVENTIONS  INTERVENTIONS  Reps/ Time Weight/ Level Completed  Today Comments          MODALITIES        Vasocompression L Knee               MANUAL        Desensitization and scar tissue massage 8 mins  x           EXERCISES        NuStep 5 min Level 2 x           Standing:       Mini Squats 10 x 2  x 5/1 ed on proper technique   Hip 3 Way       Knee Flexion / Extension Step Stretch LLE 10 x 5\" 10 in x    Step Ups FWD / Lateral LLE 10 x 2 eac 6 in     Slant Board Stretch 3 x 30\"  x           Seated:       LAQ                     Mat Level:       Heel Slides w/ Strap L Knee 10 x 5\"      Quad Set w/ Overpressure L Knee       Bridges       Hamstring Stretch 2 X 30\"  x standing   Other:      L Knee 4/28    Flexion 102 AAROM    Extension         Patient Education/Home Program :

## 2025-05-05 ENCOUNTER — HOSPITAL ENCOUNTER (OUTPATIENT)
Dept: PHYSICAL THERAPY | Age: 70
Setting detail: THERAPIES SERIES
Discharge: HOME | End: 2025-05-05
Payer: MEDICARE

## 2025-05-05 PROCEDURE — 97110 THERAPEUTIC EXERCISES: CPT

## 2025-05-05 NOTE — FLOWSHEET NOTE
[facility] Outpatient Physical Therapy   [address]   Phone: [phone]   Fax: [phone]    Physical Therapy Daily Treatment Note      Date:  2025  Patient Name:  Mavis Ann    :  1955  MRN: [id number]  Physician: Nanci Miller PA-C                                  Insurance: [facility] Medicare with visits BMN, AUTH AFTER EVAL  VAILD  -  FOR 12 VISITS  Medical Diagnosis: Z96.652 (ICD-10-CM) - Status post total left knee replacement           Rehab Codes: M62.81 , M25.562 , M79.662 , M25.662 , R26.89   Onset Date: 2025 DOS                Next 's appt: 2025  Visit# / total visits:   (3/12 auth)  Cancels/No Shows: 0/0    Precautions:  L TKA, fall risk, DVT hx LLE ()     Per Referral: Evaluate and treat. 2-3 times a week for 4 to 6 weeks. Range of motion and strengthening. TKA protoco     Subjective:  Pt reports 1-2/10 at start of session, states \"finlly learning to not over do it\". Pt notes compliance with HEP.       Pain:  [x] Yes  [] No Location: L Hansen Pain Rating: (0-10 scale)1-2/10  Pain altered Tx:  [x] No  [] Yes  Action:  Comments:    Objective:  INTERVENTIONS  INTERVENTIONS  Reps/ Time Weight/ Level Completed  Today Comments          MODALITIES        Vasocompression L Knee               MANUAL        Desensitization and scar tissue massage 8 mins             EXERCISES        NuStep 5 min Level 2 x           Standing:       Mini Squats 10 x 2  x 5/1 ed on proper technique   Hip 3 Way       Knee Flexion / Extension Step Stretch LLE 10 x 5\" 10 in x    Step Ups FWD / Lateral LLE 10 x 2 eac 6 in x    Slant Board Stretch 3 x 30\"  x           Seated:       LAQ                     Mat Level:       Heel Slides w/ Strap L Knee 10 x 5\"      Quad Set w/ Overpressure L Knee       Bridges       Hamstring Stretch 2 X 30\"  x standing   Other:      L Knee 4/28 5/5   Flexion 102 AAROM 105 AAROM    Extension         Patient Education/Home

## 2025-05-08 ENCOUNTER — HOSPITAL ENCOUNTER (OUTPATIENT)
Dept: PHYSICAL THERAPY | Age: 70
Setting detail: THERAPIES SERIES
Discharge: HOME | End: 2025-05-08
Payer: MEDICARE

## 2025-05-08 PROCEDURE — 97110 THERAPEUTIC EXERCISES: CPT

## 2025-05-08 NOTE — FLOWSHEET NOTE
[facility] Outpatient Physical Therapy   [address]   Phone: [phone]   Fax: [phone]    Physical Therapy Daily Treatment Note      Date:  2025  Patient Name:  Mavis Ann    :  1955  MRN: [id number]  Physician: Nanci Miller PA-C                                  Insurance: [facility] Medicare with visits BMN, AUTH AFTER EVAL  VAILD  -  FOR 12 VISITS  Medical Diagnosis: Z96.652 (ICD-10-CM) - Status post total left knee replacement           Rehab Codes: M62.81 , M25.562 , M79.662 , M25.662 , R26.89   Onset Date: 2025 DOS                Next Dr.'s appt: 2025  Visit# / total visits:   ( auth)  Cancels/No Shows: 0/0    Precautions:  L TKA, fall risk, DVT hx LLE ()     Per Referral: Evaluate and treat. 2-3 times a week for 4 to 6 weeks. Range of motion and strengthening. TKA protoco     Subjective:  Pt reports 4-5/10 pain at start of session. Notes being able to complete yard work yesterday with slight increase in pain/discomfort, however pt reports taking breaks when needed. Pt notes increased fatigue this session. Uses foot stepper at home and notes compliance with HEP.       Pain:  [x] Yes  [] No Location: L Hansen Pain Rating: (0-10 scale)4-5/10  Pain altered Tx:  [x] No  [] Yes  Action:  Comments:    Objective:  INTERVENTIONS  INTERVENTIONS  Reps/ Time Weight/ Level Completed  Today Comments          MODALITIES        Vasocompression L Knee               MANUAL        Desensitization and scar tissue massage 8 mins             EXERCISES        NuStep 5 min Level 2 x           Standing:       Mini Squats 10 x 2  x 5/1 ed on proper technique   Hip 3 Way 10x yellow x Added 5/8   Knee Flexion / Extension Step Stretch LLE 10 x 5\" 10 in x    Step Ups FWD / Lateral LLE 10 x 2 eac 6 in x    Slant Board Stretch 3 x 30\"  x    Marches 20x eac  x Added 5/8   Hamstring curls 10x  x Added 5/8   TKE  10x 5\" red x Added 5/8   Seated:       LAQ

## 2025-05-12 ENCOUNTER — HOSPITAL ENCOUNTER (OUTPATIENT)
Dept: PHYSICAL THERAPY | Age: 70
Setting detail: THERAPIES SERIES
Discharge: HOME | End: 2025-05-12
Payer: MEDICARE

## 2025-05-12 PROCEDURE — 97110 THERAPEUTIC EXERCISES: CPT

## 2025-05-12 NOTE — PROGRESS NOTES
[facility] Outpatient Physical Therapy   [address]   Phone: [phone]   Fax: [phone]    Physical Therapy Daily Treatment Note / Progress Note      Date:  2025  Patient Name:  Mavis Ann    :  1955  MRN: [id number]  Physician: Nanci Miller PA-C                                  Insurance: [facility] Medicare with visits BMN, AUTH AFTER KAILYN DIASILD  -  FOR 12 VISITS  Medical Diagnosis: Z96.652 (ICD-10-CM) - Status post total left knee replacement           Rehab Codes: M62.81 , M25.562 , M79.662 , M25.662 , R26.89   Onset Date: 2025 DOS                Next Dr.'s appt: 2025  Visit# / total visits:   ( auth)  Cancels/No Shows: 0/0    Formal reporting period: 2025 - 2025    Precautions:  L TKA, fall risk, DVT hx LLE ()     Per Referral: Evaluate and treat. 2-3 times a week for 4 to 6 weeks. Range of motion and strengthening. TKA protoco     Subjective:  Pt reports that yesterday her pain got up to a 6/10. Noted that she did stand for an hour and a half the day prior as well as not wearing the compression socks that could have lead to the pain increase. Notes that she also did stop icing but has resumed. Noted that she did put her compression socks back on and iced with noting this seemed to decrease the pain. Does report that the desensitization is seeming to help with her shin pain.      Pain:  [x] Yes  [] No Location: L Hansen Pain Rating: (0-10 scale) 2-3/10  Pain altered Tx:  [x] No  [] Yes  Action:  Comments:    Objective:  INTERVENTIONS  INTERVENTIONS  Reps/ Time Weight/ Level Completed  Today Comments          MODALITIES        Vasocompression L Knee               MANUAL        Desensitization and scar tissue massage 8 mins      STM L Hansen 5 min  x Added /12          EXERCISES        NuStep 5 min Level 2 x           Standing:       Mini Squats 10 x 2  x 5/1 ed on proper technique   Total Gym Squats 10 x 5\"  x Added

## 2025-05-14 ENCOUNTER — OFFICE VISIT (OUTPATIENT)
Dept: ORTHOPEDIC SURGERY | Age: 70
End: 2025-05-14

## 2025-05-14 VITALS — HEIGHT: 61 IN | BODY MASS INDEX: 32.71 KG/M2

## 2025-05-14 DIAGNOSIS — Z96.652 STATUS POST TOTAL KNEE REPLACEMENT, LEFT: Primary | ICD-10-CM

## 2025-05-14 PROCEDURE — 99024 POSTOP FOLLOW-UP VISIT: CPT

## 2025-05-14 NOTE — PROGRESS NOTES
[facility] PHYSICIANS [facility] ORTHO SPECIALISTS  [address]  [place] [address]  Dept: [phone]  Dept Fax: [phone]        Orthopaedic Clinic Follow Up      Subjective:   Date of Surgery: Left total knee revision, 4/1/25    Mavis Ann is a 70 y.o. female who presents to the clinic today for routine 6-week follow up Left total knee revision.  Patient denies any new falls or injuries.  Patient denies any numbness, tingling, weakness.  Patient has been working with physical therapy and is able to ambulate without assistance.  She does occasionally take her pain medications after therapy.  She is still using the compression stockings and icing regularly.  She presents with her  today and has no new orthopedic plaints this time.    Review of Systems  Gen: no fever, chills, malaise  CV: no chest pain or palpitations  Resp: no cough or shortness of breath  GI: no nausea, vomiting, diarrhea, or constipation  Neuro: no seizures, vertigo, or headache  Msk: Left knee soreness  10 remaining systems reviewed and negative    Objective :   There were no vitals filed for this visit.Body mass index is 32.71 kg/m².  General: No acute distress, resting comfortably in the clinic  Neuro: alert. oriented  Eyes: Extra-ocular muscles intact  Pulm: Respirations unlabored and regular.  Skin: warm, well perfused  Psych:   Patient has good fund of knowledge and displays understanding of exam, diagnosis, and plan.  MSK:    LLE: Skin intact with well-healed surgical incision.  Minimally tender palpation about the incision and denies dysesthesias lateral to the incision.  Knee arc of motion near terminal extension with 110 degrees of flexion.  Knee is stable to varus and valgus stress at 0 and 30 degrees with negative anterior and posterior drawer. Compartments soft. 2+ DP pulse. TA/EHL/FHL/GS motor intact. Deep and Superficial Peroneal/Saphenous/Sural/Plantar

## 2025-05-15 ENCOUNTER — HOSPITAL ENCOUNTER (OUTPATIENT)
Dept: PHYSICAL THERAPY | Age: 70
Setting detail: THERAPIES SERIES
Discharge: HOME | End: 2025-05-15
Payer: MEDICARE

## 2025-05-15 ENCOUNTER — APPOINTMENT (OUTPATIENT)
Dept: PHYSICAL THERAPY | Age: 70
End: 2025-05-15
Payer: MEDICARE

## 2025-05-15 PROCEDURE — 97110 THERAPEUTIC EXERCISES: CPT

## 2025-05-15 NOTE — FLOWSHEET NOTE
AD and showing proper gait mechanics needed for ADLs and community ambulation for PLOF with minimal restriction   Pt will improve AROM in L knee flexion to 105 degrees or greater in order to demonstrate ability to move/reach in all planes unrestricted at PLOF       Plan: [x] Continue per plan of care.   [] Other:      Treatment Charges: Mins Units   []  Modalities     [x]  Ther Exercise 39 3   [x]  Manual Therapy     []  Ther Activities     []  Aquatics     []  Neuromuscular     [] Vasocompression     [] Gait Training     [] Dry needling        [] 1 or 2 muscles        [] 3 or more muscles     []  Other     Total Treatment time 39 3     Time In:  9:01 am          Time Out: 9:40 am    Electronically signed by:  Crystal Barth PTA

## 2025-05-19 ENCOUNTER — HOSPITAL ENCOUNTER (OUTPATIENT)
Dept: PHYSICAL THERAPY | Age: 70
Setting detail: THERAPIES SERIES
Discharge: HOME | End: 2025-05-19
Payer: MEDICARE

## 2025-05-19 PROCEDURE — 97110 THERAPEUTIC EXERCISES: CPT

## 2025-05-19 NOTE — FLOWSHEET NOTE
[facility]patient Physical Therapy   [address]   Phone: [phone]   Fax: [phone]    Physical Therapy Daily Treatment Note    Date:  2025  Patient Name:  Mavis Ann    :  1955  MRN: [id number]  Physician: Nanci Miller PA-C                                  Insurance: [facility] Medicare with visits BMN, AUTH AFTER KAILYN DIASILD  -  FOR 12 VISITS  Medical Diagnosis: Z96.652 (ICD-10-CM) - Status post total left knee replacement           Rehab Codes: M62.81 , M25.562 , M79.662 , M25.662 , R26.89   Onset Date: 2025 DOS                Next 's appt: 2025  Visit# / total visits:   ( auth)  Cancels/No Shows: 0/0    Formal reporting period: 2025 - 2025    Precautions:  L TKA, fall risk, DVT hx LLE ()     Per Referral: Evaluate and treat. 2-3 times a week for 4 to 6 weeks. Range of motion and strengthening. TKA protoco     Subjective:  Pt reports 4/10 pain at the start of session. Pt notes increased pain and fatigue over weekend and into treatment session, attributes to increased walking complete over weekend. Pt reports increased fatigue this date.   Pain:  [x] Yes  [] No Location: [facility] Pain Rating: (0-10 scale) 4/10  Pain altered Tx:  [x] No  [] Yes  Action:  Comments:    Objective:  INTERVENTIONS  INTERVENTIONS  Reps/ Time Weight/ Level Completed  Today Comments          MODALITIES        Vasocompression L Knee               MANUAL        Desensitization and scar tissue massage 8 mins      STM L [place] 5 min   Added 5/12          EXERCISES        NuStep 5 min Level 4 x           Standing:       Mini Squats 10 x 2  x 5/1 ed on proper technique   Total Gym Squats 15 x 5\"  x Added 5/12   Hip 3 Way 10x yellow  Added 5/8   Knee Flexion / Extension Step Stretch LLE 10 x 5\" 10 in     Step Ups FWD / Lateral LLE 10 x 2 eac 6 in     Step Downs FWD 15 x 4 in x Added 5/12   Slant Board Stretch 3 x 30\"  x    Marches 20x eac   Added

## 2025-05-22 ENCOUNTER — HOSPITAL ENCOUNTER (OUTPATIENT)
Dept: PHYSICAL THERAPY | Age: 70
Setting detail: THERAPIES SERIES
Discharge: HOME | End: 2025-05-22
Payer: MEDICARE

## 2025-05-22 PROCEDURE — 97110 THERAPEUTIC EXERCISES: CPT

## 2025-05-22 NOTE — FLOWSHEET NOTE
5\" green x Added 5/8  Inc resistance 5/12   Seated:       LAQ                     Mat Level:       Heel Slides w/ Strap L Knee 10 x 5\"  X    Quad Set L Knee 10 x 5\"  HEP Added 5/12   Bridges       Hamstring Stretch 2 X 30\"   standing   Other:      L Knee 4/28 5/5 5/8 5/12 5/15   Flexion 102 AAROM 105 AAROM  100 AAROM 109 AAROM 112   Extension    -2 w/ quad set        Patient Education/Home Program :   Access Code: [email or web address]  URL: [email or web address]  Date: 04/22/2025  Prepared by: Ro Ayala     Exercises  - Seated Hamstring Stretch  - 1 x daily - 3-4 x weekly - 3 sets - 20 - 30 seconds hold  - Seated Knee Flexion AAROM  - 1 x daily - 2-3 x weekly - 1-2 sets - 10 reps - 5 seconds hold  - Supine Heel Slide with Strap  - 1 x daily - 2-3 x weekly - 1-2 sets - 10 reps - 5 seconds hold  - Supine Bridge  - 1 x daily - 2-3 x weekly - 3 sets - 10 reps  - Heel Toe Raises with Counter Support  - 1 x daily - 2-3 x weekly - 1-2 sets - 10 reps  - Ankle Inversion Eversion Towel Slide  - 1 x daily - 2-3 x weekly - 1-2 sets - 10 reps     Added 5/12  - Standing Hip Flexion with Resistance Loop  - 1 x daily - 2-3 x weekly - 3 sets - 10 reps  - Standing Hip Abduction Kicks  - 1 x daily - 2-3 x weekly - 3 sets - 10 reps  - Standing Hip Extension Kicks  - 1 x daily - 2-3 x weekly - 3 sets - 10 reps  - Mini Squat with Counter Support  - 1 x daily - 2-3 x weekly - 3 sets - 10 reps  - Supine Quad Set  - 1 x daily - 2-3 x weekly - 1-2 sets - 10 reps - 5 seconds hold     Pt. Education:  [x] Yes  [] No  [] Reviewed Prior HEP/Ed  Method of Education: [x] Verbal  [x] Demo  [x] Written  Comprehension of Education:  [x] Verbalizes understanding.  [x] Demonstrates understanding.  [] Needs review.  [] Demonstrates/verbalizes HEP/Ed previously given.       Specific Instructions for next treatment   -ROM for flexion and extension L knee  -continue with strengthening   -STM as needed to L shin      Assessment: [x] Progressing toward goals.

## 2025-05-29 ENCOUNTER — HOSPITAL ENCOUNTER (OUTPATIENT)
Dept: PHYSICAL THERAPY | Age: 70
Setting detail: THERAPIES SERIES
Discharge: HOME | End: 2025-05-29
Payer: MEDICARE

## 2025-05-29 PROCEDURE — 97110 THERAPEUTIC EXERCISES: CPT

## 2025-05-29 NOTE — FLOWSHEET NOTE
[facility]patient Physical Therapy   [address]   Phone: [phone]   Fax: [phone]    Physical Therapy Daily Treatment Note    Date:  2025  Patient Name:  Mavis Ann    :  1955  MRN: [id number]  Physician: Nanci Miller PA-C                                  Insurance: [facility] Medicare with visits BMN, AUTH AFTER KAILYN DIASILD  -  FOR 12 VISITS  Medical Diagnosis: Z96.652 (ICD-10-CM) - Status post total left knee replacement           Rehab Codes: M62.81 , M25.562 , M79.662 , M25.662 , R26.89   Onset Date: 2025 DOS                Next Dr.'s appt: 2025  Visit# / total visits: 10/12  (10/12 auth)  Cancels/No Shows: 0/0    Precautions:  L TKA, fall risk, DVT hx LLE ()     Per Referral: Evaluate and treat. 2-3 times a week for 4 to 6 weeks. Range of motion and strengthening. TKA protoco     Subjective:  Pt denies pain at start of session, states \"I really have only had pain after shoveling in my garden\". Pt notes using repetitive knee movements to push shovel into ground.   Pain:  [] Yes  [x] No Location: L [facility] Pain Rating: (0-10 scale) 0/10  Pain altered Tx:  [x] No  [] Yes  Action:  Comments:    Objective:  INTERVENTIONS  INTERVENTIONS  Reps/ Time Weight/ Level Completed  Today Comments          MODALITIES        Vasocompression L Knee               MANUAL        Desensitization and scar tissue massage 8 mins      STM L Hansen 5 min   Added 5/12          EXERCISES        NuStep 5 min Level 4            Standing:       Mini Squats 10 x 2  x 5/1 ed on proper technique   Total Gym Squats 2 x10 5\" hold x Added 5/12   Hip 3 Way 15x red x Added 5/8   Knee Flexion / Extension Step Stretch LLE 10 x 5\" 10 in     Step Ups FWD / Lateral LLE 10 x 2 eac 6 in     Step Downs FWD 2 x10 6 in x Added 5/12   Slant Board Stretch 3 x 30\"  x    Marches 20x eac   Added 5/8   Hamstring curls 15x   Added 5/8   TKE  15x 5\" blue x Added   Inc resistance

## 2025-06-02 ENCOUNTER — HOSPITAL ENCOUNTER (OUTPATIENT)
Dept: PHYSICAL THERAPY | Age: 70
Setting detail: THERAPIES SERIES
Discharge: HOME | End: 2025-06-02
Payer: MEDICARE

## 2025-06-02 PROCEDURE — 97110 THERAPEUTIC EXERCISES: CPT

## 2025-06-02 NOTE — PROGRESS NOTES
-continue with strengthening   -update and review HEP      Assessment: [x] Progressing toward goals.    Patient has attended 11 visits of skilled physical therapy after Left TKA on 4/1/25 with improvement in all aspects of function. She has met all goals as charted above. She has some discomfort along the shin  but it is tolerable which she is able to do all activity. She will benefit from completing last visit of skilled physical therapy visit with PTA and will be discharged afterwards.     [] No change.     [] Other:    [x] Patient would continue to benefit from skilled physical therapy services in order to: decrease pain and improve mobility and strength at the LLE to work towards improving overall functional mobility with working towards PLOF.     GOALS: updated / assessed 5/12/2025  STG: (to be met in 6 treatments)  Pt will self report worst pain no greater than 5-6/10 in order to better tolerate ADLs/work activities with minimal dysfunction GOAL MET 5/12  Pt will improve AROM in L knee flexion to 95 degrees in order to demonstrate ability to move/reach in all planes unrestricted at PLOF GOAL MET 5/12  Pt will improve AROM in L extension to 0 degrees in order to demonstrate ability to move/reach in all planes unrestricted at PLOF MET  LTG: (to be met in 12 treatments)  Pt will demonstrate improved L LE strength to 4+/5 or greater in order to demonstrate improved stability/strength necessary for unrestricted ADLs/work activities MET  Pt will increase from 18 to 21 or greater on Tinetti in order to demonstrate improved functional tolerances at PLOF as well as decreasing fall risk with minimal restriction/dysfunction MET  Pt will increase from 63.7% to 85% on KOOS Jr in order to demonstrate improved functional tolerances at PLOF with minimal restriction/dysfunction MET  Pt will demonstrate independence with a long term HEP for continued progress/maintenance after completion of PT MET  Pt will be able to ambulate

## 2025-06-05 ENCOUNTER — HOSPITAL ENCOUNTER (OUTPATIENT)
Dept: PHYSICAL THERAPY | Age: 70
Setting detail: THERAPIES SERIES
Discharge: HOME | End: 2025-06-05
Payer: MEDICARE

## 2025-06-05 PROCEDURE — 97110 THERAPEUTIC EXERCISES: CPT

## 2025-06-05 NOTE — PROGRESS NOTES
lunges 10x  x Added 5/29   Seated:       LAQ                     Mat Level:       Heel Slides w/ Strap L Knee 10 x 5\"      Quad Set L Knee 10 x 5\"  HEP Added 5/12   Bridges 2x10  x    Hamstring Stretch 2 X 30\"   standing   Other:      L Knee 4/28 5/5 5/8 5/12 5/15   Flexion 102 AAROM 105 AAROM  100 AAROM 109 AAROM 112   Extension    -2 w/ quad set          Patient Education/Home Program :   Access Code: [email or web address]  URL: [email or web address]  Date: 04/22/2025  Prepared by: Ro Ayala     Exercises  - Seated Hamstring Stretch  - 1 x daily - 3-4 x weekly - 3 sets - 20 - 30 seconds hold  - Seated Knee Flexion AAROM  - 1 x daily - 2-3 x weekly - 1-2 sets - 10 reps - 5 seconds hold  - Supine Heel Slide with Strap  - 1 x daily - 2-3 x weekly - 1-2 sets - 10 reps - 5 seconds hold  - Supine Bridge  - 1 x daily - 2-3 x weekly - 3 sets - 10 reps  - Heel Toe Raises with Counter Support  - 1 x daily - 2-3 x weekly - 1-2 sets - 10 reps  - Ankle Inversion Eversion Towel Slide  - 1 x daily - 2-3 x weekly - 1-2 sets - 10 reps     Added 5/12  - Standing Hip Flexion with Resistance Loop  - 1 x daily - 2-3 x weekly - 3 sets - 10 reps  - Standing Hip Abduction Kicks  - 1 x daily - 2-3 x weekly - 3 sets - 10 reps  - Standing Hip Extension Kicks  - 1 x daily - 2-3 x weekly - 3 sets - 10 reps  - Mini Squat with Counter Support  - 1 x daily - 2-3 x weekly - 3 sets - 10 reps  - Supine Quad Set  - 1 x daily - 2-3 x weekly - 1-2 sets - 10 reps - 5 seconds hold     Pt. Education:  [x] Yes  [] No  [] Reviewed Prior HEP/Ed  Method of Education: [x] Verbal  [x] Demo  [x] Written  Comprehension of Education:  [x] Verbalizes understanding.  [x] Demonstrates understanding.  [] Needs review.  [x] Demonstrates/verbalizes HEP/Ed previously given.       Specific Instructions for next treatment   -continue with strengthening   -update and review HEP      Assessment: [x] Progressing toward goals.    Pt completed exercises as charted above.

## 2025-07-08 ENCOUNTER — HOSPITAL ENCOUNTER (OUTPATIENT)
Dept: CT IMAGING | Age: 70
Discharge: HOME | End: 2025-07-10
Payer: MEDICARE

## 2025-07-08 VITALS — WEIGHT: 160 LBS | BODY MASS INDEX: 31.41 KG/M2 | HEIGHT: 60 IN

## 2025-07-08 DIAGNOSIS — Z87.891 PERSONAL HISTORY OF TOBACCO USE: ICD-10-CM

## 2025-07-08 PROCEDURE — 71271 CT THORAX LUNG CANCER SCR C-: CPT

## (undated) DEVICE — TOWEL,OR,DSP,ST,BLUE,DLX,XR,4/PK,20PK/CS: Brand: MEDLINE

## (undated) DEVICE — GLOVE SURG SZ 75 L12IN FNGR THK79MIL GRN LTX FREE

## (undated) DEVICE — Device

## (undated) DEVICE — DRAPE,EXTREMITY,89X128,STERILE: Brand: MEDLINE

## (undated) DEVICE — TUBING, SUCTION, 9/32" X 20', STRAIGHT: Brand: MEDLINE INDUSTRIES, INC.

## (undated) DEVICE — COVER,MAYO STAND,XL,STERILE: Brand: MEDLINE

## (undated) DEVICE — DRAPE,REIN 53X77,STERILE: Brand: MEDLINE

## (undated) DEVICE — BANDAGE COBAN 4 IN COMPR W4INXL5YD FOAM COHESIVE QUIK STK SELF ADH SFT

## (undated) DEVICE — MERCY HEALTH ST CHARLES: Brand: MEDLINE INDUSTRIES, INC.

## (undated) DEVICE — 3M™ IOBAN™ 2 ANTIMICROBIAL INCISE DRAPE 6651EZ: Brand: IOBAN™ 2

## (undated) DEVICE — SOLUTION IV IRRIG POUR BRL 0.9% SODIUM CHL 2F7124

## (undated) DEVICE — CONTAINER,SPECIMEN,OR STERILE,4OZ: Brand: MEDLINE

## (undated) DEVICE — SOLUTION IRRIG 3000ML 0.9% SOD CHL USP UROMATIC PLAS CONT

## (undated) DEVICE — GOWN,SIRUS,POLYRNF,BRTHSLV,LG,30/CS: Brand: MEDLINE

## (undated) DEVICE — GOWN,SIRUS,POLYRNF,BRTHSLV,XL,30/CS: Brand: MEDLINE

## (undated) DEVICE — PIN HOLDING HDLSS 3.2X75 MM TROCAR ZUK

## (undated) DEVICE — DRESSING PETRO W3XL8IN OIL EMUL N ADH GZ KNIT IMPREG CELOS

## (undated) DEVICE — BIPOLAR SEALER 23-112-1 AQM 6.0: Brand: AQUAMANTYS ®

## (undated) DEVICE — STOCKINETTE,IMPERVIOUS,12X48,STERILE: Brand: MEDLINE

## (undated) DEVICE — SWAB CULT CLR BLU PLAS RAYON LIQ AMIES AERB ANAERB FRIC CAP

## (undated) DEVICE — SUTURE PROL SZ 3-0 L18IN NONABSORBABLE BLU L30MM FS-1 3/8 8663G

## (undated) DEVICE — BLADE SAW L510MM S STL GIGLI FOR BNE CUT

## (undated) DEVICE — GOWN,AURORA,NON-REINFORCED,2XL: Brand: MEDLINE

## (undated) DEVICE — 3M™ WARMING BLANKET, LOWER BODY, 10 PER CASE, 42568: Brand: BAIR HUGGER™

## (undated) DEVICE — POUCH INSTR W6.75XL11.5IN FRST 2 PKT ADH FOR ORTH AND

## (undated) DEVICE — ELECTRODE PT RET AD L9FT HI MOIST COND ADH HYDRGEL CORDED

## (undated) DEVICE — 4-PORT MANIFOLD: Brand: NEPTUNE 2

## (undated) DEVICE — STRIP,CLOSURE,WOUND,MEDI-STRIP,1/2X4: Brand: MEDLINE

## (undated) DEVICE — BLANKET WRM W29.9XL79.1IN UP BODY FORC AIR MISTRAL-AIR

## (undated) DEVICE — GLOVE SURG SZ 8 CRM LTX FREE POLYISOPRENE POLYMER BEAD ANTI

## (undated) DEVICE — SUTURE STRATAFIX SYMMETRIC PDS + SZ 1 L18IN ABSRB VLT L48MM SXPP1A400

## (undated) DEVICE — 1010 S-DRAPE TOWEL DRAPE 10/BX: Brand: STERI-DRAPE™

## (undated) DEVICE — SET HNDPC W COAX BNE CLN TIP SUCT TB BTTRY PWR DISPOSABLE

## (undated) DEVICE — SOLUTION IRRIG 1000ML STRL H2O USP PLAS POUR BTL

## (undated) DEVICE — YANKAUER,OPEN TIP,W/O VENT,STERILE: Brand: MEDLINE INDUSTRIES, INC.

## (undated) DEVICE — OSCILLATING TIP SAW CARTRIDGE: Brand: PRECISION FALCON

## (undated) DEVICE — 4.0MM ROUND SOLID CARBIDE BUR MEDIUM

## (undated) DEVICE — SUTURE FIBERWIRE SZ 2 L38IN NONABSORBABLE BLU L36.6MM 1/2 AR7202

## (undated) DEVICE — 450 ML BOTTLE OF 0.05% CHLORHEXIDINE GLUCONATE IN 99.95% STERILE WATER FOR IRRIGATION, USP AND APPLICATOR.: Brand: IRRISEPT ANTIMICROBIAL WOUND LAVAGE

## (undated) DEVICE — GLOVE SURG SZ 65 CRM LTX FREE POLYISOPRENE POLYMER BEAD ANTI

## (undated) DEVICE — DUAL CUT SAGITTAL BLADE

## (undated) DEVICE — 3M™ STERI-DRAPE™ U-DRAPE 1015: Brand: STERI-DRAPE™

## (undated) DEVICE — GLOVE ORANGE PI 7   MSG9070

## (undated) DEVICE — TOWEL,OR,DSP,ST,BLUE,STD,6/PK,12PK/CS: Brand: MEDLINE

## (undated) DEVICE — DRAPE,U/ SHT,SPLIT,PLAS,STERIL: Brand: MEDLINE

## (undated) DEVICE — COVER,TABLE,HEAVY DUTY,50"X90",STRL: Brand: MEDLINE

## (undated) DEVICE — SOLUTION IRRIG 1000ML 0.9% SOD CHL USP POUR PLAS BTL

## (undated) DEVICE — CLOSURE SKIN FLX NONINVASIVE PRELOC TECHNOLOGY FOR 24IN

## (undated) DEVICE — CEMENT MIXING SYSTEM WITH FEMORAL BREAKWAY NOZZLE: Brand: REVOLUTION

## (undated) DEVICE — SYRINGE,TOOMEY,IRRIGATION,70CC,STERILE: Brand: MEDLINE

## (undated) DEVICE — TUBING SUCT 12FR MAL ALUM SHFT FN CAP VENT UNIV CONN W/ OBT

## (undated) DEVICE — INTENDED TO SUPPORT AND MAINTAIN THE POSITION OF AN ANESTHETIZED PATIENT DURING SURGERY: Brand: HERMANTOR XL PINK KNEE POSITIONING PAD

## (undated) DEVICE — PRECISION THIN (9.0 X 0.38 X 25.0MM)

## (undated) DEVICE — OPTIFOAM GENTLE AG,POST-OP STRIP,3.5X14: Brand: MEDLINE

## (undated) DEVICE — GLOVE SURG SZ 65 L12IN FNGR THK79MIL GRN LTX FREE

## (undated) DEVICE — SUTURE STRATAFIX SPRL MCRYL + SZ 2 0 L27IN ABSRB UD W NDL SXMP1B419

## (undated) DEVICE — MARKER,SKIN,WI/RULER AND LABELS: Brand: MEDLINE

## (undated) DEVICE — MASTISOL ADHESIVE LIQ 2/3ML

## (undated) DEVICE — KIT SEP W/ BLD DRAW TB SYR NDL TRNQT PD

## (undated) DEVICE — GLOVE ORTHO 8   MSG9480

## (undated) DEVICE — KIT POS ULT SHLDR PT CARE REHAB SLNG

## (undated) DEVICE — SOLUTION IV IRRIG WATER 1000ML POUR BRL 2F7114

## (undated) DEVICE — BRUSH TOOTH SURG SCRB FOR CANN CLN

## (undated) DEVICE — PENCIL ES L3M BTTN SWCH HOLSTER W/ BLDE ELECTRD EDGE

## (undated) DEVICE — SUTURE STRATAFIX SPRL SZ 3-0 L5IN ABSRB UD FS L26MM 3/8 CIR SXMP2B411

## (undated) DEVICE — SYRINGE MED 30ML STD CLR PLAS LUERLOCK TIP N CTRL DISP

## (undated) DEVICE — SUTURE ETHBND EXCEL SZ 1 L30IN NONABSORBABLE GRN L36MM CT-1 X425H

## (undated) DEVICE — KIT AUTOTRNS APPL AERO 2 SET SYR 2 TIP FOR PLT SEP SYS GPS

## (undated) DEVICE — INTENDED FOR TISSUE SEPARATION, AND OTHER PROCEDURES THAT REQUIRE A SHARP SURGICAL BLADE TO PUNCTURE OR CUT.: Brand: BARD-PARKER ® CARBON RIB-BACK BLADES

## (undated) DEVICE — INTENT TO BE USED WITH SUTURE MATERIAL FOR TISSUE CLOSURE: Brand: RICHARD-ALLAN® NEEDLE 1/2 CIRCLE TAPER

## (undated) DEVICE — GLOVE SURG SZ 75 CRM LTX FREE POLYISOPRENE POLYMER BEAD ANTI

## (undated) DEVICE — 3M™ IOBAN™ 2 ANTIMICROBIAL INCISE DRAPE 6650EZ: Brand: IOBAN™ 2

## (undated) DEVICE — SUTURE VCRL + SZ 3-0 L36IN ABSRB UD L36MM CT-1 1/2 CIR VCP944H

## (undated) DEVICE — GLOVE SURG SZ 85 L12IN FNGR THK79MIL GRN LTX FREE

## (undated) DEVICE — DRESSING TRNSPAR W5XL4.5IN FLM SHT SEMIPERMEABLE WIND

## (undated) DEVICE — BIT DRL L110MM DIA2.5MM G QUIK CPL W/O STP REUSE

## (undated) DEVICE — COLLECTOR SPEC RAYON LIQ STUART DBL FOR THRT VAG SKIN WND

## (undated) DEVICE — HYPODERMIC SAFETY NEEDLE: Brand: MAGELLAN

## (undated) DEVICE — SUTURE VICRYL SZ 0 L36IN ABSRB UD L36MM CT-1 1/2 CIR J946H

## (undated) DEVICE — GLOVE SURG SZ 85 CRM LTX FREE POLYISOPRENE POLYMER BEAD ANTI

## (undated) DEVICE — NEPTUNE E-SEP SMOKE EVACUATION PENCIL, COATED, 70MM BLADE, PUSH BUTTON SWITCH: Brand: NEPTUNE E-SEP

## (undated) DEVICE — BANDAGE,SELF ADHRNT,COFLEX,4"X5YD,STRL: Brand: COLABEL

## (undated) DEVICE — SUTURE VCRL SZ 0 L36IN ABSRB UD CT-1 L36MM 1/2 CIR TAPR PNT VCP946H

## (undated) DEVICE — SUTURE VICRYL SZ 1 L36IN ABSRB UD L36MM CT-1 1/2 CIR J947H

## (undated) DEVICE — GLOVE ORANGE PI 7 1/2   MSG9075

## (undated) DEVICE — COOLER THER 20-31IN L CRYO COMB W/ PD KNEE TB GRAV FLO

## (undated) DEVICE — SHEET, ORTHO, SPLIT, STERILE: Brand: MEDLINE

## (undated) DEVICE — SPONGE LAP W18XL18IN WHT COT 4 PLY FLD STRUNG RADPQ DISP ST

## (undated) DEVICE — CONTAINER,SPEC,PNEUM TUBE,3OZ,STRL PATH: Brand: MEDLINE

## (undated) DEVICE — RECIPROCATING BLADE, DOUBLE SIDED, OFFSET  (70.0 X 0.8 X 12.5MM)

## (undated) DEVICE — Z INACTIVE USE 2660664 SOLUTION IRRIG 3000ML 0.9% SOD CHL USP UROMATIC PLAS CONT

## (undated) DEVICE — SUTURE ABSORBABLE MONOFILAMENT 2-0 CT-1 24 CM 36 MM VIO PDS+

## (undated) DEVICE — STERILE PATIENT PROTECTIVE PAD FOR IMP® KNEE POSITIONERS & COHESIVE WRAP (10 / CASE): Brand: DE MAYO KNEE POSITIONER®

## (undated) DEVICE — GLOVE ORANGE PI 8 1/2   MSG9085

## (undated) DEVICE — DRESSING ALGINATE POST OPERATIVE 12X3.5 IN RECT PRIMASEAL

## (undated) DEVICE — SOL IRR SOD CHL 0.9% TITAN XL CNTNR 3000ML

## (undated) DEVICE — SUTURE ABSORBABLE MONOFILAMENT 1 CTX 36 CM 48 MM VIO PDS +